# Patient Record
Sex: MALE | Race: BLACK OR AFRICAN AMERICAN | NOT HISPANIC OR LATINO | Employment: FULL TIME | URBAN - METROPOLITAN AREA
[De-identification: names, ages, dates, MRNs, and addresses within clinical notes are randomized per-mention and may not be internally consistent; named-entity substitution may affect disease eponyms.]

---

## 2017-01-01 ENCOUNTER — HOSPITAL ENCOUNTER (INPATIENT)
Facility: HOSPITAL | Age: 52
LOS: 1 days | Discharge: HOME | DRG: 194 | End: 2017-03-08
Attending: EMERGENCY MEDICINE | Admitting: HOSPITALIST

## 2017-01-01 ENCOUNTER — PATIENT OUTREACH (OUTPATIENT)
Dept: ADMINISTRATIVE | Facility: CLINIC | Age: 52
End: 2017-01-01

## 2017-01-01 ENCOUNTER — SURGERY (OUTPATIENT)
Age: 52
End: 2017-01-01

## 2017-01-01 ENCOUNTER — ANESTHESIA EVENT (OUTPATIENT)
Dept: CARDIOLOGY | Facility: HOSPITAL | Age: 52
DRG: 270 | End: 2017-01-01
Payer: MEDICAID

## 2017-01-01 ENCOUNTER — ANESTHESIA (OUTPATIENT)
Dept: CARDIOLOGY | Facility: HOSPITAL | Age: 52
DRG: 270 | End: 2017-01-01
Payer: MEDICAID

## 2017-01-01 ENCOUNTER — HOSPITAL ENCOUNTER (INPATIENT)
Facility: HOSPITAL | Age: 52
LOS: 6 days | DRG: 270 | End: 2017-12-20
Attending: EMERGENCY MEDICINE | Admitting: INTERNAL MEDICINE
Payer: MEDICAID

## 2017-01-01 VITALS
OXYGEN SATURATION: 98 % | TEMPERATURE: 99 F | DIASTOLIC BLOOD PRESSURE: 85 MMHG | HEIGHT: 75 IN | BODY MASS INDEX: 31.29 KG/M2 | RESPIRATION RATE: 22 BRPM | WEIGHT: 251.69 LBS | HEART RATE: 74 BPM | SYSTOLIC BLOOD PRESSURE: 145 MMHG

## 2017-01-01 DIAGNOSIS — E87.8 ELECTROLYTE ABNORMALITY: ICD-10-CM

## 2017-01-01 DIAGNOSIS — I51.7 ENLARGED LA (LEFT ATRIUM): ICD-10-CM

## 2017-01-01 DIAGNOSIS — N17.1 ACUTE RENAL FAILURE WITH ACUTE CORTICAL NECROSIS: ICD-10-CM

## 2017-01-01 DIAGNOSIS — I63.9 CEREBROVASCULAR ACCIDENT (CVA), UNSPECIFIED MECHANISM: ICD-10-CM

## 2017-01-01 DIAGNOSIS — E11.9 CONTROLLED TYPE 2 DIABETES MELLITUS WITHOUT COMPLICATION, WITHOUT LONG-TERM CURRENT USE OF INSULIN: Chronic | ICD-10-CM

## 2017-01-01 DIAGNOSIS — I50.42 CHRONIC COMBINED SYSTOLIC AND DIASTOLIC HEART FAILURE: Chronic | ICD-10-CM

## 2017-01-01 DIAGNOSIS — Z86.718 HISTORY OF DVT (DEEP VEIN THROMBOSIS): Chronic | ICD-10-CM

## 2017-01-01 DIAGNOSIS — Z86.711 HISTORY OF PULMONARY EMBOLISM: Chronic | ICD-10-CM

## 2017-01-01 DIAGNOSIS — R57.0 CARDIOGENIC SHOCK: ICD-10-CM

## 2017-01-01 DIAGNOSIS — R79.89 ELEVATED TROPONIN: Chronic | ICD-10-CM

## 2017-01-01 DIAGNOSIS — R79.89 ELEVATED LFTS: ICD-10-CM

## 2017-01-01 DIAGNOSIS — I48.0 PAROXYSMAL ATRIAL FIBRILLATION: ICD-10-CM

## 2017-01-01 DIAGNOSIS — I48.92 ATRIAL FLUTTER: ICD-10-CM

## 2017-01-01 DIAGNOSIS — R00.0 TACHYCARDIA: Primary | ICD-10-CM

## 2017-01-01 DIAGNOSIS — G93.6 CYTOTOXIC CEREBRAL EDEMA: ICD-10-CM

## 2017-01-01 DIAGNOSIS — J18.9 PNEUMONIA OF RIGHT LOWER LOBE DUE TO INFECTIOUS ORGANISM: Primary | ICD-10-CM

## 2017-01-01 DIAGNOSIS — I50.43 ACUTE ON CHRONIC COMBINED SYSTOLIC AND DIASTOLIC HEART FAILURE: ICD-10-CM

## 2017-01-01 DIAGNOSIS — Z91.148 HISTORY OF MEDICATION NONCOMPLIANCE: Chronic | ICD-10-CM

## 2017-01-01 DIAGNOSIS — I10 ESSENTIAL HYPERTENSION: Chronic | ICD-10-CM

## 2017-01-01 DIAGNOSIS — I50.21 ACUTE SYSTOLIC HEART FAILURE: ICD-10-CM

## 2017-01-01 DIAGNOSIS — N17.8 ACUTE RENAL FAILURE WITH OTHER SPECIFIED PATHOLOGICAL LESION IN KIDNEY: ICD-10-CM

## 2017-01-01 DIAGNOSIS — R07.9 CHEST PAIN: ICD-10-CM

## 2017-01-01 DIAGNOSIS — I48.19 PERSISTENT ATRIAL FIBRILLATION: ICD-10-CM

## 2017-01-01 DIAGNOSIS — I63.511 ACUTE RIGHT MCA STROKE: ICD-10-CM

## 2017-01-01 DIAGNOSIS — F10.20 ALCOHOLISM: ICD-10-CM

## 2017-01-01 DIAGNOSIS — I63.411 EMBOLIC STROKE INVOLVING RIGHT MIDDLE CEREBRAL ARTERY: ICD-10-CM

## 2017-01-01 DIAGNOSIS — Z72.0 TOBACCO ABUSE: Chronic | ICD-10-CM

## 2017-01-01 DIAGNOSIS — I26.99 OTHER ACUTE PULMONARY EMBOLISM WITHOUT ACUTE COR PULMONALE: ICD-10-CM

## 2017-01-01 DIAGNOSIS — E86.1 HYPOVOLEMIA: ICD-10-CM

## 2017-01-01 DIAGNOSIS — R06.02 SHORTNESS OF BREATH: ICD-10-CM

## 2017-01-01 DIAGNOSIS — G81.94 LEFT HEMIPARESIS: ICD-10-CM

## 2017-01-01 LAB
ABO + RH BLD: NORMAL
ALBUMIN SERPL BCP-MCNC: 1.9 G/DL
ALBUMIN SERPL BCP-MCNC: 1.9 G/DL
ALBUMIN SERPL BCP-MCNC: 2 G/DL
ALBUMIN SERPL BCP-MCNC: 2.1 G/DL
ALBUMIN SERPL BCP-MCNC: 2.1 G/DL
ALBUMIN SERPL BCP-MCNC: 2.2 G/DL
ALBUMIN SERPL BCP-MCNC: 2.5 G/DL
ALBUMIN SERPL BCP-MCNC: 2.5 G/DL
ALBUMIN SERPL BCP-MCNC: 2.7 G/DL
ALBUMIN SERPL BCP-MCNC: 2.8 G/DL
ALBUMIN SERPL BCP-MCNC: 3.1 G/DL
ALBUMIN SERPL BCP-MCNC: 3.9 G/DL
ALLENS TEST: ABNORMAL
ALP SERPL-CCNC: 137 U/L
ALP SERPL-CCNC: 181 U/L
ALP SERPL-CCNC: 192 U/L
ALP SERPL-CCNC: 208 U/L
ALP SERPL-CCNC: 216 U/L
ALP SERPL-CCNC: 230 U/L
ALP SERPL-CCNC: 233 U/L
ALP SERPL-CCNC: 235 U/L
ALP SERPL-CCNC: 53 U/L
ALP SERPL-CCNC: 65 U/L
ALT SERPL W/O P-5'-P-CCNC: 1099 U/L
ALT SERPL W/O P-5'-P-CCNC: 1380 U/L
ALT SERPL W/O P-5'-P-CCNC: 15 U/L
ALT SERPL W/O P-5'-P-CCNC: 1544 U/L
ALT SERPL W/O P-5'-P-CCNC: 2188 U/L
ALT SERPL W/O P-5'-P-CCNC: 22 U/L
ALT SERPL W/O P-5'-P-CCNC: 3493 U/L
ALT SERPL W/O P-5'-P-CCNC: 4242 U/L
ALT SERPL W/O P-5'-P-CCNC: 5648 U/L
ALT SERPL W/O P-5'-P-CCNC: 98 U/L
ANION GAP SERPL CALC-SCNC: 10 MMOL/L
ANION GAP SERPL CALC-SCNC: 13 MMOL/L
ANION GAP SERPL CALC-SCNC: 13 MMOL/L
ANION GAP SERPL CALC-SCNC: 15 MMOL/L
ANION GAP SERPL CALC-SCNC: 16 MMOL/L
ANION GAP SERPL CALC-SCNC: 16 MMOL/L
ANION GAP SERPL CALC-SCNC: 17 MMOL/L
ANION GAP SERPL CALC-SCNC: 17 MMOL/L
ANION GAP SERPL CALC-SCNC: 21 MMOL/L
ANION GAP SERPL CALC-SCNC: 25 MMOL/L
ANION GAP SERPL CALC-SCNC: 4 MMOL/L
ANION GAP SERPL CALC-SCNC: 5 MMOL/L
ANION GAP SERPL CALC-SCNC: 5 MMOL/L
ANION GAP SERPL CALC-SCNC: 6 MMOL/L
ANION GAP SERPL CALC-SCNC: 7 MMOL/L
ANION GAP SERPL CALC-SCNC: 9 MMOL/L
ANISOCYTOSIS BLD QL SMEAR: SLIGHT
APTT BLDCRRT: 26.8 SEC
APTT BLDCRRT: 35.5 SEC
APTT BLDCRRT: 36.1 SEC
AST SERPL-CCNC: 1093 U/L
AST SERPL-CCNC: 1458 U/L
AST SERPL-CCNC: 16 U/L
AST SERPL-CCNC: 2945 U/L
AST SERPL-CCNC: 32 U/L
AST SERPL-CCNC: 689 U/L
AST SERPL-CCNC: 7771 U/L
AST SERPL-CCNC: 93 U/L
AST SERPL-CCNC: ABNORMAL U/L
AST SERPL-CCNC: ABNORMAL U/L
BACTERIA #/AREA URNS AUTO: ABNORMAL /HPF
BACTERIA BLD CULT: NORMAL
BACTERIA SPEC AEROBE CULT: NORMAL
BACTERIA UR CULT: NO GROWTH
BASOPHILS # BLD AUTO: 0.01 K/UL
BASOPHILS # BLD AUTO: 0.02 K/UL
BASOPHILS # BLD AUTO: 0.03 K/UL
BASOPHILS # BLD AUTO: 0.03 K/UL
BASOPHILS # BLD AUTO: 0.05 K/UL
BASOPHILS NFR BLD: 0.1 %
BASOPHILS NFR BLD: 0.2 %
BASOPHILS NFR BLD: 0.3 %
BILIRUB SERPL-MCNC: 0.6 MG/DL
BILIRUB SERPL-MCNC: 0.8 MG/DL
BILIRUB SERPL-MCNC: 0.9 MG/DL
BILIRUB SERPL-MCNC: 2.9 MG/DL
BILIRUB SERPL-MCNC: 4.1 MG/DL
BILIRUB SERPL-MCNC: 4.5 MG/DL
BILIRUB SERPL-MCNC: 5.7 MG/DL
BILIRUB SERPL-MCNC: 8.3 MG/DL
BILIRUB SERPL-MCNC: 8.6 MG/DL
BILIRUB SERPL-MCNC: 8.6 MG/DL
BILIRUB UR QL STRIP: NEGATIVE
BLD GP AB SCN CELLS X3 SERPL QL: NORMAL
BLD PROD TYP BPU: NORMAL
BLD PROD TYP BPU: NORMAL
BLOOD UNIT EXPIRATION DATE: NORMAL
BLOOD UNIT EXPIRATION DATE: NORMAL
BLOOD UNIT TYPE CODE: 6200
BLOOD UNIT TYPE CODE: 6200
BLOOD UNIT TYPE: NORMAL
BLOOD UNIT TYPE: NORMAL
BNP SERPL-MCNC: 187 PG/ML
BNP SERPL-MCNC: 671 PG/ML
BNP SERPL-MCNC: 679 PG/ML
BUN SERPL-MCNC: 10 MG/DL
BUN SERPL-MCNC: 13 MG/DL
BUN SERPL-MCNC: 13 MG/DL
BUN SERPL-MCNC: 15 MG/DL
BUN SERPL-MCNC: 21 MG/DL
BUN SERPL-MCNC: 25 MG/DL
BUN SERPL-MCNC: 25 MG/DL
BUN SERPL-MCNC: 26 MG/DL
BUN SERPL-MCNC: 31 MG/DL
BUN SERPL-MCNC: 36 MG/DL
BUN SERPL-MCNC: 46 MG/DL
BUN SERPL-MCNC: 47 MG/DL
BUN SERPL-MCNC: 50 MG/DL
BUN SERPL-MCNC: 7 MG/DL
BUN SERPL-MCNC: 7 MG/DL
BUN SERPL-MCNC: 8 MG/DL
BUN SERPL-MCNC: 9 MG/DL
BURR CELLS BLD QL SMEAR: ABNORMAL
CA-I BLDV-SCNC: 0.92 MMOL/L
CALCIUM SERPL-MCNC: 5.7 MG/DL
CALCIUM SERPL-MCNC: 5.7 MG/DL
CALCIUM SERPL-MCNC: 5.8 MG/DL
CALCIUM SERPL-MCNC: 5.8 MG/DL
CALCIUM SERPL-MCNC: 6.3 MG/DL
CALCIUM SERPL-MCNC: 6.4 MG/DL
CALCIUM SERPL-MCNC: 6.6 MG/DL
CALCIUM SERPL-MCNC: 6.7 MG/DL
CALCIUM SERPL-MCNC: 6.8 MG/DL
CALCIUM SERPL-MCNC: 6.8 MG/DL
CALCIUM SERPL-MCNC: 8.2 MG/DL
CALCIUM SERPL-MCNC: 8.2 MG/DL
CALCIUM SERPL-MCNC: 8.5 MG/DL
CALCIUM SERPL-MCNC: 8.6 MG/DL
CALCIUM SERPL-MCNC: 8.6 MG/DL
CALCIUM SERPL-MCNC: 8.7 MG/DL
CALCIUM SERPL-MCNC: 9 MG/DL
CALCIUM SERPL-MCNC: 9 MG/DL
CALCIUM SERPL-MCNC: 9.1 MG/DL
CALCIUM SERPL-MCNC: 9.2 MG/DL
CALCIUM SERPL-MCNC: 9.3 MG/DL
CHLORIDE SERPL-SCNC: 100 MMOL/L
CHLORIDE SERPL-SCNC: 101 MMOL/L
CHLORIDE SERPL-SCNC: 101 MMOL/L
CHLORIDE SERPL-SCNC: 102 MMOL/L
CHLORIDE SERPL-SCNC: 103 MMOL/L
CHLORIDE SERPL-SCNC: 104 MMOL/L
CHLORIDE SERPL-SCNC: 88 MMOL/L
CHLORIDE SERPL-SCNC: 89 MMOL/L
CHLORIDE SERPL-SCNC: 89 MMOL/L
CHLORIDE SERPL-SCNC: 90 MMOL/L
CHLORIDE SERPL-SCNC: 91 MMOL/L
CHLORIDE SERPL-SCNC: 91 MMOL/L
CHLORIDE SERPL-SCNC: 92 MMOL/L
CHLORIDE SERPL-SCNC: 93 MMOL/L
CHLORIDE SERPL-SCNC: 95 MMOL/L
CHLORIDE SERPL-SCNC: 95 MMOL/L
CHLORIDE SERPL-SCNC: 96 MMOL/L
CHLORIDE SERPL-SCNC: 97 MMOL/L
CLARITY UR REFRACT.AUTO: ABNORMAL
CO2 SERPL-SCNC: 13 MMOL/L
CO2 SERPL-SCNC: 17 MMOL/L
CO2 SERPL-SCNC: 18 MMOL/L
CO2 SERPL-SCNC: 19 MMOL/L
CO2 SERPL-SCNC: 19 MMOL/L
CO2 SERPL-SCNC: 22 MMOL/L
CO2 SERPL-SCNC: 22 MMOL/L
CO2 SERPL-SCNC: 23 MMOL/L
CO2 SERPL-SCNC: 24 MMOL/L
CO2 SERPL-SCNC: 25 MMOL/L
CO2 SERPL-SCNC: 26 MMOL/L
CO2 SERPL-SCNC: 27 MMOL/L
CO2 SERPL-SCNC: 28 MMOL/L
CO2 SERPL-SCNC: 28 MMOL/L
CO2 SERPL-SCNC: 29 MMOL/L
CODING SYSTEM: NORMAL
CODING SYSTEM: NORMAL
COLOR UR AUTO: ABNORMAL
CREAT SERPL-MCNC: 0.9 MG/DL
CREAT SERPL-MCNC: 1.2 MG/DL
CREAT SERPL-MCNC: 1.3 MG/DL
CREAT SERPL-MCNC: 1.4 MG/DL
CREAT SERPL-MCNC: 1.5 MG/DL
CREAT SERPL-MCNC: 1.7 MG/DL
CREAT SERPL-MCNC: 1.7 MG/DL
CREAT SERPL-MCNC: 2 MG/DL
CREAT SERPL-MCNC: 2.3 MG/DL
CREAT SERPL-MCNC: 2.8 MG/DL
CREAT SERPL-MCNC: 3.6 MG/DL
CREAT SERPL-MCNC: 3.6 MG/DL
CREAT SERPL-MCNC: 4.3 MG/DL
CREAT SERPL-MCNC: 4.5 MG/DL
CREAT SERPL-MCNC: 5.7 MG/DL
CREAT SERPL-MCNC: 5.7 MG/DL
CREAT SERPL-MCNC: 5.9 MG/DL
CREAT SERPL-MCNC: 5.9 MG/DL
CREAT SERPL-MCNC: 6 MG/DL
D DIMER PPP IA.FEU-MCNC: 2.7 MG/L FEU
DACRYOCYTES BLD QL SMEAR: ABNORMAL
DELSYS: ABNORMAL
DIASTOLIC DYSFUNCTION: YES
DIFFERENTIAL METHOD: ABNORMAL
DISPENSE STATUS: NORMAL
DISPENSE STATUS: NORMAL
EOSINOPHIL # BLD AUTO: 0 K/UL
EOSINOPHIL # BLD AUTO: 0.1 K/UL
EOSINOPHIL NFR BLD: 0 %
EOSINOPHIL NFR BLD: 0 %
EOSINOPHIL NFR BLD: 0.1 %
EOSINOPHIL NFR BLD: 0.2 %
EOSINOPHIL NFR BLD: 0.3 %
EOSINOPHIL NFR BLD: 0.3 %
EOSINOPHIL NFR BLD: 0.4 %
EOSINOPHIL NFR BLD: 0.5 %
EOSINOPHIL NFR BLD: 0.9 %
EOSINOPHIL NFR BLD: 1.7 %
ERYTHROCYTE [DISTWIDTH] IN BLOOD BY AUTOMATED COUNT: 13.6 %
ERYTHROCYTE [DISTWIDTH] IN BLOOD BY AUTOMATED COUNT: 13.8 %
ERYTHROCYTE [DISTWIDTH] IN BLOOD BY AUTOMATED COUNT: 13.9 %
ERYTHROCYTE [DISTWIDTH] IN BLOOD BY AUTOMATED COUNT: 14 %
ERYTHROCYTE [DISTWIDTH] IN BLOOD BY AUTOMATED COUNT: 14.1 %
ERYTHROCYTE [DISTWIDTH] IN BLOOD BY AUTOMATED COUNT: 14.3 %
ERYTHROCYTE [DISTWIDTH] IN BLOOD BY AUTOMATED COUNT: 14.4 %
EST. GFR  (AFRICAN AMERICAN): 11.6 ML/MIN/1.73 M^2
EST. GFR  (AFRICAN AMERICAN): 11.8 ML/MIN/1.73 M^2
EST. GFR  (AFRICAN AMERICAN): 11.8 ML/MIN/1.73 M^2
EST. GFR  (AFRICAN AMERICAN): 12.3 ML/MIN/1.73 M^2
EST. GFR  (AFRICAN AMERICAN): 12.3 ML/MIN/1.73 M^2
EST. GFR  (AFRICAN AMERICAN): 16 ML/MIN/1.73 M^2
EST. GFR  (AFRICAN AMERICAN): 17.3 ML/MIN/1.73 M^2
EST. GFR  (AFRICAN AMERICAN): 21.5 ML/MIN/1.73 M^2
EST. GFR  (AFRICAN AMERICAN): 21.5 ML/MIN/1.73 M^2
EST. GFR  (AFRICAN AMERICAN): 29 ML/MIN/1.73 M^2
EST. GFR  (AFRICAN AMERICAN): 36.9 ML/MIN/1.73 M^2
EST. GFR  (AFRICAN AMERICAN): 43.7 ML/MIN/1.73 M^2
EST. GFR  (AFRICAN AMERICAN): 53.2 ML/MIN/1.73 M^2
EST. GFR  (AFRICAN AMERICAN): 53.2 ML/MIN/1.73 M^2
EST. GFR  (AFRICAN AMERICAN): >60 ML/MIN/1.73 M^2
EST. GFR  (NON AFRICAN AMERICAN): 10 ML/MIN/1.73 M^2
EST. GFR  (NON AFRICAN AMERICAN): 10.2 ML/MIN/1.73 M^2
EST. GFR  (NON AFRICAN AMERICAN): 10.2 ML/MIN/1.73 M^2
EST. GFR  (NON AFRICAN AMERICAN): 10.7 ML/MIN/1.73 M^2
EST. GFR  (NON AFRICAN AMERICAN): 10.7 ML/MIN/1.73 M^2
EST. GFR  (NON AFRICAN AMERICAN): 14 ML/MIN/1.73 M^2
EST. GFR  (NON AFRICAN AMERICAN): 15 ML/MIN/1.73 M^2
EST. GFR  (NON AFRICAN AMERICAN): 18.6 ML/MIN/1.73 M^2
EST. GFR  (NON AFRICAN AMERICAN): 18.6 ML/MIN/1.73 M^2
EST. GFR  (NON AFRICAN AMERICAN): 25 ML/MIN/1.73 M^2
EST. GFR  (NON AFRICAN AMERICAN): 31.9 ML/MIN/1.73 M^2
EST. GFR  (NON AFRICAN AMERICAN): 37.8 ML/MIN/1.73 M^2
EST. GFR  (NON AFRICAN AMERICAN): 46 ML/MIN/1.73 M^2
EST. GFR  (NON AFRICAN AMERICAN): 46 ML/MIN/1.73 M^2
EST. GFR  (NON AFRICAN AMERICAN): 53.5 ML/MIN/1.73 M^2
EST. GFR  (NON AFRICAN AMERICAN): 58 ML/MIN/1.73 M^2
EST. GFR  (NON AFRICAN AMERICAN): 58.2 ML/MIN/1.73 M^2
EST. GFR  (NON AFRICAN AMERICAN): 58.2 ML/MIN/1.73 M^2
EST. GFR  (NON AFRICAN AMERICAN): >60 ML/MIN/1.73 M^2
ESTIMATED AVG GLUCOSE: 140 MG/DL
ESTIMATED AVG GLUCOSE: 157 MG/DL
ESTIMATED PA SYSTOLIC PRESSURE: 37.09
FACT X PPP CHRO-ACNC: 0.1 IU/ML
FACT X PPP CHRO-ACNC: 0.38 IU/ML
FACT X PPP CHRO-ACNC: 0.52 IU/ML
FLOW: 4
FLUAV AG SPEC QL IA: NEGATIVE
FLUBV AG SPEC QL IA: NEGATIVE
GLOBAL PERICARDIAL EFFUSION: ABNORMAL
GLUCOSE SERPL-MCNC: 105 MG/DL
GLUCOSE SERPL-MCNC: 107 MG/DL
GLUCOSE SERPL-MCNC: 108 MG/DL
GLUCOSE SERPL-MCNC: 120 MG/DL
GLUCOSE SERPL-MCNC: 122 MG/DL
GLUCOSE SERPL-MCNC: 140 MG/DL
GLUCOSE SERPL-MCNC: 150 MG/DL
GLUCOSE SERPL-MCNC: 158 MG/DL
GLUCOSE SERPL-MCNC: 170 MG/DL
GLUCOSE SERPL-MCNC: 173 MG/DL
GLUCOSE SERPL-MCNC: 174 MG/DL
GLUCOSE SERPL-MCNC: 67 MG/DL
GLUCOSE SERPL-MCNC: 74 MG/DL
GLUCOSE SERPL-MCNC: 76 MG/DL
GLUCOSE SERPL-MCNC: 76 MG/DL
GLUCOSE SERPL-MCNC: 83 MG/DL
GLUCOSE SERPL-MCNC: 83 MG/DL
GLUCOSE SERPL-MCNC: 89 MG/DL
GLUCOSE SERPL-MCNC: 90 MG/DL
GLUCOSE SERPL-MCNC: 90 MG/DL
GLUCOSE SERPL-MCNC: 93 MG/DL
GLUCOSE UR QL STRIP: ABNORMAL
GRAM STN SPEC: NORMAL
HBA1C MFR BLD HPLC: 6.5 %
HBA1C MFR BLD HPLC: 7.1 %
HCO3 UR-SCNC: 14.4 MMOL/L (ref 24–28)
HCO3 UR-SCNC: 20.3 MMOL/L (ref 24–28)
HCO3 UR-SCNC: 20.5 MMOL/L (ref 24–28)
HCO3 UR-SCNC: 20.9 MMOL/L (ref 24–28)
HCO3 UR-SCNC: 20.9 MMOL/L (ref 24–28)
HCO3 UR-SCNC: 22.5 MMOL/L (ref 24–28)
HCO3 UR-SCNC: 22.6 MMOL/L (ref 24–28)
HCO3 UR-SCNC: 22.7 MMOL/L (ref 24–28)
HCO3 UR-SCNC: 24.2 MMOL/L (ref 24–28)
HCO3 UR-SCNC: 26.6 MMOL/L (ref 24–28)
HCO3 UR-SCNC: 26.9 MMOL/L (ref 24–28)
HCO3 UR-SCNC: 27.7 MMOL/L (ref 24–28)
HCO3 UR-SCNC: 28.1 MMOL/L (ref 24–28)
HCO3 UR-SCNC: 28.6 MMOL/L (ref 24–28)
HCO3 UR-SCNC: 29 MMOL/L (ref 24–28)
HCT VFR BLD AUTO: 36.1 %
HCT VFR BLD AUTO: 37.5 %
HCT VFR BLD AUTO: 38.3 %
HCT VFR BLD AUTO: 38.8 %
HCT VFR BLD AUTO: 40.4 %
HCT VFR BLD AUTO: 41.7 %
HCT VFR BLD AUTO: 42.5 %
HCT VFR BLD AUTO: 44.9 %
HCT VFR BLD AUTO: 45.5 %
HCT VFR BLD AUTO: 46.8 %
HCT VFR BLD AUTO: 48.6 %
HCT VFR BLD AUTO: 50.3 %
HCT VFR BLD CALC: 33 %PCV (ref 36–54)
HCT VFR BLD CALC: 39 %PCV (ref 36–54)
HCT VFR BLD CALC: 39 %PCV (ref 36–54)
HCT VFR BLD CALC: 40 %PCV (ref 36–54)
HEPARIN INDUCED THROMBOCYTOPENIA: NORMAL
HGB BLD-MCNC: 12.4 G/DL
HGB BLD-MCNC: 13 G/DL
HGB BLD-MCNC: 13.1 G/DL
HGB BLD-MCNC: 13.6 G/DL
HGB BLD-MCNC: 13.6 G/DL
HGB BLD-MCNC: 14.1 G/DL
HGB BLD-MCNC: 14.7 G/DL
HGB BLD-MCNC: 14.8 G/DL
HGB BLD-MCNC: 15.1 G/DL
HGB BLD-MCNC: 15.8 G/DL
HGB BLD-MCNC: 16.4 G/DL
HGB BLD-MCNC: 17.6 G/DL
HGB UR QL STRIP: ABNORMAL
HYALINE CASTS UR QL AUTO: 7 /LPF
HYPOCHROMIA BLD QL SMEAR: ABNORMAL
IMM GRANULOCYTES # BLD AUTO: 0.04 K/UL
IMM GRANULOCYTES # BLD AUTO: 0.04 K/UL
IMM GRANULOCYTES # BLD AUTO: 0.05 K/UL
IMM GRANULOCYTES # BLD AUTO: 0.07 K/UL
IMM GRANULOCYTES # BLD AUTO: 0.09 K/UL
IMM GRANULOCYTES # BLD AUTO: 0.1 K/UL
IMM GRANULOCYTES # BLD AUTO: 0.14 K/UL
IMM GRANULOCYTES NFR BLD AUTO: 0.4 %
IMM GRANULOCYTES NFR BLD AUTO: 0.5 %
IMM GRANULOCYTES NFR BLD AUTO: 0.6 %
IMM GRANULOCYTES NFR BLD AUTO: 0.6 %
IMM GRANULOCYTES NFR BLD AUTO: 0.9 %
IMM GRANULOCYTES NFR BLD AUTO: 0.9 %
IMM GRANULOCYTES NFR BLD AUTO: 1 %
INR PPP: 1
INR PPP: 1.9
INR PPP: 2
INR PPP: 2.6
INR PPP: 3.6
INR PPP: 5.6
KETONES UR QL STRIP: NEGATIVE
LACTATE SERPL-SCNC: 0.7 MMOL/L
LACTATE SERPL-SCNC: 2.2 MMOL/L
LACTATE SERPL-SCNC: 4.3 MMOL/L
LACTATE SERPL-SCNC: 6 MMOL/L
LEUKOCYTE ESTERASE UR QL STRIP: ABNORMAL
LYMPHOCYTES # BLD AUTO: 0.4 K/UL
LYMPHOCYTES # BLD AUTO: 0.6 K/UL
LYMPHOCYTES # BLD AUTO: 0.6 K/UL
LYMPHOCYTES # BLD AUTO: 0.7 K/UL
LYMPHOCYTES # BLD AUTO: 1 K/UL
LYMPHOCYTES # BLD AUTO: 1 K/UL
LYMPHOCYTES # BLD AUTO: 1.1 K/UL
LYMPHOCYTES # BLD AUTO: 1.5 K/UL
LYMPHOCYTES # BLD AUTO: 1.6 K/UL
LYMPHOCYTES # BLD AUTO: 1.6 K/UL
LYMPHOCYTES # BLD AUTO: 1.7 K/UL
LYMPHOCYTES # BLD AUTO: 2.9 K/UL
LYMPHOCYTES NFR BLD: 10.8 %
LYMPHOCYTES NFR BLD: 13.5 %
LYMPHOCYTES NFR BLD: 18.9 %
LYMPHOCYTES NFR BLD: 23.8 %
LYMPHOCYTES NFR BLD: 4.6 %
LYMPHOCYTES NFR BLD: 6.4 %
LYMPHOCYTES NFR BLD: 6.4 %
LYMPHOCYTES NFR BLD: 7.7 %
LYMPHOCYTES NFR BLD: 8.5 %
LYMPHOCYTES NFR BLD: 8.8 %
LYMPHOCYTES NFR BLD: 9 %
LYMPHOCYTES NFR BLD: 9.2 %
MAGNESIUM SERPL-MCNC: 1.6 MG/DL
MAGNESIUM SERPL-MCNC: 1.7 MG/DL
MAGNESIUM SERPL-MCNC: 1.8 MG/DL
MAGNESIUM SERPL-MCNC: 1.9 MG/DL
MAGNESIUM SERPL-MCNC: 2 MG/DL
MAGNESIUM SERPL-MCNC: 2.1 MG/DL
MAGNESIUM SERPL-MCNC: 2.2 MG/DL
MCH RBC QN AUTO: 29.2 PG
MCH RBC QN AUTO: 29.4 PG
MCH RBC QN AUTO: 29.4 PG
MCH RBC QN AUTO: 29.5 PG
MCH RBC QN AUTO: 29.7 PG
MCH RBC QN AUTO: 29.8 PG
MCH RBC QN AUTO: 30 PG
MCH RBC QN AUTO: 30.3 PG
MCH RBC QN AUTO: 30.4 PG
MCH RBC QN AUTO: 30.6 PG
MCH RBC QN AUTO: 32.1 PG
MCH RBC QN AUTO: 32.2 PG
MCHC RBC AUTO-ENTMCNC: 32.6 G/DL
MCHC RBC AUTO-ENTMCNC: 32.7 G/DL
MCHC RBC AUTO-ENTMCNC: 33.2 G/DL
MCHC RBC AUTO-ENTMCNC: 33.2 G/DL
MCHC RBC AUTO-ENTMCNC: 33.7 G/DL
MCHC RBC AUTO-ENTMCNC: 33.8 G/DL
MCHC RBC AUTO-ENTMCNC: 34.2 G/DL
MCHC RBC AUTO-ENTMCNC: 34.3 G/DL
MCHC RBC AUTO-ENTMCNC: 34.7 G/DL
MCHC RBC AUTO-ENTMCNC: 35.1 G/DL
MCHC RBC AUTO-ENTMCNC: 35.5 %
MCHC RBC AUTO-ENTMCNC: 36.2 %
MCV RBC AUTO: 85 FL
MCV RBC AUTO: 87 FL
MCV RBC AUTO: 87 FL
MCV RBC AUTO: 89 FL
MCV RBC AUTO: 89 FL
MCV RBC AUTO: 90 FL
MCV RBC AUTO: 91 FL
MCV RBC AUTO: 91 FL
MCV RBC AUTO: 93 FL
MCV RBC AUTO: 93 FL
METHEMOGLOBIN: 0.7 % (ref 0–3)
METHEMOGLOBIN: 0.8 % (ref 0–3)
MICROSCOPIC COMMENT: ABNORMAL
MITRAL VALVE REGURGITATION: ABNORMAL
MODE: ABNORMAL
MONOCYTES # BLD AUTO: 0.5 K/UL
MONOCYTES # BLD AUTO: 0.6 K/UL
MONOCYTES # BLD AUTO: 0.6 K/UL
MONOCYTES # BLD AUTO: 0.7 K/UL
MONOCYTES # BLD AUTO: 0.8 K/UL
MONOCYTES # BLD AUTO: 0.9 K/UL
MONOCYTES # BLD AUTO: 0.9 K/UL
MONOCYTES # BLD AUTO: 1.1 K/UL
MONOCYTES # BLD AUTO: 1.2 K/UL
MONOCYTES # BLD AUTO: 1.4 K/UL
MONOCYTES # BLD AUTO: 1.5 K/UL
MONOCYTES # BLD AUTO: 1.7 K/UL
MONOCYTES NFR BLD: 10 %
MONOCYTES NFR BLD: 11.9 %
MONOCYTES NFR BLD: 12.3 %
MONOCYTES NFR BLD: 13.1 %
MONOCYTES NFR BLD: 5.5 %
MONOCYTES NFR BLD: 5.7 %
MONOCYTES NFR BLD: 6.1 %
MONOCYTES NFR BLD: 6.6 %
MONOCYTES NFR BLD: 7.4 %
MONOCYTES NFR BLD: 7.6 %
MONOCYTES NFR BLD: 8.8 %
MONOCYTES NFR BLD: 9.6 %
NEUTROPHILS # BLD AUTO: 11.1 K/UL
NEUTROPHILS # BLD AUTO: 11.2 K/UL
NEUTROPHILS # BLD AUTO: 11.8 K/UL
NEUTROPHILS # BLD AUTO: 13.6 K/UL
NEUTROPHILS # BLD AUTO: 4.5 K/UL
NEUTROPHILS # BLD AUTO: 6.1 K/UL
NEUTROPHILS # BLD AUTO: 7.3 K/UL
NEUTROPHILS # BLD AUTO: 8.3 K/UL
NEUTROPHILS # BLD AUTO: 8.4 K/UL
NEUTROPHILS # BLD AUTO: 8.4 K/UL
NEUTROPHILS # BLD AUTO: 8.7 K/UL
NEUTROPHILS # BLD AUTO: 9.3 K/UL
NEUTROPHILS NFR BLD: 65 %
NEUTROPHILS NFR BLD: 73.3 %
NEUTROPHILS NFR BLD: 73.5 %
NEUTROPHILS NFR BLD: 76.7 %
NEUTROPHILS NFR BLD: 80.4 %
NEUTROPHILS NFR BLD: 80.7 %
NEUTROPHILS NFR BLD: 81.4 %
NEUTROPHILS NFR BLD: 83.1 %
NEUTROPHILS NFR BLD: 83.5 %
NEUTROPHILS NFR BLD: 84.6 %
NEUTROPHILS NFR BLD: 84.9 %
NEUTROPHILS NFR BLD: 87.3 %
NITRITE UR QL STRIP: NEGATIVE
NRBC BLD-RTO: 0 /100 WBC
NUM UNITS TRANS FFP: NORMAL
NUM UNITS TRANS FFP: NORMAL
OVALOCYTES BLD QL SMEAR: ABNORMAL
OVALOCYTES BLD QL SMEAR: ABNORMAL
PCO2 BLDA: 27.3 MMHG (ref 35–45)
PCO2 BLDA: 36.2 MMHG (ref 35–45)
PCO2 BLDA: 38 MMHG (ref 35–45)
PCO2 BLDA: 40.6 MMHG (ref 35–45)
PCO2 BLDA: 43.2 MMHG (ref 35–45)
PCO2 BLDA: 44 MMHG (ref 35–45)
PCO2 BLDA: 44.5 MMHG (ref 35–45)
PCO2 BLDA: 44.8 MMHG (ref 35–45)
PCO2 BLDA: 44.9 MMHG (ref 35–45)
PCO2 BLDA: 45.2 MMHG (ref 35–45)
PCO2 BLDA: 46.7 MMHG (ref 35–45)
PCO2 BLDA: 46.7 MMHG (ref 35–45)
PCO2 BLDA: 46.8 MMHG (ref 35–45)
PCO2 BLDA: 48.3 MMHG (ref 35–45)
PCO2 BLDA: 52.6 MMHG (ref 35–45)
PH SMN: 7.24 [PH] (ref 7.35–7.45)
PH SMN: 7.26 [PH] (ref 7.35–7.45)
PH SMN: 7.28 [PH] (ref 7.35–7.45)
PH SMN: 7.29 [PH] (ref 7.35–7.45)
PH SMN: 7.31 [PH] (ref 7.35–7.45)
PH SMN: 7.32 [PH] (ref 7.35–7.45)
PH SMN: 7.32 [PH] (ref 7.35–7.45)
PH SMN: 7.33 [PH] (ref 7.35–7.45)
PH SMN: 7.36 [PH] (ref 7.35–7.45)
PH SMN: 7.39 [PH] (ref 7.35–7.45)
PH SMN: 7.4 [PH] (ref 7.35–7.45)
PH SMN: 7.42 [PH] (ref 7.35–7.45)
PH SMN: 7.46 [PH] (ref 7.35–7.45)
PH UR STRIP: 5 [PH] (ref 5–8)
PHOSPHATE SERPL-MCNC: 2.4 MG/DL
PHOSPHATE SERPL-MCNC: 2.5 MG/DL
PHOSPHATE SERPL-MCNC: 2.6 MG/DL
PHOSPHATE SERPL-MCNC: 2.9 MG/DL
PHOSPHATE SERPL-MCNC: 3 MG/DL
PHOSPHATE SERPL-MCNC: 3.1 MG/DL
PHOSPHATE SERPL-MCNC: 3.3 MG/DL
PHOSPHATE SERPL-MCNC: 3.5 MG/DL
PHOSPHATE SERPL-MCNC: 3.7 MG/DL
PHOSPHATE SERPL-MCNC: 4.8 MG/DL
PHOSPHATE SERPL-MCNC: 6.8 MG/DL
PHOSPHATE SERPL-MCNC: 6.9 MG/DL
PHOSPHATE SERPL-MCNC: 7.2 MG/DL
PHOSPHATE SERPL-MCNC: 8.9 MG/DL
PLATELET # BLD AUTO: 136 K/UL
PLATELET # BLD AUTO: 159 K/UL
PLATELET # BLD AUTO: 172 K/UL
PLATELET # BLD AUTO: 189 K/UL
PLATELET # BLD AUTO: 204 K/UL
PLATELET # BLD AUTO: 229 K/UL
PLATELET # BLD AUTO: 291 K/UL
PLATELET # BLD AUTO: 63 K/UL
PLATELET # BLD AUTO: 68 K/UL
PLATELET # BLD AUTO: 78 K/UL
PLATELET # BLD AUTO: 91 K/UL
PLATELET # BLD AUTO: 93 K/UL
PLATELET BLD QL SMEAR: ABNORMAL
PMV BLD AUTO: 10 FL
PMV BLD AUTO: 10.4 FL
PMV BLD AUTO: 10.5 FL
PMV BLD AUTO: 10.6 FL
PMV BLD AUTO: 10.6 FL
PMV BLD AUTO: 10.7 FL
PMV BLD AUTO: 10.8 FL
PMV BLD AUTO: 11 FL
PMV BLD AUTO: 11.1 FL
PMV BLD AUTO: 11.5 FL
PO2 BLDA: 142 MMHG (ref 80–100)
PO2 BLDA: 35 MMHG (ref 40–60)
PO2 BLDA: 38 MMHG (ref 40–60)
PO2 BLDA: 39 MMHG (ref 40–60)
PO2 BLDA: 40 MMHG (ref 40–60)
PO2 BLDA: 41 MMHG (ref 40–60)
PO2 BLDA: 48 MMHG (ref 40–60)
PO2 BLDA: 48 MMHG (ref 40–60)
PO2 BLDA: 48 MMHG (ref 80–100)
PO2 BLDA: 50 MMHG (ref 40–60)
PO2 BLDA: 73 MMHG (ref 80–100)
PO2 BLDA: 74 MMHG (ref 80–100)
PO2 BLDA: 80 MMHG (ref 80–100)
PO2 BLDA: 83 MMHG (ref 80–100)
PO2 BLDA: 89 MMHG (ref 80–100)
POC BE: -10 MMOL/L
POC BE: -2 MMOL/L
POC BE: -2 MMOL/L
POC BE: -3 MMOL/L
POC BE: -5 MMOL/L
POC BE: -6 MMOL/L
POC BE: 1 MMOL/L
POC BE: 3 MMOL/L
POC BE: 4 MMOL/L
POC BE: 4 MMOL/L
POC IONIZED CALCIUM: 0.69 MMOL/L (ref 1.06–1.42)
POC IONIZED CALCIUM: 0.77 MMOL/L (ref 1.06–1.42)
POC IONIZED CALCIUM: 0.84 MMOL/L (ref 1.06–1.42)
POC IONIZED CALCIUM: 0.89 MMOL/L (ref 1.06–1.42)
POC IONIZED CALCIUM: 0.92 MMOL/L (ref 1.06–1.42)
POC SATURATED O2: 66 % (ref 95–100)
POC SATURATED O2: 71 % (ref 95–100)
POC SATURATED O2: 75 % (ref 95–100)
POC SATURATED O2: 75 % (ref 95–100)
POC SATURATED O2: 76 % (ref 95–100)
POC SATURATED O2: 76 % (ref 95–100)
POC SATURATED O2: 80 % (ref 95–100)
POC SATURATED O2: 80 % (ref 95–100)
POC SATURATED O2: 83 % (ref 95–100)
POC SATURATED O2: 93 % (ref 95–100)
POC SATURATED O2: 93 % (ref 95–100)
POC SATURATED O2: 94 % (ref 95–100)
POC SATURATED O2: 95 % (ref 95–100)
POC SATURATED O2: 97 % (ref 95–100)
POC SATURATED O2: 99 % (ref 95–100)
POC TCO2: 15 MMOL/L (ref 23–27)
POC TCO2: 21 MMOL/L (ref 23–27)
POC TCO2: 22 MMOL/L (ref 23–27)
POC TCO2: 22 MMOL/L (ref 23–27)
POC TCO2: 22 MMOL/L (ref 24–29)
POC TCO2: 24 MMOL/L (ref 23–27)
POC TCO2: 24 MMOL/L (ref 23–27)
POC TCO2: 24 MMOL/L (ref 24–29)
POC TCO2: 26 MMOL/L (ref 24–29)
POC TCO2: 28 MMOL/L (ref 24–29)
POC TCO2: 28 MMOL/L (ref 24–29)
POC TCO2: 29 MMOL/L (ref 23–27)
POC TCO2: 29 MMOL/L (ref 24–29)
POC TCO2: 30 MMOL/L (ref 24–29)
POC TCO2: 30 MMOL/L (ref 24–29)
POCT GLUCOSE: 100 MG/DL (ref 70–110)
POCT GLUCOSE: 101 MG/DL (ref 70–110)
POCT GLUCOSE: 115 MG/DL (ref 70–110)
POCT GLUCOSE: 115 MG/DL (ref 70–110)
POCT GLUCOSE: 117 MG/DL (ref 70–110)
POCT GLUCOSE: 119 MG/DL (ref 70–110)
POCT GLUCOSE: 122 MG/DL (ref 70–110)
POCT GLUCOSE: 125 MG/DL (ref 70–110)
POCT GLUCOSE: 126 MG/DL (ref 70–110)
POCT GLUCOSE: 127 MG/DL (ref 70–110)
POCT GLUCOSE: 132 MG/DL (ref 70–110)
POCT GLUCOSE: 133 MG/DL (ref 70–110)
POCT GLUCOSE: 136 MG/DL (ref 70–110)
POCT GLUCOSE: 138 MG/DL (ref 70–110)
POCT GLUCOSE: 139 MG/DL (ref 70–110)
POCT GLUCOSE: 139 MG/DL (ref 70–110)
POCT GLUCOSE: 156 MG/DL (ref 70–110)
POCT GLUCOSE: 178 MG/DL (ref 70–110)
POCT GLUCOSE: 220 MG/DL (ref 70–110)
POCT GLUCOSE: 45 MG/DL (ref 70–110)
POCT GLUCOSE: 67 MG/DL (ref 70–110)
POCT GLUCOSE: 68 MG/DL (ref 70–110)
POCT GLUCOSE: 69 MG/DL (ref 70–110)
POCT GLUCOSE: 80 MG/DL (ref 70–110)
POCT GLUCOSE: 84 MG/DL (ref 70–110)
POCT GLUCOSE: 91 MG/DL (ref 70–110)
POCT GLUCOSE: 92 MG/DL (ref 70–110)
POCT GLUCOSE: 98 MG/DL (ref 70–110)
POIKILOCYTOSIS BLD QL SMEAR: SLIGHT
POLYCHROMASIA BLD QL SMEAR: ABNORMAL
POTASSIUM BLD-SCNC: 3.6 MMOL/L (ref 3.5–5.1)
POTASSIUM BLD-SCNC: 4.1 MMOL/L (ref 3.5–5.1)
POTASSIUM BLD-SCNC: 4.3 MMOL/L (ref 3.5–5.1)
POTASSIUM BLD-SCNC: 4.3 MMOL/L (ref 3.5–5.1)
POTASSIUM BLD-SCNC: 4.4 MMOL/L (ref 3.5–5.1)
POTASSIUM SERPL-SCNC: 3.5 MMOL/L
POTASSIUM SERPL-SCNC: 3.7 MMOL/L
POTASSIUM SERPL-SCNC: 3.8 MMOL/L
POTASSIUM SERPL-SCNC: 4.1 MMOL/L
POTASSIUM SERPL-SCNC: 4.1 MMOL/L
POTASSIUM SERPL-SCNC: 4.2 MMOL/L
POTASSIUM SERPL-SCNC: 4.4 MMOL/L
POTASSIUM SERPL-SCNC: 4.5 MMOL/L
POTASSIUM SERPL-SCNC: 4.6 MMOL/L
POTASSIUM SERPL-SCNC: 4.7 MMOL/L
POTASSIUM SERPL-SCNC: 4.8 MMOL/L
POTASSIUM SERPL-SCNC: 4.8 MMOL/L
POTASSIUM SERPL-SCNC: 4.9 MMOL/L
POTASSIUM SERPL-SCNC: 4.9 MMOL/L
POTASSIUM SERPL-SCNC: 5 MMOL/L
POTASSIUM SERPL-SCNC: 5.6 MMOL/L
PROT SERPL-MCNC: 4.6 G/DL
PROT SERPL-MCNC: 4.7 G/DL
PROT SERPL-MCNC: 4.9 G/DL
PROT SERPL-MCNC: 4.9 G/DL
PROT SERPL-MCNC: 5.7 G/DL
PROT SERPL-MCNC: 5.8 G/DL
PROT SERPL-MCNC: 6.4 G/DL
PROT SERPL-MCNC: 6.5 G/DL
PROT SERPL-MCNC: 7.1 G/DL
PROT SERPL-MCNC: 8.2 G/DL
PROT UR QL STRIP: ABNORMAL
PROTHROMBIN TIME: 10.8 SEC
PROTHROMBIN TIME: 19.1 SEC
PROTHROMBIN TIME: 20.6 SEC
PROTHROMBIN TIME: 26.5 SEC
PROTHROMBIN TIME: 35.9 SEC
PROTHROMBIN TIME: 58.1 SEC
RBC # BLD AUTO: 4.13 M/UL
RBC # BLD AUTO: 4.4 M/UL
RBC # BLD AUTO: 4.49 M/UL
RBC # BLD AUTO: 4.56 M/UL
RBC # BLD AUTO: 4.6 M/UL
RBC # BLD AUTO: 4.62 M/UL
RBC # BLD AUTO: 4.8 M/UL
RBC # BLD AUTO: 4.83 M/UL
RBC # BLD AUTO: 5.08 M/UL
RBC # BLD AUTO: 5.17 M/UL
RBC # BLD AUTO: 5.41 M/UL
RBC # BLD AUTO: 5.49 M/UL
RBC #/AREA URNS AUTO: >100 /HPF (ref 0–4)
RETIRED EF AND QEF - SEE NOTES: 10 (ref 55–65)
SAMPLE: ABNORMAL
SITE: ABNORMAL
SODIUM BLD-SCNC: 123 MMOL/L (ref 136–145)
SODIUM BLD-SCNC: 126 MMOL/L (ref 136–145)
SODIUM BLD-SCNC: 127 MMOL/L (ref 136–145)
SODIUM BLD-SCNC: 129 MMOL/L (ref 136–145)
SODIUM BLD-SCNC: 132 MMOL/L (ref 136–145)
SODIUM SERPL-SCNC: 123 MMOL/L
SODIUM SERPL-SCNC: 123 MMOL/L
SODIUM SERPL-SCNC: 124 MMOL/L
SODIUM SERPL-SCNC: 124 MMOL/L
SODIUM SERPL-SCNC: 125 MMOL/L
SODIUM SERPL-SCNC: 126 MMOL/L
SODIUM SERPL-SCNC: 126 MMOL/L
SODIUM SERPL-SCNC: 127 MMOL/L
SODIUM SERPL-SCNC: 127 MMOL/L
SODIUM SERPL-SCNC: 129 MMOL/L
SODIUM SERPL-SCNC: 133 MMOL/L
SODIUM SERPL-SCNC: 134 MMOL/L
SODIUM SERPL-SCNC: 135 MMOL/L
SODIUM SERPL-SCNC: 136 MMOL/L
SODIUM SERPL-SCNC: 136 MMOL/L
SODIUM SERPL-SCNC: 137 MMOL/L
SODIUM SERPL-SCNC: 137 MMOL/L
SODIUM SERPL-SCNC: 139 MMOL/L
SODIUM SERPL-SCNC: 140 MMOL/L
SP GR UR STRIP: 1.02 (ref 1–1.03)
SP02: 88
SP02: 92
SP02: 94
SP02: 95
SP02: 99
SPECIMEN SOURCE: NORMAL
T4 FREE SERPL-MCNC: 1.1 NG/DL
TRICUSPID VALVE REGURGITATION: ABNORMAL
TROPONIN I SERPL DL<=0.01 NG/ML-MCNC: 0.15 NG/ML
TROPONIN I SERPL DL<=0.01 NG/ML-MCNC: 0.56 NG/ML
TROPONIN I SERPL DL<=0.01 NG/ML-MCNC: 0.63 NG/ML
TROPONIN I SERPL DL<=0.01 NG/ML-MCNC: 0.63 NG/ML
TROPONIN I SERPL DL<=0.01 NG/ML-MCNC: 0.67 NG/ML
TSH SERPL DL<=0.005 MIU/L-ACNC: 7.97 UIU/ML
URN SPEC COLLECT METH UR: ABNORMAL
UROBILINOGEN UR STRIP-ACNC: NEGATIVE EU/DL
WBC # BLD AUTO: 10.42 K/UL
WBC # BLD AUTO: 11.17 K/UL
WBC # BLD AUTO: 11.34 K/UL
WBC # BLD AUTO: 11.47 K/UL
WBC # BLD AUTO: 13.66 K/UL
WBC # BLD AUTO: 14.01 K/UL
WBC # BLD AUTO: 15.22 K/UL
WBC # BLD AUTO: 16.86 K/UL
WBC # BLD AUTO: 6.94 K/UL
WBC # BLD AUTO: 7.4 K/UL
WBC # BLD AUTO: 8.64 K/UL
WBC # BLD AUTO: 9.51 K/UL
WBC #/AREA URNS AUTO: 20 /HPF (ref 0–5)

## 2017-01-01 PROCEDURE — 80100008 HC CRRT DAILY MAINTENANCE

## 2017-01-01 PROCEDURE — 90945 DIALYSIS ONE EVALUATION: CPT

## 2017-01-01 PROCEDURE — 85025 COMPLETE CBC W/AUTO DIFF WBC: CPT

## 2017-01-01 PROCEDURE — 25000003 PHARM REV CODE 250: Performed by: EMERGENCY MEDICINE

## 2017-01-01 PROCEDURE — 80069 RENAL FUNCTION PANEL: CPT | Mod: 91

## 2017-01-01 PROCEDURE — 94640 AIRWAY INHALATION TREATMENT: CPT

## 2017-01-01 PROCEDURE — 63600175 PHARM REV CODE 636 W HCPCS: Performed by: INTERNAL MEDICINE

## 2017-01-01 PROCEDURE — 20000000 HC ICU ROOM

## 2017-01-01 PROCEDURE — P9017 PLASMA 1 DONOR FRZ W/IN 8 HR: HCPCS

## 2017-01-01 PROCEDURE — 63600175 PHARM REV CODE 636 W HCPCS: Performed by: HOSPITALIST

## 2017-01-01 PROCEDURE — 25000242 PHARM REV CODE 250 ALT 637 W/ HCPCS: Performed by: NURSE PRACTITIONER

## 2017-01-01 PROCEDURE — 25000003 PHARM REV CODE 250: Performed by: INTERNAL MEDICINE

## 2017-01-01 PROCEDURE — 83735 ASSAY OF MAGNESIUM: CPT

## 2017-01-01 PROCEDURE — 84443 ASSAY THYROID STIM HORMONE: CPT

## 2017-01-01 PROCEDURE — 27000202 HC IABP, ADD'L DAY

## 2017-01-01 PROCEDURE — 02HP32Z INSERTION OF MONITORING DEVICE INTO PULMONARY TRUNK, PERCUTANEOUS APPROACH: ICD-10-PCS | Performed by: INTERNAL MEDICINE

## 2017-01-01 PROCEDURE — 36600 WITHDRAWAL OF ARTERIAL BLOOD: CPT

## 2017-01-01 PROCEDURE — 99900035 HC TECH TIME PER 15 MIN (STAT)

## 2017-01-01 PROCEDURE — 99223 1ST HOSP IP/OBS HIGH 75: CPT | Mod: ,,, | Performed by: INTERNAL MEDICINE

## 2017-01-01 PROCEDURE — 27200234 CATH LAB PROCEDURE

## 2017-01-01 PROCEDURE — 63600175 PHARM REV CODE 636 W HCPCS: Performed by: PHYSICIAN ASSISTANT

## 2017-01-01 PROCEDURE — 83735 ASSAY OF MAGNESIUM: CPT | Mod: 91

## 2017-01-01 PROCEDURE — 82803 BLOOD GASES ANY COMBINATION: CPT

## 2017-01-01 PROCEDURE — 85730 THROMBOPLASTIN TIME PARTIAL: CPT

## 2017-01-01 PROCEDURE — 96365 THER/PROPH/DIAG IV INF INIT: CPT

## 2017-01-01 PROCEDURE — 90945 DIALYSIS ONE EVALUATION: CPT | Mod: ,,, | Performed by: NURSE PRACTITIONER

## 2017-01-01 PROCEDURE — 80053 COMPREHEN METABOLIC PANEL: CPT

## 2017-01-01 PROCEDURE — 25000003 PHARM REV CODE 250: Performed by: NURSE ANESTHETIST, CERTIFIED REGISTERED

## 2017-01-01 PROCEDURE — 36415 COLL VENOUS BLD VENIPUNCTURE: CPT

## 2017-01-01 PROCEDURE — 87400 INFLUENZA A/B EACH AG IA: CPT | Mod: 59

## 2017-01-01 PROCEDURE — 99233 SBSQ HOSP IP/OBS HIGH 50: CPT | Mod: ,,, | Performed by: INTERNAL MEDICINE

## 2017-01-01 PROCEDURE — 85610 PROTHROMBIN TIME: CPT

## 2017-01-01 PROCEDURE — A4216 STERILE WATER/SALINE, 10 ML: HCPCS | Performed by: EMERGENCY MEDICINE

## 2017-01-01 PROCEDURE — 25000003 PHARM REV CODE 250: Performed by: HOSPITALIST

## 2017-01-01 PROCEDURE — 86900 BLOOD TYPING SEROLOGIC ABO: CPT

## 2017-01-01 PROCEDURE — 83036 HEMOGLOBIN GLYCOSYLATED A1C: CPT

## 2017-01-01 PROCEDURE — 83605 ASSAY OF LACTIC ACID: CPT

## 2017-01-01 PROCEDURE — 86022 PLATELET ANTIBODIES: CPT | Mod: 91

## 2017-01-01 PROCEDURE — 85520 HEPARIN ASSAY: CPT | Mod: 91

## 2017-01-01 PROCEDURE — 27100094 HC IABP, SET-UP

## 2017-01-01 PROCEDURE — 83880 ASSAY OF NATRIURETIC PEPTIDE: CPT

## 2017-01-01 PROCEDURE — 27000221 HC OXYGEN, UP TO 24 HOURS

## 2017-01-01 PROCEDURE — 83050 HGB METHEMOGLOBIN QUAN: CPT

## 2017-01-01 PROCEDURE — 99253 IP/OBS CNSLTJ NEW/EST LOW 45: CPT | Mod: ,,, | Performed by: INTERNAL MEDICINE

## 2017-01-01 PROCEDURE — 80053 COMPREHEN METABOLIC PANEL: CPT | Mod: 91

## 2017-01-01 PROCEDURE — 94761 N-INVAS EAR/PLS OXIMETRY MLT: CPT

## 2017-01-01 PROCEDURE — 85520 HEPARIN ASSAY: CPT

## 2017-01-01 PROCEDURE — 36620 INSERTION CATHETER ARTERY: CPT

## 2017-01-01 PROCEDURE — 87040 BLOOD CULTURE FOR BACTERIA: CPT

## 2017-01-01 PROCEDURE — 84132 ASSAY OF SERUM POTASSIUM: CPT

## 2017-01-01 PROCEDURE — 4A023N6 MEASUREMENT OF CARDIAC SAMPLING AND PRESSURE, RIGHT HEART, PERCUTANEOUS APPROACH: ICD-10-PCS | Performed by: INTERNAL MEDICINE

## 2017-01-01 PROCEDURE — 93005 ELECTROCARDIOGRAM TRACING: CPT

## 2017-01-01 PROCEDURE — 63600175 PHARM REV CODE 636 W HCPCS

## 2017-01-01 PROCEDURE — 27200923 HC BALLOON CATHETER

## 2017-01-01 PROCEDURE — 96376 TX/PRO/DX INJ SAME DRUG ADON: CPT

## 2017-01-01 PROCEDURE — 80048 BASIC METABOLIC PNL TOTAL CA: CPT

## 2017-01-01 PROCEDURE — 63600175 PHARM REV CODE 636 W HCPCS: Performed by: STUDENT IN AN ORGANIZED HEALTH CARE EDUCATION/TRAINING PROGRAM

## 2017-01-01 PROCEDURE — 99291 CRITICAL CARE FIRST HOUR: CPT | Mod: ,,, | Performed by: INTERNAL MEDICINE

## 2017-01-01 PROCEDURE — 27200188 HC TRANSDUCER, ART ADULT/PEDS

## 2017-01-01 PROCEDURE — 37000009 HC ANESTHESIA EA ADD 15 MINS: Performed by: INTERNAL MEDICINE

## 2017-01-01 PROCEDURE — A4216 STERILE WATER/SALINE, 10 ML: HCPCS

## 2017-01-01 PROCEDURE — 63600175 PHARM REV CODE 636 W HCPCS: Performed by: EMERGENCY MEDICINE

## 2017-01-01 PROCEDURE — 87205 SMEAR GRAM STAIN: CPT

## 2017-01-01 PROCEDURE — 63600367 HC NITRIC OXIDE PER HOUR

## 2017-01-01 PROCEDURE — C1752 CATH,HEMODIALYSIS,SHORT-TERM: HCPCS

## 2017-01-01 PROCEDURE — 84484 ASSAY OF TROPONIN QUANT: CPT

## 2017-01-01 PROCEDURE — 93010 ELECTROCARDIOGRAM REPORT: CPT | Mod: ,,, | Performed by: INTERNAL MEDICINE

## 2017-01-01 PROCEDURE — 96375 TX/PRO/DX INJ NEW DRUG ADDON: CPT

## 2017-01-01 PROCEDURE — 87040 BLOOD CULTURE FOR BACTERIA: CPT | Mod: 59

## 2017-01-01 PROCEDURE — 93306 TTE W/DOPPLER COMPLETE: CPT | Mod: 26,,, | Performed by: INTERNAL MEDICINE

## 2017-01-01 PROCEDURE — 25000242 PHARM REV CODE 250 ALT 637 W/ HCPCS: Performed by: EMERGENCY MEDICINE

## 2017-01-01 PROCEDURE — 25000242 PHARM REV CODE 250 ALT 637 W/ HCPCS: Performed by: INTERNAL MEDICINE

## 2017-01-01 PROCEDURE — 81001 URINALYSIS AUTO W/SCOPE: CPT

## 2017-01-01 PROCEDURE — 84439 ASSAY OF FREE THYROXINE: CPT

## 2017-01-01 PROCEDURE — 43752 NASAL/OROGASTRIC W/TUBE PLMT: CPT

## 2017-01-01 PROCEDURE — 99221 1ST HOSP IP/OBS SF/LOW 40: CPT | Mod: ,,, | Performed by: SURGERY

## 2017-01-01 PROCEDURE — 37799 UNLISTED PX VASCULAR SURGERY: CPT

## 2017-01-01 PROCEDURE — 25000003 PHARM REV CODE 250: Performed by: NURSE PRACTITIONER

## 2017-01-01 PROCEDURE — 12000002 HC ACUTE/MED SURGE SEMI-PRIVATE ROOM

## 2017-01-01 PROCEDURE — 63600175 PHARM REV CODE 636 W HCPCS: Performed by: NURSE ANESTHETIST, CERTIFIED REGISTERED

## 2017-01-01 PROCEDURE — 25000003 PHARM REV CODE 250

## 2017-01-01 PROCEDURE — 84100 ASSAY OF PHOSPHORUS: CPT

## 2017-01-01 PROCEDURE — 02HV33Z INSERTION OF INFUSION DEVICE INTO SUPERIOR VENA CAVA, PERCUTANEOUS APPROACH: ICD-10-PCS | Performed by: INTERNAL MEDICINE

## 2017-01-01 PROCEDURE — 5A02210 ASSISTANCE WITH CARDIAC OUTPUT USING BALLOON PUMP, CONTINUOUS: ICD-10-PCS | Performed by: INTERNAL MEDICINE

## 2017-01-01 PROCEDURE — 84295 ASSAY OF SERUM SODIUM: CPT

## 2017-01-01 PROCEDURE — 96374 THER/PROPH/DIAG INJ IV PUSH: CPT

## 2017-01-01 PROCEDURE — 87086 URINE CULTURE/COLONY COUNT: CPT

## 2017-01-01 PROCEDURE — 90945 DIALYSIS ONE EVALUATION: CPT | Mod: ,,, | Performed by: INTERNAL MEDICINE

## 2017-01-01 PROCEDURE — 85014 HEMATOCRIT: CPT

## 2017-01-01 PROCEDURE — 80069 RENAL FUNCTION PANEL: CPT

## 2017-01-01 PROCEDURE — 25500020 PHARM REV CODE 255: Performed by: EMERGENCY MEDICINE

## 2017-01-01 PROCEDURE — 37000008 HC ANESTHESIA 1ST 15 MINUTES: Performed by: INTERNAL MEDICINE

## 2017-01-01 PROCEDURE — 99222 1ST HOSP IP/OBS MODERATE 55: CPT | Mod: ,,, | Performed by: UROLOGY

## 2017-01-01 PROCEDURE — 99285 EMERGENCY DEPT VISIT HI MDM: CPT | Mod: 25

## 2017-01-01 PROCEDURE — 86022 PLATELET ANTIBODIES: CPT

## 2017-01-01 PROCEDURE — 94667 MNPJ CHEST WALL 1ST: CPT

## 2017-01-01 PROCEDURE — 82330 ASSAY OF CALCIUM: CPT

## 2017-01-01 PROCEDURE — 93306 TTE W/DOPPLER COMPLETE: CPT

## 2017-01-01 PROCEDURE — 63600175 PHARM REV CODE 636 W HCPCS: Performed by: NURSE PRACTITIONER

## 2017-01-01 PROCEDURE — 87070 CULTURE OTHR SPECIMN AEROBIC: CPT

## 2017-01-01 PROCEDURE — 4A133B3 MONITORING OF ARTERIAL PRESSURE, PULMONARY, PERCUTANEOUS APPROACH: ICD-10-PCS | Performed by: INTERNAL MEDICINE

## 2017-01-01 PROCEDURE — 33967 INSERT I-AORT PERCUT DEVICE: CPT | Mod: ,,, | Performed by: INTERNAL MEDICINE

## 2017-01-01 PROCEDURE — 83605 ASSAY OF LACTIC ACID: CPT | Mod: 91

## 2017-01-01 PROCEDURE — 36556 INSERT NON-TUNNEL CV CATH: CPT

## 2017-01-01 PROCEDURE — 86850 RBC ANTIBODY SCREEN: CPT

## 2017-01-01 PROCEDURE — 93451 RIGHT HEART CATH: CPT | Mod: 26,51,, | Performed by: INTERNAL MEDICINE

## 2017-01-01 PROCEDURE — 4A1239Z MONITORING OF CARDIAC OUTPUT, PERCUTANEOUS APPROACH: ICD-10-PCS | Performed by: INTERNAL MEDICINE

## 2017-01-01 PROCEDURE — 99254 IP/OBS CNSLTJ NEW/EST MOD 60: CPT | Mod: ,,, | Performed by: INTERNAL MEDICINE

## 2017-01-01 PROCEDURE — B2141ZZ FLUOROSCOPY OF RIGHT HEART USING LOW OSMOLAR CONTRAST: ICD-10-PCS | Performed by: INTERNAL MEDICINE

## 2017-01-01 PROCEDURE — 94668 MNPJ CHEST WALL SBSQ: CPT

## 2017-01-01 PROCEDURE — 85025 COMPLETE CBC W/AUTO DIFF WBC: CPT | Mod: 91

## 2017-01-01 PROCEDURE — 85379 FIBRIN DEGRADATION QUANT: CPT

## 2017-01-01 RX ORDER — FUROSEMIDE 10 MG/ML
160 INJECTION INTRAMUSCULAR; INTRAVENOUS ONCE
Status: COMPLETED | OUTPATIENT
Start: 2017-01-01 | End: 2017-01-01

## 2017-01-01 RX ORDER — METFORMIN HYDROCHLORIDE 500 MG/1
500 TABLET ORAL 2 TIMES DAILY WITH MEALS
COMMUNITY

## 2017-01-01 RX ORDER — HYDROCODONE BITARTRATE AND ACETAMINOPHEN 500; 5 MG/1; MG/1
TABLET ORAL
Status: DISCONTINUED | OUTPATIENT
Start: 2017-01-01 | End: 2017-01-01 | Stop reason: ALTCHOICE

## 2017-01-01 RX ORDER — DIAZEPAM 5 MG/1
5 TABLET ORAL EVERY 8 HOURS
Status: DISCONTINUED | OUTPATIENT
Start: 2017-01-01 | End: 2017-01-01

## 2017-01-01 RX ORDER — ENOXAPARIN SODIUM 150 MG/ML
1 INJECTION SUBCUTANEOUS
Status: DISCONTINUED | OUTPATIENT
Start: 2017-01-01 | End: 2017-01-01

## 2017-01-01 RX ORDER — MAGNESIUM SULFATE HEPTAHYDRATE 40 MG/ML
2 INJECTION, SOLUTION INTRAVENOUS ONCE
Status: COMPLETED | OUTPATIENT
Start: 2017-01-01 | End: 2017-01-01

## 2017-01-01 RX ORDER — ALBUTEROL SULFATE 2.5 MG/.5ML
2.5 SOLUTION RESPIRATORY (INHALATION) EVERY 4 HOURS
Status: DISCONTINUED | OUTPATIENT
Start: 2017-01-01 | End: 2017-01-01

## 2017-01-01 RX ORDER — MAGNESIUM SULFATE HEPTAHYDRATE 40 MG/ML
2 INJECTION, SOLUTION INTRAVENOUS
Status: DISCONTINUED | OUTPATIENT
Start: 2017-01-01 | End: 2017-01-01 | Stop reason: ALTCHOICE

## 2017-01-01 RX ORDER — IBUPROFEN 200 MG
24 TABLET ORAL
Status: DISCONTINUED | OUTPATIENT
Start: 2017-01-01 | End: 2017-12-21 | Stop reason: HOSPADM

## 2017-01-01 RX ORDER — SCOLOPAMINE TRANSDERMAL SYSTEM 1 MG/1
1 PATCH, EXTENDED RELEASE TRANSDERMAL
Status: DISCONTINUED | OUTPATIENT
Start: 2017-01-01 | End: 2017-12-21 | Stop reason: HOSPADM

## 2017-01-01 RX ORDER — ASPIRIN 325 MG
325 TABLET, DELAYED RELEASE (ENTERIC COATED) ORAL
Status: COMPLETED | OUTPATIENT
Start: 2017-01-01 | End: 2017-01-01

## 2017-01-01 RX ORDER — HYDRALAZINE HYDROCHLORIDE 25 MG/1
25 TABLET, FILM COATED ORAL EVERY 8 HOURS
Status: DISCONTINUED | OUTPATIENT
Start: 2017-01-01 | End: 2017-01-01

## 2017-01-01 RX ORDER — ATORVASTATIN CALCIUM 40 MG/1
40 TABLET, FILM COATED ORAL DAILY
Status: DISCONTINUED | OUTPATIENT
Start: 2017-01-01 | End: 2017-01-01 | Stop reason: HOSPADM

## 2017-01-01 RX ORDER — FOLIC ACID 1 MG/1
1 TABLET ORAL DAILY
Status: DISCONTINUED | OUTPATIENT
Start: 2017-01-01 | End: 2017-01-01 | Stop reason: HOSPADM

## 2017-01-01 RX ORDER — ACETAMINOPHEN 325 MG/1
650 TABLET ORAL EVERY 6 HOURS PRN
Refills: 0 | COMMUNITY
Start: 2017-01-01

## 2017-01-01 RX ORDER — HYDROCODONE BITARTRATE AND ACETAMINOPHEN 500; 5 MG/1; MG/1
TABLET ORAL
Status: ACTIVE | OUTPATIENT
Start: 2017-01-01 | End: 2017-01-01

## 2017-01-01 RX ORDER — HEPARIN SODIUM 10000 [USP'U]/100ML
600 INJECTION, SOLUTION INTRAVENOUS CONTINUOUS
Status: DISCONTINUED | OUTPATIENT
Start: 2017-01-01 | End: 2017-01-01

## 2017-01-01 RX ORDER — IPRATROPIUM BROMIDE AND ALBUTEROL SULFATE 2.5; .5 MG/3ML; MG/3ML
3 SOLUTION RESPIRATORY (INHALATION) EVERY 4 HOURS
Status: DISCONTINUED | OUTPATIENT
Start: 2017-01-01 | End: 2017-01-01

## 2017-01-01 RX ORDER — IPRATROPIUM BROMIDE AND ALBUTEROL SULFATE 2.5; .5 MG/3ML; MG/3ML
3 SOLUTION RESPIRATORY (INHALATION) EVERY 4 HOURS PRN
Status: DISCONTINUED | OUTPATIENT
Start: 2017-01-01 | End: 2017-01-01

## 2017-01-01 RX ORDER — SODIUM CHLORIDE 9 MG/ML
INJECTION, SOLUTION INTRAVENOUS CONTINUOUS
Status: DISCONTINUED | OUTPATIENT
Start: 2017-01-01 | End: 2017-01-01

## 2017-01-01 RX ORDER — MORPHINE SULFATE 10 MG/ML
2 INJECTION INTRAMUSCULAR; INTRAVENOUS; SUBCUTANEOUS EVERY 10 MIN PRN
Status: DISCONTINUED | OUTPATIENT
Start: 2017-01-01 | End: 2017-12-21 | Stop reason: HOSPADM

## 2017-01-01 RX ORDER — ACETAMINOPHEN 325 MG/1
650 TABLET ORAL EVERY 4 HOURS PRN
Status: DISCONTINUED | OUTPATIENT
Start: 2017-01-01 | End: 2017-12-21 | Stop reason: HOSPADM

## 2017-01-01 RX ORDER — MAGNESIUM SULFATE HEPTAHYDRATE 40 MG/ML
2 INJECTION, SOLUTION INTRAVENOUS
Status: DISCONTINUED | OUTPATIENT
Start: 2017-01-01 | End: 2017-01-01

## 2017-01-01 RX ORDER — ATROPINE SULFATE 0.1 MG/ML
0.5 INJECTION INTRAVENOUS
Status: DISCONTINUED | OUTPATIENT
Start: 2017-01-01 | End: 2017-12-21 | Stop reason: HOSPADM

## 2017-01-01 RX ORDER — BENZONATATE 200 MG/1
200 CAPSULE ORAL 3 TIMES DAILY PRN
Qty: 20 CAPSULE | Refills: 0 | Status: SHIPPED | OUTPATIENT
Start: 2017-01-01 | End: 2017-01-01

## 2017-01-01 RX ORDER — HEPARIN SODIUM,PORCINE/D5W 25000/250
12 INTRAVENOUS SOLUTION INTRAVENOUS CONTINUOUS
Status: DISCONTINUED | OUTPATIENT
Start: 2017-01-01 | End: 2017-01-01

## 2017-01-01 RX ORDER — HYDROCODONE BITARTRATE AND ACETAMINOPHEN 500; 5 MG/1; MG/1
TABLET ORAL
Status: DISCONTINUED | OUTPATIENT
Start: 2017-01-01 | End: 2017-01-01

## 2017-01-01 RX ORDER — HEPARIN SODIUM 10000 [USP'U]/100ML
600 INJECTION, SOLUTION INTRAVENOUS CONTINUOUS
Status: DISCONTINUED | OUTPATIENT
Start: 2017-01-01 | End: 2017-12-21 | Stop reason: HOSPADM

## 2017-01-01 RX ORDER — SODIUM CHLORIDE 0.9 % (FLUSH) 0.9 %
3 SYRINGE (ML) INJECTION EVERY 8 HOURS
Status: DISCONTINUED | OUTPATIENT
Start: 2017-01-01 | End: 2017-12-21 | Stop reason: HOSPADM

## 2017-01-01 RX ORDER — CLOPIDOGREL BISULFATE 75 MG/1
75 TABLET ORAL DAILY
Status: DISCONTINUED | OUTPATIENT
Start: 2017-01-01 | End: 2017-01-01 | Stop reason: HOSPADM

## 2017-01-01 RX ORDER — THIAMINE HCL 100 MG
100 TABLET ORAL DAILY
Status: DISCONTINUED | OUTPATIENT
Start: 2017-01-01 | End: 2017-01-01 | Stop reason: HOSPADM

## 2017-01-01 RX ORDER — GLUCAGON 1 MG
1 KIT INJECTION
Status: DISCONTINUED | OUTPATIENT
Start: 2017-01-01 | End: 2017-01-01 | Stop reason: HOSPADM

## 2017-01-01 RX ORDER — ASPIRIN 325 MG
325 TABLET ORAL DAILY
Status: DISCONTINUED | OUTPATIENT
Start: 2017-01-01 | End: 2017-01-01 | Stop reason: HOSPADM

## 2017-01-01 RX ORDER — AMOXICILLIN 250 MG
1 CAPSULE ORAL 2 TIMES DAILY
Status: DISCONTINUED | OUTPATIENT
Start: 2017-01-01 | End: 2017-01-01

## 2017-01-01 RX ORDER — HYDROCODONE BITARTRATE AND ACETAMINOPHEN 500; 5 MG/1; MG/1
TABLET ORAL
Status: DISCONTINUED | OUTPATIENT
Start: 2017-01-01 | End: 2017-12-21 | Stop reason: HOSPADM

## 2017-01-01 RX ORDER — TRAMADOL HYDROCHLORIDE 50 MG/1
50 TABLET ORAL EVERY 6 HOURS PRN
Status: DISCONTINUED | OUTPATIENT
Start: 2017-01-01 | End: 2017-01-01 | Stop reason: HOSPADM

## 2017-01-01 RX ORDER — FUROSEMIDE 10 MG/ML
80 INJECTION INTRAMUSCULAR; INTRAVENOUS ONCE
Status: COMPLETED | OUTPATIENT
Start: 2017-01-01 | End: 2017-01-01

## 2017-01-01 RX ORDER — MIDAZOLAM HYDROCHLORIDE 1 MG/ML
INJECTION INTRAMUSCULAR; INTRAVENOUS
Status: DISCONTINUED
Start: 2017-01-01 | End: 2017-01-01 | Stop reason: WASHOUT

## 2017-01-01 RX ORDER — METOPROLOL TARTRATE 1 MG/ML
5 INJECTION, SOLUTION INTRAVENOUS
Status: COMPLETED | OUTPATIENT
Start: 2017-01-01 | End: 2017-01-01

## 2017-01-01 RX ORDER — ESMOLOL HYDROCHLORIDE 20 MG/ML
50 INJECTION, SOLUTION INTRAVENOUS CONTINUOUS
Status: DISCONTINUED | OUTPATIENT
Start: 2017-01-01 | End: 2017-12-21 | Stop reason: HOSPADM

## 2017-01-01 RX ORDER — INSULIN ASPART 100 [IU]/ML
1-10 INJECTION, SOLUTION INTRAVENOUS; SUBCUTANEOUS
Status: DISCONTINUED | OUTPATIENT
Start: 2017-01-01 | End: 2017-01-01 | Stop reason: HOSPADM

## 2017-01-01 RX ORDER — ATORVASTATIN CALCIUM 40 MG/1
40 TABLET, FILM COATED ORAL DAILY
Status: DISCONTINUED | OUTPATIENT
Start: 2017-01-01 | End: 2017-01-01

## 2017-01-01 RX ORDER — MORPHINE SULFATE 8 MG/ML
4 INJECTION INTRAMUSCULAR; INTRAVENOUS; SUBCUTANEOUS EVERY 4 HOURS PRN
Status: DISCONTINUED | OUTPATIENT
Start: 2017-01-01 | End: 2017-01-01

## 2017-01-01 RX ORDER — CLOPIDOGREL BISULFATE 75 MG/1
75 TABLET ORAL DAILY
COMMUNITY

## 2017-01-01 RX ORDER — POTASSIUM CHLORIDE 750 MG/1
10 TABLET, EXTENDED RELEASE ORAL DAILY
COMMUNITY

## 2017-01-01 RX ORDER — GUAIFENESIN 600 MG/1
600 TABLET, EXTENDED RELEASE ORAL 2 TIMES DAILY
Status: DISCONTINUED | OUTPATIENT
Start: 2017-01-01 | End: 2017-01-01

## 2017-01-01 RX ORDER — ACETAMINOPHEN 325 MG/1
650 TABLET ORAL EVERY 6 HOURS PRN
Status: DISCONTINUED | OUTPATIENT
Start: 2017-01-01 | End: 2017-01-01 | Stop reason: HOSPADM

## 2017-01-01 RX ORDER — PHENYLEPHRINE HYDROCHLORIDE 10 MG/ML
INJECTION INTRAVENOUS
Status: DISCONTINUED | OUTPATIENT
Start: 2017-01-01 | End: 2017-01-01 | Stop reason: HOSPADM

## 2017-01-01 RX ORDER — GLUCAGON 1 MG
1 KIT INJECTION
Status: DISCONTINUED | OUTPATIENT
Start: 2017-01-01 | End: 2017-12-21 | Stop reason: HOSPADM

## 2017-01-01 RX ORDER — METOPROLOL TARTRATE 50 MG/1
100 TABLET ORAL 2 TIMES DAILY
Status: DISCONTINUED | OUTPATIENT
Start: 2017-01-01 | End: 2017-01-01 | Stop reason: HOSPADM

## 2017-01-01 RX ORDER — CEFEPIME HYDROCHLORIDE 2 G/50ML
2 INJECTION, SOLUTION INTRAVENOUS
Status: COMPLETED | OUTPATIENT
Start: 2017-01-01 | End: 2017-01-01

## 2017-01-01 RX ORDER — HYDRALAZINE HYDROCHLORIDE 20 MG/ML
20 INJECTION INTRAMUSCULAR; INTRAVENOUS
Status: COMPLETED | OUTPATIENT
Start: 2017-01-01 | End: 2017-01-01

## 2017-01-01 RX ORDER — DIGOXIN 125 MCG
0.12 TABLET ORAL
Status: DISCONTINUED | OUTPATIENT
Start: 2017-01-01 | End: 2017-01-01

## 2017-01-01 RX ORDER — DIAZEPAM 5 MG/ML
5 INJECTION, SOLUTION INTRAMUSCULAR; INTRAVENOUS EVERY 8 HOURS
Status: DISCONTINUED | OUTPATIENT
Start: 2017-01-01 | End: 2017-12-21 | Stop reason: HOSPADM

## 2017-01-01 RX ORDER — SODIUM CHLORIDE 0.9 % (FLUSH) 0.9 %
3 SYRINGE (ML) INJECTION EVERY 6 HOURS PRN
Status: DISCONTINUED | OUTPATIENT
Start: 2017-01-01 | End: 2017-01-01 | Stop reason: HOSPADM

## 2017-01-01 RX ORDER — AMOXICILLIN 500 MG/1
1000 TABLET, FILM COATED ORAL 3 TIMES DAILY
Qty: 42 TABLET | Refills: 0 | Status: SHIPPED | OUTPATIENT
Start: 2017-01-01 | End: 2017-01-01

## 2017-01-01 RX ORDER — SODIUM,POTASSIUM PHOSPHATES 280-250MG
2 POWDER IN PACKET (EA) ORAL
Status: DISCONTINUED | OUTPATIENT
Start: 2017-01-01 | End: 2017-01-01

## 2017-01-01 RX ORDER — DIAZEPAM 10 MG/2ML
5 INJECTION INTRAMUSCULAR
Status: COMPLETED | OUTPATIENT
Start: 2017-01-01 | End: 2017-01-01

## 2017-01-01 RX ORDER — DIGOXIN 0.25 MG/ML
250 INJECTION INTRAMUSCULAR; INTRAVENOUS ONCE
Status: COMPLETED | OUTPATIENT
Start: 2017-01-01 | End: 2017-01-01

## 2017-01-01 RX ORDER — THIAMINE HCL 100 MG
100 TABLET ORAL DAILY
Status: DISCONTINUED | OUTPATIENT
Start: 2017-01-01 | End: 2017-01-01

## 2017-01-01 RX ORDER — IBUPROFEN 200 MG
16 TABLET ORAL
Status: DISCONTINUED | OUTPATIENT
Start: 2017-01-01 | End: 2017-01-01 | Stop reason: HOSPADM

## 2017-01-01 RX ORDER — ASPIRIN 325 MG
325 TABLET ORAL
Status: COMPLETED | OUTPATIENT
Start: 2017-01-01 | End: 2017-01-01

## 2017-01-01 RX ORDER — IBUPROFEN 200 MG
24 TABLET ORAL
Status: DISCONTINUED | OUTPATIENT
Start: 2017-01-01 | End: 2017-01-01 | Stop reason: HOSPADM

## 2017-01-01 RX ORDER — LORAZEPAM 2 MG/ML
1 INJECTION INTRAMUSCULAR EVERY 8 HOURS PRN
Status: DISCONTINUED | OUTPATIENT
Start: 2017-01-01 | End: 2017-01-01

## 2017-01-01 RX ORDER — LIDOCAINE HYDROCHLORIDE 20 MG/ML
INJECTION, SOLUTION EPIDURAL; INFILTRATION; INTRACAUDAL; PERINEURAL
Status: DISCONTINUED
Start: 2017-01-01 | End: 2017-01-01 | Stop reason: WASHOUT

## 2017-01-01 RX ORDER — OXYCODONE HYDROCHLORIDE 5 MG/1
5 TABLET ORAL EVERY 4 HOURS PRN
Status: DISCONTINUED | OUTPATIENT
Start: 2017-01-01 | End: 2017-12-21 | Stop reason: HOSPADM

## 2017-01-01 RX ORDER — NOREPINEPHRINE BITARTRATE/D5W 4MG/250ML
0.05 PLASTIC BAG, INJECTION (ML) INTRAVENOUS CONTINUOUS
Status: DISCONTINUED | OUTPATIENT
Start: 2017-01-01 | End: 2017-01-01

## 2017-01-01 RX ORDER — MAGNESIUM SULFATE HEPTAHYDRATE 40 MG/ML
2 INJECTION, SOLUTION INTRAVENOUS
Status: DISPENSED | OUTPATIENT
Start: 2017-01-01 | End: 2017-01-01

## 2017-01-01 RX ORDER — AMLODIPINE BESYLATE 5 MG/1
10 TABLET ORAL DAILY
Status: DISCONTINUED | OUTPATIENT
Start: 2017-01-01 | End: 2017-01-01 | Stop reason: HOSPADM

## 2017-01-01 RX ORDER — ONDANSETRON 2 MG/ML
8 INJECTION INTRAMUSCULAR; INTRAVENOUS EVERY 8 HOURS PRN
Status: DISCONTINUED | OUTPATIENT
Start: 2017-01-01 | End: 2017-01-01 | Stop reason: HOSPADM

## 2017-01-01 RX ORDER — ACETAMINOPHEN 325 MG/1
650 TABLET ORAL EVERY 6 HOURS PRN
Status: DISCONTINUED | OUTPATIENT
Start: 2017-01-01 | End: 2017-01-01

## 2017-01-01 RX ORDER — EPINEPHRINE 1 MG/ML
INJECTION INTRAMUSCULAR; INTRAVENOUS; SUBCUTANEOUS
Status: COMPLETED
Start: 2017-01-01 | End: 2017-01-01

## 2017-01-01 RX ORDER — ACETAMINOPHEN 325 MG/1
650 TABLET ORAL EVERY 4 HOURS PRN
Status: DISCONTINUED | OUTPATIENT
Start: 2017-01-01 | End: 2017-01-01

## 2017-01-01 RX ORDER — FUROSEMIDE 10 MG/ML
INJECTION INTRAMUSCULAR; INTRAVENOUS
Status: COMPLETED
Start: 2017-01-01 | End: 2017-01-01

## 2017-01-01 RX ORDER — PROPOFOL 10 MG/ML
VIAL (ML) INTRAVENOUS
Status: DISCONTINUED
Start: 2017-01-01 | End: 2017-01-01 | Stop reason: WASHOUT

## 2017-01-01 RX ORDER — MAGNESIUM SULFATE HEPTAHYDRATE 40 MG/ML
2 INJECTION, SOLUTION INTRAVENOUS
Status: DISCONTINUED | OUTPATIENT
Start: 2017-01-01 | End: 2017-12-21 | Stop reason: HOSPADM

## 2017-01-01 RX ORDER — ONDANSETRON 2 MG/ML
4 INJECTION INTRAMUSCULAR; INTRAVENOUS EVERY 12 HOURS PRN
Status: DISCONTINUED | OUTPATIENT
Start: 2017-01-01 | End: 2017-12-21 | Stop reason: HOSPADM

## 2017-01-01 RX ORDER — TIZANIDINE 2 MG/1
2 TABLET ORAL EVERY 8 HOURS PRN
Status: DISCONTINUED | OUTPATIENT
Start: 2017-01-01 | End: 2017-01-01 | Stop reason: HOSPADM

## 2017-01-01 RX ORDER — CLOPIDOGREL BISULFATE 75 MG/1
75 TABLET ORAL DAILY
Status: DISCONTINUED | OUTPATIENT
Start: 2017-01-01 | End: 2017-01-01

## 2017-01-01 RX ORDER — LORAZEPAM 2 MG/ML
1 INJECTION INTRAMUSCULAR EVERY 4 HOURS PRN
Status: DISCONTINUED | OUTPATIENT
Start: 2017-01-01 | End: 2017-12-21 | Stop reason: HOSPADM

## 2017-01-01 RX ORDER — SODIUM CHLORIDE 9 MG/ML
INJECTION, SOLUTION INTRAVENOUS CONTINUOUS
Status: ACTIVE | OUTPATIENT
Start: 2017-01-01 | End: 2017-01-01

## 2017-01-01 RX ORDER — FOLIC ACID 1 MG/1
1 TABLET ORAL DAILY
Status: DISCONTINUED | OUTPATIENT
Start: 2017-01-01 | End: 2017-01-01

## 2017-01-01 RX ORDER — SODIUM CHLORIDE 9 MG/ML
INJECTION, SOLUTION INTRAVENOUS ONCE
Status: COMPLETED | OUTPATIENT
Start: 2017-01-01 | End: 2017-01-01

## 2017-01-01 RX ORDER — MORPHINE SULFATE 10 MG/ML
4 INJECTION INTRAMUSCULAR; INTRAVENOUS; SUBCUTANEOUS EVERY 6 HOURS PRN
Status: DISCONTINUED | OUTPATIENT
Start: 2017-01-01 | End: 2017-01-01 | Stop reason: HOSPADM

## 2017-01-01 RX ORDER — FUROSEMIDE 10 MG/ML
80 INJECTION INTRAMUSCULAR; INTRAVENOUS
Status: COMPLETED | OUTPATIENT
Start: 2017-01-01 | End: 2017-01-01

## 2017-01-01 RX ORDER — INSULIN ASPART 100 [IU]/ML
1-10 INJECTION, SOLUTION INTRAVENOUS; SUBCUTANEOUS
Status: DISCONTINUED | OUTPATIENT
Start: 2017-01-01 | End: 2017-12-21 | Stop reason: HOSPADM

## 2017-01-01 RX ORDER — LIDOCAINE HYDROCHLORIDE 10 MG/ML
INJECTION INFILTRATION; PERINEURAL
Status: DISPENSED
Start: 2017-01-01 | End: 2017-01-01

## 2017-01-01 RX ORDER — LISINOPRIL 5 MG/1
10 TABLET ORAL DAILY
Status: DISCONTINUED | OUTPATIENT
Start: 2017-01-01 | End: 2017-01-01 | Stop reason: HOSPADM

## 2017-01-01 RX ORDER — NOREPINEPHRINE BITARTRATE/D5W 4MG/250ML
PLASTIC BAG, INJECTION (ML) INTRAVENOUS
Status: COMPLETED
Start: 2017-01-01 | End: 2017-01-01

## 2017-01-01 RX ORDER — FUROSEMIDE 40 MG/1
40 TABLET ORAL DAILY
Status: DISCONTINUED | OUTPATIENT
Start: 2017-01-01 | End: 2017-01-01 | Stop reason: HOSPADM

## 2017-01-01 RX ORDER — DILTIAZEM HCL 1 MG/ML
5 INJECTION, SOLUTION INTRAVENOUS CONTINUOUS
Status: DISCONTINUED | OUTPATIENT
Start: 2017-01-01 | End: 2017-01-01

## 2017-01-01 RX ORDER — ALBUTEROL SULFATE 2.5 MG/.5ML
2.5 SOLUTION RESPIRATORY (INHALATION) EVERY 4 HOURS
Status: DISCONTINUED | OUTPATIENT
Start: 2017-01-01 | End: 2017-01-01 | Stop reason: HOSPADM

## 2017-01-01 RX ORDER — ENOXAPARIN SODIUM 150 MG/ML
1 INJECTION SUBCUTANEOUS EVERY 12 HOURS
Status: DISCONTINUED | OUTPATIENT
Start: 2017-01-01 | End: 2017-01-01

## 2017-01-01 RX ORDER — IBUPROFEN 200 MG
16 TABLET ORAL
Status: DISCONTINUED | OUTPATIENT
Start: 2017-01-01 | End: 2017-12-21 | Stop reason: HOSPADM

## 2017-01-01 RX ORDER — LEVOFLOXACIN 750 MG/1
750 TABLET ORAL DAILY
Qty: 7 TABLET | Refills: 0 | Status: SHIPPED | OUTPATIENT
Start: 2017-01-01 | End: 2017-01-01 | Stop reason: HOSPADM

## 2017-01-01 RX ORDER — ISOSORBIDE DINITRATE 10 MG/1
20 TABLET ORAL 3 TIMES DAILY
Status: DISCONTINUED | OUTPATIENT
Start: 2017-01-01 | End: 2017-01-01

## 2017-01-01 RX ORDER — DOPAMINE HYDROCHLORIDE 160 MG/100ML
10 INJECTION, SOLUTION INTRAVENOUS CONTINUOUS
Status: DISCONTINUED | OUTPATIENT
Start: 2017-01-01 | End: 2017-01-01

## 2017-01-01 RX ORDER — HYDROCODONE BITARTRATE AND ACETAMINOPHEN 5; 325 MG/1; MG/1
1 TABLET ORAL EVERY 4 HOURS PRN
Status: DISCONTINUED | OUTPATIENT
Start: 2017-01-01 | End: 2017-12-21 | Stop reason: HOSPADM

## 2017-01-01 RX ORDER — METOPROLOL TARTRATE 50 MG/1
100 TABLET ORAL 2 TIMES DAILY
Status: DISCONTINUED | OUTPATIENT
Start: 2017-01-01 | End: 2017-01-01

## 2017-01-01 RX ORDER — IPRATROPIUM BROMIDE AND ALBUTEROL SULFATE 2.5; .5 MG/3ML; MG/3ML
3 SOLUTION RESPIRATORY (INHALATION) EVERY 6 HOURS
Status: DISCONTINUED | OUTPATIENT
Start: 2017-01-01 | End: 2017-12-21 | Stop reason: HOSPADM

## 2017-01-01 RX ORDER — AMLODIPINE BESYLATE 10 MG/1
10 TABLET ORAL DAILY
COMMUNITY

## 2017-01-01 RX ORDER — ONDANSETRON 2 MG/ML
4 INJECTION INTRAMUSCULAR; INTRAVENOUS EVERY 8 HOURS PRN
Status: DISCONTINUED | OUTPATIENT
Start: 2017-01-01 | End: 2017-01-01

## 2017-01-01 RX ORDER — PHENYLEPHRINE HCL IN 0.9% NACL 1 MG/10 ML
SYRINGE (ML) INTRAVENOUS
Status: DISCONTINUED
Start: 2017-01-01 | End: 2017-01-01 | Stop reason: WASHOUT

## 2017-01-01 RX ORDER — POTASSIUM CHLORIDE 20 MEQ/1
40 TABLET, EXTENDED RELEASE ORAL ONCE
Status: COMPLETED | OUTPATIENT
Start: 2017-01-01 | End: 2017-01-01

## 2017-01-01 RX ORDER — ONDANSETRON 2 MG/ML
4 INJECTION INTRAMUSCULAR; INTRAVENOUS EVERY 8 HOURS PRN
Status: DISCONTINUED | OUTPATIENT
Start: 2017-01-01 | End: 2017-12-21 | Stop reason: HOSPADM

## 2017-01-01 RX ORDER — LISINOPRIL 20 MG/1
20 TABLET ORAL DAILY
COMMUNITY

## 2017-01-01 RX ADMIN — PHENYLEPHRINE HYDROCHLORIDE 200 MCG: 10 INJECTION INTRAVENOUS at 11:12

## 2017-01-01 RX ADMIN — FOLIC ACID 1 MG: 1 TABLET ORAL at 09:12

## 2017-01-01 RX ADMIN — SCOPALAMINE 1 PATCH: 1 PATCH, EXTENDED RELEASE TRANSDERMAL at 02:12

## 2017-01-01 RX ADMIN — DIAZEPAM 5 MG: 5 TABLET ORAL at 03:12

## 2017-01-01 RX ADMIN — ACETAMINOPHEN 650 MG: 325 TABLET ORAL at 08:12

## 2017-01-01 RX ADMIN — DIAZEPAM 5 MG: 5 TABLET ORAL at 02:12

## 2017-01-01 RX ADMIN — Medication 100 MG: at 08:12

## 2017-01-01 RX ADMIN — Medication 100 MG: at 08:03

## 2017-01-01 RX ADMIN — ALBUTEROL SULFATE 2.5 MG: 2.5 SOLUTION RESPIRATORY (INHALATION) at 11:03

## 2017-01-01 RX ADMIN — THERA TABS 1 TABLET: TAB at 08:12

## 2017-01-01 RX ADMIN — Medication 3 ML: at 05:12

## 2017-01-01 RX ADMIN — MORPHINE SULFATE 4 MG: 10 INJECTION INTRAVENOUS at 02:03

## 2017-01-01 RX ADMIN — DOPAMINE HYDROCHLORIDE 10 MCG/KG/MIN: 40 INJECTION, SOLUTION, CONCENTRATE INTRAVENOUS at 11:12

## 2017-01-01 RX ADMIN — AMIODARONE HYDROCHLORIDE 1 MG/MIN: 1.8 INJECTION, SOLUTION INTRAVENOUS at 11:12

## 2017-01-01 RX ADMIN — DIAZEPAM 5 MG: 5 TABLET ORAL at 05:12

## 2017-01-01 RX ADMIN — RIVAROXABAN 20 MG: 20 TABLET, FILM COATED ORAL at 06:03

## 2017-01-01 RX ADMIN — IPRATROPIUM BROMIDE AND ALBUTEROL SULFATE 3 ML: .5; 3 SOLUTION RESPIRATORY (INHALATION) at 08:12

## 2017-01-01 RX ADMIN — Medication 100 MG: at 10:03

## 2017-01-01 RX ADMIN — HEPARIN SODIUM AND DEXTROSE 12 UNITS/KG/HR: 10000; 5 INJECTION INTRAVENOUS at 07:12

## 2017-01-01 RX ADMIN — VANCOMYCIN HYDROCHLORIDE 1750 MG: 1 INJECTION, POWDER, LYOPHILIZED, FOR SOLUTION INTRAVENOUS at 10:03

## 2017-01-01 RX ADMIN — GUAIFENESIN 600 MG: 600 TABLET, EXTENDED RELEASE ORAL at 09:12

## 2017-01-01 RX ADMIN — POTASSIUM & SODIUM PHOSPHATES POWDER PACK 280-160-250 MG 2 PACKET: 280-160-250 PACK at 05:12

## 2017-01-01 RX ADMIN — DEXTROSE MONOHYDRATE 25 G: 500 INJECTION PARENTERAL at 12:12

## 2017-01-01 RX ADMIN — CALCIUM GLUCONATE 1000 MG: 94 INJECTION, SOLUTION INTRAVENOUS at 02:12

## 2017-01-01 RX ADMIN — CLOPIDOGREL 75 MG: 75 TABLET, FILM COATED ORAL at 10:03

## 2017-01-01 RX ADMIN — SODIUM CHLORIDE: 0.9 INJECTION, SOLUTION INTRAVENOUS at 11:12

## 2017-01-01 RX ADMIN — STANDARDIZED SENNA CONCENTRATE AND DOCUSATE SODIUM 1 TABLET: 8.6; 5 TABLET, FILM COATED ORAL at 08:12

## 2017-01-01 RX ADMIN — Medication 100 MG: at 06:12

## 2017-01-01 RX ADMIN — HYDROCODONE BITARTRATE AND ACETAMINOPHEN 1 TABLET: 5; 325 TABLET ORAL at 09:12

## 2017-01-01 RX ADMIN — DIAZEPAM 5 MG: 5 TABLET ORAL at 09:12

## 2017-01-01 RX ADMIN — THERA TABS 1 TABLET: TAB at 09:12

## 2017-01-01 RX ADMIN — NOREPINEPHRINE BITARTRATE 0.3 MCG/KG/MIN: 1 INJECTION INTRAVENOUS at 01:12

## 2017-01-01 RX ADMIN — EPHEDRINE SULFATE 25 MG: 50 INJECTION, SOLUTION INTRAMUSCULAR; INTRAVENOUS; SUBCUTANEOUS at 11:12

## 2017-01-01 RX ADMIN — HYDRALAZINE HYDROCHLORIDE 20 MG: 20 INJECTION INTRAMUSCULAR; INTRAVENOUS at 05:03

## 2017-01-01 RX ADMIN — ALBUTEROL SULFATE 2.5 MG: 2.5 SOLUTION RESPIRATORY (INHALATION) at 04:03

## 2017-01-01 RX ADMIN — Medication 3 ML: at 09:12

## 2017-01-01 RX ADMIN — IPRATROPIUM BROMIDE AND ALBUTEROL SULFATE 3 ML: .5; 3 SOLUTION RESPIRATORY (INHALATION) at 11:12

## 2017-01-01 RX ADMIN — TIZANIDINE 2 MG: 2 TABLET ORAL at 02:03

## 2017-01-01 RX ADMIN — VASOPRESSIN 0.04 UNITS/MIN: 20 INJECTION INTRAVENOUS at 08:12

## 2017-01-01 RX ADMIN — Medication 3 ML: at 10:12

## 2017-01-01 RX ADMIN — HEPARIN SODIUM AND DEXTROSE 600 UNITS/HR: 10000; 5 INJECTION INTRAVENOUS at 11:12

## 2017-01-01 RX ADMIN — DIAZEPAM 5 MG: 5 TABLET ORAL at 06:12

## 2017-01-01 RX ADMIN — MAGNESIUM SULFATE IN WATER 2 G: 40 INJECTION, SOLUTION INTRAVENOUS at 01:12

## 2017-01-01 RX ADMIN — AMIODARONE HYDROCHLORIDE 150 MG: 1.5 INJECTION, SOLUTION INTRAVENOUS at 11:12

## 2017-01-01 RX ADMIN — FOLIC ACID 1 MG: 1 TABLET ORAL at 08:12

## 2017-01-01 RX ADMIN — Medication 3 ML: at 06:12

## 2017-01-01 RX ADMIN — MAGNESIUM SULFATE IN WATER 2 G: 40 INJECTION, SOLUTION INTRAVENOUS at 12:12

## 2017-01-01 RX ADMIN — HEPARIN SODIUM AND DEXTROSE 600 UNITS/HR: 10000; 5 INJECTION INTRAVENOUS at 12:12

## 2017-01-01 RX ADMIN — METOROPROLOL TARTRATE 5 MG: 5 INJECTION, SOLUTION INTRAVENOUS at 09:12

## 2017-01-01 RX ADMIN — CALCIUM GLUCONATE 2 G: 94 INJECTION, SOLUTION INTRAVENOUS at 04:12

## 2017-01-01 RX ADMIN — DIAZEPAM 5 MG: 5 TABLET ORAL at 01:12

## 2017-01-01 RX ADMIN — CLOPIDOGREL 75 MG: 75 TABLET, FILM COATED ORAL at 09:12

## 2017-01-01 RX ADMIN — ATORVASTATIN CALCIUM 40 MG: 40 TABLET, FILM COATED ORAL at 11:12

## 2017-01-01 RX ADMIN — DOPAMINE HYDROCHLORIDE 15 MCG/KG/MIN: 40 INJECTION, SOLUTION, CONCENTRATE INTRAVENOUS at 08:12

## 2017-01-01 RX ADMIN — METOPROLOL TARTRATE 100 MG: 50 TABLET ORAL at 09:12

## 2017-01-01 RX ADMIN — IPRATROPIUM BROMIDE AND ALBUTEROL SULFATE 3 ML: .5; 3 SOLUTION RESPIRATORY (INHALATION) at 07:12

## 2017-01-01 RX ADMIN — FOLIC ACID 1 MG: 1 TABLET ORAL at 06:12

## 2017-01-01 RX ADMIN — TRAMADOL HYDROCHLORIDE 50 MG: 50 TABLET, FILM COATED ORAL at 09:03

## 2017-01-01 RX ADMIN — SODIUM CHLORIDE: 0.9 INJECTION, SOLUTION INTRAVENOUS at 09:12

## 2017-01-01 RX ADMIN — STANDARDIZED SENNA CONCENTRATE AND DOCUSATE SODIUM 1 TABLET: 8.6; 5 TABLET, FILM COATED ORAL at 09:12

## 2017-01-01 RX ADMIN — FUROSEMIDE 80 MG: 10 INJECTION, SOLUTION INTRAMUSCULAR; INTRAVENOUS at 04:03

## 2017-01-01 RX ADMIN — DIGOXIN 250 MCG: 0.25 INJECTION INTRAMUSCULAR; INTRAVENOUS at 01:12

## 2017-01-01 RX ADMIN — ATORVASTATIN CALCIUM 40 MG: 40 TABLET, FILM COATED ORAL at 09:12

## 2017-01-01 RX ADMIN — Medication 3 ML: at 02:12

## 2017-01-01 RX ADMIN — MORPHINE SULFATE 2 MG: 10 INJECTION INTRAVENOUS at 10:12

## 2017-01-01 RX ADMIN — SODIUM CHLORIDE 500 ML: 0.9 INJECTION, SOLUTION INTRAVENOUS at 08:12

## 2017-01-01 RX ADMIN — SODIUM CHLORIDE: 0.9 INJECTION, SOLUTION INTRAVENOUS at 05:12

## 2017-01-01 RX ADMIN — ENOXAPARIN SODIUM 120 MG: 150 INJECTION SUBCUTANEOUS at 09:12

## 2017-01-01 RX ADMIN — ATORVASTATIN CALCIUM 40 MG: 40 TABLET, FILM COATED ORAL at 08:03

## 2017-01-01 RX ADMIN — GUAIFENESIN 600 MG: 600 TABLET, EXTENDED RELEASE ORAL at 01:12

## 2017-01-01 RX ADMIN — ASPIRIN 325 MG ORAL TABLET 325 MG: 325 PILL ORAL at 08:03

## 2017-01-01 RX ADMIN — FUROSEMIDE 80 MG: 10 INJECTION, SOLUTION INTRAMUSCULAR; INTRAVENOUS at 02:12

## 2017-01-01 RX ADMIN — METOPROLOL TARTRATE 100 MG: 50 TABLET ORAL at 11:12

## 2017-01-01 RX ADMIN — METOPROLOL TARTRATE 5 MG: 5 INJECTION INTRAVENOUS at 08:12

## 2017-01-01 RX ADMIN — IPRATROPIUM BROMIDE AND ALBUTEROL SULFATE 3 ML: .5; 3 SOLUTION RESPIRATORY (INHALATION) at 12:12

## 2017-01-01 RX ADMIN — CLOPIDOGREL 75 MG: 75 TABLET, FILM COATED ORAL at 11:12

## 2017-01-01 RX ADMIN — LISINOPRIL 10 MG: 5 TABLET ORAL at 08:03

## 2017-01-01 RX ADMIN — LISINOPRIL 10 MG: 5 TABLET ORAL at 10:03

## 2017-01-01 RX ADMIN — DOPAMINE HYDROCHLORIDE 30 MCG/KG/MIN: 40 INJECTION, SOLUTION, CONCENTRATE INTRAVENOUS at 11:12

## 2017-01-01 RX ADMIN — SODIUM CHLORIDE: 0.9 INJECTION, SOLUTION INTRAVENOUS at 10:03

## 2017-01-01 RX ADMIN — ATORVASTATIN CALCIUM 40 MG: 40 TABLET, FILM COATED ORAL at 08:12

## 2017-01-01 RX ADMIN — DILTIAZEM HYDROCHLORIDE 5 MG/HR: 5 INJECTION INTRAVENOUS at 10:12

## 2017-01-01 RX ADMIN — EPINEPHRINE 0.02 MCG/KG/MIN: 1 INJECTION INTRAMUSCULAR; INTRAVENOUS; SUBCUTANEOUS at 11:12

## 2017-01-01 RX ADMIN — VASOPRESSIN 0.04 UNITS/MIN: 20 INJECTION INTRAVENOUS at 03:12

## 2017-01-01 RX ADMIN — FUROSEMIDE 80 MG: 10 INJECTION, SOLUTION INTRAMUSCULAR; INTRAVENOUS at 12:12

## 2017-01-01 RX ADMIN — DIAZEPAM 5 MG: 5 TABLET ORAL at 10:12

## 2017-01-01 RX ADMIN — CALCIUM GLUCONATE 1000 MG: 94 INJECTION, SOLUTION INTRAVENOUS at 06:12

## 2017-01-01 RX ADMIN — AZITHROMYCIN MONOHYDRATE 500 MG: 500 INJECTION, POWDER, LYOPHILIZED, FOR SOLUTION INTRAVENOUS at 10:03

## 2017-01-01 RX ADMIN — MAGNESIUM SULFATE IN WATER 2 G: 40 INJECTION, SOLUTION INTRAVENOUS at 07:12

## 2017-01-01 RX ADMIN — NOREPINEPHRINE-DEXTROSE IV SOLUTION 4 MG/250ML-5% 0.6 MCG/KG/MIN: 4-5/250 SOLUTION at 12:12

## 2017-01-01 RX ADMIN — ASPIRIN 325 MG ORAL TABLET 325 MG: 325 PILL ORAL at 06:03

## 2017-01-01 RX ADMIN — AMLODIPINE BESYLATE 10 MG: 5 TABLET ORAL at 08:03

## 2017-01-01 RX ADMIN — NOREPINEPHRINE BITARTRATE 1 MCG/KG/MIN: 1 INJECTION INTRAVENOUS at 02:12

## 2017-01-01 RX ADMIN — CLOPIDOGREL 75 MG: 75 TABLET, FILM COATED ORAL at 08:12

## 2017-01-01 RX ADMIN — NITROGLYCERIN 1 INCH: 20 OINTMENT TOPICAL at 05:03

## 2017-01-01 RX ADMIN — CEFTRIAXONE 2 G: 2 INJECTION, POWDER, FOR SOLUTION INTRAMUSCULAR; INTRAVENOUS at 01:03

## 2017-01-01 RX ADMIN — NOREPINEPHRINE BITARTRATE 0.2 MCG/KG/MIN: 1 INJECTION INTRAVENOUS at 12:12

## 2017-01-01 RX ADMIN — DOPAMINE HYDROCHLORIDE 15 MCG/KG/MIN: 40 INJECTION, SOLUTION, CONCENTRATE INTRAVENOUS at 04:12

## 2017-01-01 RX ADMIN — DOPAMINE HYDROCHLORIDE 15 MCG/KG/MIN: 40 INJECTION, SOLUTION, CONCENTRATE INTRAVENOUS at 11:12

## 2017-01-01 RX ADMIN — STANDARDIZED SENNA CONCENTRATE AND DOCUSATE SODIUM 1 TABLET: 8.6; 5 TABLET, FILM COATED ORAL at 11:12

## 2017-01-01 RX ADMIN — FUROSEMIDE 160 MG: 10 INJECTION, SOLUTION INTRAMUSCULAR; INTRAVENOUS at 04:12

## 2017-01-01 RX ADMIN — ALBUTEROL SULFATE 2.5 MG: 2.5 SOLUTION RESPIRATORY (INHALATION) at 07:03

## 2017-01-01 RX ADMIN — Medication 0.5 MCG/KG/MIN: at 02:12

## 2017-01-01 RX ADMIN — Medication 3 ML: at 01:12

## 2017-01-01 RX ADMIN — CEFTRIAXONE 2 G: 2 INJECTION, POWDER, FOR SOLUTION INTRAMUSCULAR; INTRAVENOUS at 02:03

## 2017-01-01 RX ADMIN — AMIODARONE HYDROCHLORIDE 0.5 MG/MIN: 1.8 INJECTION, SOLUTION INTRAVENOUS at 04:12

## 2017-01-01 RX ADMIN — CEFEPIME HYDROCHLORIDE 2 G: 2 INJECTION, SOLUTION INTRAVENOUS at 07:03

## 2017-01-01 RX ADMIN — DOPAMINE HYDROCHLORIDE 10 MCG/KG/MIN: 40 INJECTION, SOLUTION, CONCENTRATE INTRAVENOUS at 02:12

## 2017-01-01 RX ADMIN — LORAZEPAM 1 MG: 2 INJECTION INTRAMUSCULAR; INTRAVENOUS at 01:12

## 2017-01-01 RX ADMIN — AMIODARONE HYDROCHLORIDE 0.5 MG/MIN: 1.8 INJECTION, SOLUTION INTRAVENOUS at 06:12

## 2017-01-01 RX ADMIN — SODIUM CHLORIDE: 0.9 INJECTION, SOLUTION INTRAVENOUS at 12:12

## 2017-01-01 RX ADMIN — CALCIUM GLUCONATE 2000 MG: 94 INJECTION, SOLUTION INTRAVENOUS at 12:12

## 2017-01-01 RX ADMIN — METOPROLOL TARTRATE 100 MG: 50 TABLET ORAL at 09:03

## 2017-01-01 RX ADMIN — DOPAMINE HYDROCHLORIDE 15 MCG/KG/MIN: 40 INJECTION, SOLUTION, CONCENTRATE INTRAVENOUS at 06:12

## 2017-01-01 RX ADMIN — MORPHINE SULFATE 2 MG: 10 INJECTION INTRAVENOUS at 05:12

## 2017-01-01 RX ADMIN — NOREPINEPHRINE BITARTRATE 0.18 MCG/KG/MIN: 1 INJECTION INTRAVENOUS at 07:12

## 2017-01-01 RX ADMIN — MAGNESIUM SULFATE IN WATER 2 G: 40 INJECTION, SOLUTION INTRAVENOUS at 03:12

## 2017-01-01 RX ADMIN — SODIUM CHLORIDE: 0.9 INJECTION, SOLUTION INTRAVENOUS at 06:12

## 2017-01-01 RX ADMIN — Medication 16 G: at 07:12

## 2017-01-01 RX ADMIN — CALCIUM GLUCONATE 1000 MG: 94 INJECTION, SOLUTION INTRAVENOUS at 04:12

## 2017-01-01 RX ADMIN — ENOXAPARIN SODIUM 110 MG: 150 INJECTION SUBCUTANEOUS at 11:12

## 2017-01-01 RX ADMIN — CALCIUM GLUCONATE 1000 MG: 94 INJECTION, SOLUTION INTRAVENOUS at 07:12

## 2017-01-01 RX ADMIN — SODIUM CHLORIDE: 900 INJECTION, SOLUTION INTRAVENOUS at 07:12

## 2017-01-01 RX ADMIN — IOHEXOL 80 ML: 350 INJECTION, SOLUTION INTRAVENOUS at 09:12

## 2017-01-01 RX ADMIN — POTASSIUM CHLORIDE 40 MEQ: 1500 TABLET, EXTENDED RELEASE ORAL at 05:03

## 2017-01-01 RX ADMIN — METOPROLOL TARTRATE 100 MG: 50 TABLET ORAL at 08:03

## 2017-01-01 RX ADMIN — ASPIRIN 325 MG: 325 TABLET, DELAYED RELEASE ORAL at 09:12

## 2017-01-01 RX ADMIN — AMIODARONE HYDROCHLORIDE 0.5 MG/MIN: 1.8 INJECTION, SOLUTION INTRAVENOUS at 05:12

## 2017-01-01 RX ADMIN — SODIUM CHLORIDE 1000 ML: 0.9 INJECTION, SOLUTION INTRAVENOUS at 10:12

## 2017-01-01 RX ADMIN — Medication 3 MCG/KG/MIN: at 11:12

## 2017-01-01 RX ADMIN — DOPAMINE HYDROCHLORIDE 15 MCG/KG/MIN: 40 INJECTION, SOLUTION, CONCENTRATE INTRAVENOUS at 02:12

## 2017-01-01 RX ADMIN — IPRATROPIUM BROMIDE AND ALBUTEROL SULFATE 3 ML: .5; 3 SOLUTION RESPIRATORY (INHALATION) at 01:12

## 2017-01-01 RX ADMIN — Medication 100 MG: at 09:12

## 2017-01-01 RX ADMIN — FUROSEMIDE 160 MG: 10 INJECTION INTRAMUSCULAR; INTRAVENOUS at 04:12

## 2017-01-01 RX ADMIN — Medication 1000 MG: at 03:12

## 2017-01-01 RX ADMIN — IPRATROPIUM BROMIDE AND ALBUTEROL SULFATE 3 ML: .5; 3 SOLUTION RESPIRATORY (INHALATION) at 02:12

## 2017-01-01 RX ADMIN — DIAZEPAM 5 MG: 5 INJECTION, SOLUTION INTRAMUSCULAR; INTRAVENOUS at 10:12

## 2017-01-01 RX ADMIN — FOLIC ACID 1 MG: 1 TABLET ORAL at 08:03

## 2017-01-01 RX ADMIN — ALBUTEROL SULFATE 2.5 MG: 2.5 SOLUTION RESPIRATORY (INHALATION) at 08:03

## 2017-01-01 RX ADMIN — MAGNESIUM SULFATE IN WATER 2 G: 40 INJECTION, SOLUTION INTRAVENOUS at 05:12

## 2017-01-01 RX ADMIN — CALCIUM GLUCONATE 2000 MG: 94 INJECTION, SOLUTION INTRAVENOUS at 06:12

## 2017-01-01 RX ADMIN — AMLODIPINE BESYLATE 10 MG: 5 TABLET ORAL at 10:03

## 2017-01-01 RX ADMIN — ATORVASTATIN CALCIUM 40 MG: 40 TABLET, FILM COATED ORAL at 10:03

## 2017-01-01 RX ADMIN — NOREPINEPHRINE-DEXTROSE IV SOLUTION 4 MG/250ML-5% 0.05 MCG/KG/MIN: 4-5/250 SOLUTION at 11:12

## 2017-01-01 RX ADMIN — MORPHINE SULFATE 2 MG: 10 INJECTION INTRAVENOUS at 11:12

## 2017-01-01 RX ADMIN — FOLIC ACID 1 MG: 1 TABLET ORAL at 10:03

## 2017-01-01 RX ADMIN — EPINEPHRINE: 1 INJECTION INTRAMUSCULAR; INTRAVENOUS; SUBCUTANEOUS at 01:12

## 2017-01-01 RX ADMIN — FUROSEMIDE 40 MG: 40 TABLET ORAL at 08:03

## 2017-01-01 RX ADMIN — THERA TABS 1 TABLET: TAB at 06:12

## 2017-01-01 RX ADMIN — ENOXAPARIN SODIUM 120 MG: 150 INJECTION SUBCUTANEOUS at 10:12

## 2017-01-01 RX ADMIN — SODIUM CHLORIDE: 0.9 INJECTION, SOLUTION INTRAVENOUS at 10:12

## 2017-01-01 RX ADMIN — DOPAMINE HYDROCHLORIDE 10 MCG/KG/MIN: 40 INJECTION, SOLUTION, CONCENTRATE INTRAVENOUS at 09:12

## 2017-01-01 RX ADMIN — IPRATROPIUM BROMIDE AND ALBUTEROL SULFATE 3 ML: .5; 3 SOLUTION RESPIRATORY (INHALATION) at 09:12

## 2017-01-01 RX ADMIN — MAGNESIUM SULFATE IN WATER 2 G: 40 INJECTION, SOLUTION INTRAVENOUS at 04:12

## 2017-01-01 RX ADMIN — NOREPINEPHRINE BITARTRATE 0.3 MCG/KG/MIN: 1 INJECTION INTRAVENOUS at 06:12

## 2017-01-01 RX ADMIN — ESMOLOL HYDROCHLORIDE 50 MCG/KG/MIN: 20 INJECTION INTRAVENOUS at 09:12

## 2017-01-01 RX ADMIN — DIAZEPAM 5 MG: 5 INJECTION, SOLUTION INTRAMUSCULAR; INTRAVENOUS at 05:03

## 2017-01-01 RX ADMIN — Medication 16 G: at 11:12

## 2017-01-01 RX ADMIN — DOPAMINE HYDROCHLORIDE 15 MCG/KG/MIN: 40 INJECTION, SOLUTION, CONCENTRATE INTRAVENOUS at 12:12

## 2017-01-01 RX ADMIN — CLOPIDOGREL 75 MG: 75 TABLET, FILM COATED ORAL at 08:03

## 2017-03-07 PROBLEM — J18.9 PNEUMONIA OF RIGHT LOWER LOBE DUE TO INFECTIOUS ORGANISM: Status: ACTIVE | Noted: 2017-01-01

## 2017-03-07 NOTE — H&P
[facility] Medicine  History & Physical    Patient Name: Altaf Guo  MRN: [id number]  Admission Date: 3/7/2017  Attending Physician: Braydon Peña MD   Primary Care Provider: [facility]         Patient information was obtained from patient.     Subjective:     Principal Problem:Pneumonia of right lower lobe due to infectious organism    Chief Complaint:   Chief Complaint   Patient presents with    Shortness of Breath     Audible crackles.         HPI: 52 y/o male with hx of significant CHF EF of 10%, Afib on Xarelto and stroke in 2016 presents with 4 days of worsening shortness of breath.  Patient then started complaining of cough productive of whitish sputum.  Symptoms were initially mild, but then became constant and more severe.  Patient denies any fever or chills.  Dyspnea was both at rest and exertion.  He presented to ER where labs showed elevated WBC.  CXR showing RLL pneumonia.  No sick contacts.  No other complaints.    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 No pertinent surgical history.    Review of patient's allergies indicates:  No Known Allergies    No current facility-administered medications on file prior to encounter.      Current Outpatient Prescriptions on File Prior to Encounter   Medication Sig    folic acid (FOLVITE) 1 MG tablet Take 1 tablet (1 mg total) by mouth once daily.    lisinopril 10 MG tablet Take 1 tablet (10 mg total) by mouth once daily.    metoprolol tartrate (LOPRESSOR) 100 MG tablet Take 1 tablet (100 mg total) by  mouth 2 (two) times daily.    aspirin (ECOTRIN) 81 MG EC tablet Take 1 tablet (81 mg total) by mouth once daily.    atorvastatin (LIPITOR) 40 MG tablet Take 1 tablet (40 mg total) by mouth once daily.    nicotine (NICODERM CQ) 21 mg/24 hr Place 1 patch onto the skin once daily.    rivaroxaban (XARELTO) 20 mg Tab Take 1 tablet (20 mg total) by mouth daily with dinner or evening meal.    thiamine 100 MG tablet Take 1 tablet (100 mg total) by mouth once daily.     Family History     Problem Relation (Age of Onset)    Arthritis Mother, Maternal Grandmother    Diabetes Maternal Grandmother, Maternal Grandfather    Kidney disease Mother        Social History Main Topics    Smoking status: Current Every Day Smoker     Packs/day: 0.50     Years: 26.00     Types: Cigarettes    Smokeless tobacco: Never Used    Alcohol use 1.8 oz/week     3 Shots of liquor per week      Comment: states quit 1 week ago    Drug use: Yes     Special: Marijuana    Sexual activity: Yes     Partners: Female     Review of Systems   Constitutional: Negative for chills and fever.   HENT: Negative for ear discharge and ear pain.    Eyes: Negative for discharge and itching.   Respiratory: Positive for cough and shortness of breath.    Cardiovascular: Negative for chest pain and palpitations.   Gastrointestinal: Negative for abdominal distention and abdominal pain.   Endocrine: Negative for polyphagia and polyuria.   Genitourinary: Negative for difficulty urinating and dysuria.   Musculoskeletal: Negative for neck pain and neck stiffness.   Skin: Negative for rash and wound.   Neurological: Negative for seizures and syncope.   Psychiatric/Behavioral: Negative for confusion and hallucinations.     Objective:     Vital Signs (Most Recent):  Temp: 98.8 °F (37.1 °C) (03/07/17 1200)  Pulse: 80 (03/07/17 1607)  Resp: 20 (03/07/17 1607)  BP: 136/78 (03/07/17 1200)  SpO2: 100 % (03/07/17 1607) Vital Signs (24h Range):  Temp:  [98 °F (36.7 °C)-99 °F  (37.2 °C)] 98.8 °F (37.1 °C)  Pulse:  [] 80  Resp:  [20-36] 20  SpO2:  [20 %-100 %] 100 %  BP: (126-209)/() 136/78     Weight: 108.4 kg (239 lb)  Body mass index is 29.87 kg/(m^2).    Physical Exam   Constitutional: He is oriented to person, place, and time. He appears well-developed and well-nourished.   HENT:   Head: Normocephalic and atraumatic.   Eyes: Conjunctivae are normal. Pupils are equal, round, and reactive to light.   Neck: Normal range of motion. Neck supple.   Cardiovascular: Normal rate, regular rhythm and normal heart sounds.    Pulmonary/Chest: Effort normal. No respiratory distress. He has no wheezes.   Abdominal: Soft. Bowel sounds are normal.   Musculoskeletal: Normal range of motion. He exhibits no deformity.   Neurological: He is alert and oriented to person, place, and time.   Skin: Skin is warm and dry.        Significant Labs:   BMP:   Recent Labs  Lab 03/07/17  0457   *      K 3.5      CO2 24   BUN 15   CREATININE 1.2   CALCIUM 9.3   MG 1.7     CBC:   Recent Labs  Lab 03/07/17  0457   WBC 15.22*   HGB 17.6   HCT 48.6            Assessment/Plan:     * Pneumonia of right lower lobe due to infectious organism  Probably bacterial, but unable to determine.  Started on Rocephin/Zithromax.  Monitor BCx      Tobacco abuse  Smoking cessation counseling.      Elevated troponin  Denies any chest pain.  This is probably chronic secondary to significant CHF.      Essential hypertension  Continue home regimen.      Type 2 diabetes mellitus, controlled  Does not seem to be on any medications at home.  Diabetic diet and sliding scale.      Chronic combined systolic and diastolic heart failure  No evidence of decompensation.  Continue Lasix, ACEI and B blocker.  EF of 10% on 6/2016        Atrial fibrillation  Currently in sinus.  Continue Xarelto.      VTE Risk Mitigation         Ordered     rivaroxaban tablet 20 mg  With dinner     Route:  Oral        03/07/17 0670      Medium Risk of VTE  Once      03/07/17 0945     Place HENRIQUE hose  Until discontinued      03/07/17 0945     Place sequential compression device  Until discontinued      03/07/17 0945        Braydon Peña MD  Department of Hospital Medicine   [facility]

## 2017-03-07 NOTE — ED TRIAGE NOTES
51 year old male arrives to the ED via personal transportation due to severe SOB after waking up 1hr PTA. Pt has symptoms of productive cough with white sputum, 1 episode of vomiting and is currently diaphoretic. Pt has a hx of HTN. A.fib, DVT, PE, and H/O embolic stroke. Pt denies CP, abdominal pain, fever and diarrhea.

## 2017-03-07 NOTE — IP AVS SNAPSHOT
[place] [address] [address] [place] [address]  Phone: [phone]           Patient Discharge Instructions     Our goal is to set you up for success. This packet includes information on your condition, medications, and your home care. It will help you to care for yourself so you don't get sicker and need to go back to the hospital.     Please ask your nurse if you have any questions.        There are many details to remember when preparing to leave the hospital. Here is what you will need to do:    1. Take your medicine. If you are prescribed medications, review your Medication List in the following pages. You may have new medications to  at the pharmacy and others that you'll need to stop taking. Review the instructions for how and when to take your medications. Talk with your doctor or nurses if you are unsure of what to do.     2. Go to your follow-up appointments. Specific follow-up information is listed in the following pages. Your may be contacted by a transition nurse or clinical provider about future appointments. Be sure we have all of the phone numbers to reach you, if needed. Please contact your provider's office if you are unable to make an appointment.     3. Watch for warning signs. Your doctor or nurse will give you detailed warning signs to watch for and when to call for assistance. These instructions may also include educational information about your condition. If you experience any of warning signs to your health, call your doctor.               [facility]  Unless otherwise directed by your provider, please contact [facility], our nurse care line that is available for 24/7 assistance.     [phone] (toll-free)    Registered nurses in the [facility] provide clinical advisement, health education, appointment booking, and other advisory services.                    ** Verify the list of medication(s) below is accurate and up to date.  Carry this with you in case of emergency. If your medications have changed, please notify your healthcare provider.             Medication List      START taking these medications        Additional Info                      acetaminophen 325 MG tablet   Commonly known as:  TYLENOL   Refills:  0   Dose:  650 mg    Instructions:  Take 2 tablets (650 mg total) by mouth every 6 (six) hours as needed for Temperature greater than (100.4).     Begin Date    AM    Noon    PM    Bedtime       amoxicillin 500 MG Tab   Commonly known as:  AMOXIL   Quantity:  42 tablet   Refills:  0   Dose:  1000 mg    Instructions:  Take 2 tablets (1,000 mg total) by mouth 3 (three) times daily.     Begin Date    AM    Noon    PM    Bedtime       benzonatate 200 MG capsule   Commonly known as:  TESSALON   Quantity:  20 capsule   Refills:  0   Dose:  200 mg    Instructions:  Take 1 capsule (200 mg total) by mouth 3 (three) times daily as needed for Cough.     Begin Date    AM    Noon    PM    Bedtime         CONTINUE taking these medications        Additional Info                      amlodipine 10 MG tablet   Commonly known as:  NORVASC   Refills:  0   Dose:  10 mg    Last time this was given:  10 mg on 3/8/2017  8:36 AM   Instructions:  Take 10 mg by mouth once daily.     Begin Date    AM    Noon    PM    Bedtime       aspirin 81 MG EC tablet   Commonly known as:  ECOTRIN   Refills:  0   Dose:  81 mg    Instructions:  Take 1 tablet (81 mg total) by mouth once daily.     Begin Date    AM    Noon    PM    Bedtime       atorvastatin 40 MG tablet   Commonly known as:  LIPITOR   Quantity:  90 tablet   Refills:  3   Dose:  40 mg    Last time this was given:  40 mg on 3/8/2017  8:36 AM   Instructions:  Take 1 tablet (40 mg total) by mouth once daily.     Begin Date    AM    Noon    PM    Bedtime       clopidogrel 75 mg tablet   Commonly known as:  PLAVIX   Refills:  0   Dose:  75 mg    Last time this was given:  75 mg on 3/8/2017  8:36 AM    Instructions:  Take 75 mg by mouth once daily.     Begin Date    AM    Noon    PM    Bedtime       folic acid 1 MG tablet   Commonly known as:  FOLVITE   Quantity:  90 tablet   Refills:  3   Dose:  1 mg    Last time this was given:  1 mg on 3/8/2017  8:37 AM   Instructions:  Take 1 tablet (1 mg total) by mouth once daily.     Begin Date    AM    Noon    PM    Bedtime       LASIX ORAL   Refills:  0    Last time this was given:  40 mg on 3/8/2017  8:37 AM   Instructions:  Take by mouth.     Begin Date    AM    Noon    PM    Bedtime       lisinopril 10 MG tablet   Quantity:  90 tablet   Refills:  3   Dose:  10 mg    Last time this was given:  10 mg on 3/8/2017  8:37 AM   Instructions:  Take 1 tablet (10 mg total) by mouth once daily.     Begin Date    AM    Noon    PM    Bedtime       metoprolol tartrate 100 MG tablet   Commonly known as:  LOPRESSOR   Quantity:  180 tablet   Refills:  1   Dose:  100 mg    Last time this was given:  100 mg on 3/8/2017  8:36 AM   Instructions:  Take 1 tablet (100 mg total) by mouth 2 (two) times daily.     Begin Date    AM    Noon    PM    Bedtime       nicotine 21 mg/24 hr   Commonly known as:  NICODERM CQ   Refills:  0   Dose:  1 patch    Instructions:  Place 1 patch onto the skin once daily.     Begin Date    AM    Noon    PM    Bedtime       rivaroxaban 20 mg Tab   Commonly known as:  XARELTO   Quantity:  30 tablet   Refills:  0   Dose:  20 mg    Last time this was given:  20 mg on 3/7/2017  6:00 PM   Instructions:  Take 1 tablet (20 mg total) by mouth daily with dinner or evening meal.     Begin Date    AM    Noon    PM    Bedtime       thiamine 100 MG tablet   Refills:  0   Dose:  100 mg    Last time this was given:  100 mg on 3/8/2017  8:37 AM   Instructions:  Take 1 tablet (100 mg total) by mouth once daily.     Begin Date    AM    Noon    PM    Bedtime            Where to Get Your Medications      You can get these medications from any pharmacy     Bring a paper prescription  for each of these medications     amoxicillin 500 MG Tab    benzonatate 200 MG capsule       You don't need a prescription for these medications     acetaminophen 325 MG tablet                  Please bring to all follow up appointments:    1. A copy of your discharge instructions.  2. All medicines you are currently taking in their original bottles.  3. Identification and insurance card.    Please arrive 15 minutes ahead of scheduled appointment time.    Please call 24 hours in advance if you must reschedule your appointment and/or time.        Follow-up Information     Follow up with [facility]. Go on 3/16/2017.    Why:  Outpatient Services, PCP Follow-up Appointment, Please arrive to clinic for 11:00AM, You will Dr. Myles Ayala    Contact information:    [address] [place] [address]  [phone]          Discharge Instructions     Future Orders    Activity as tolerated     Call MD for:  difficulty breathing or increased cough     Call MD for:  increased confusion or weakness     Call MD for:  persistent dizziness, light-headedness, or visual disturbances     Call MD for:  persistent nausea and vomiting or diarrhea     Call MD for:  temperature >100.4     Diet Cardiac     Diet Diabetic 2000 Calories         Discharge Instructions         Pneumonia (Adult)  Pneumonia is an infection deep within the lungs. It is in the small air sacs (alveoli). Pneumonia may be caused by a virus or bacteria. Pneumonia caused by bacteria is usually treated with an antibiotic. Severe cases may need to be treated in the hospital. Milder cases can be treated at home. Symptoms usually start to get better during the first 2 days of treatment.    Home care  Follow these guidelines when caring for yourself at home:  · Rest at home for the first 2 to 3 days, or until you feel stronger. Dont let yourself get overly tired when you go back to your activities.  · Stay away from cigarette smoke - yours or other  peoples.  · You may use acetaminophen or ibuprofen to control fever or pain, unless another medicine was prescribed. If you have chronic liver or kidney disease, talk with your health care provider before using these medicines. Also talk with your provider if youve had a stomach ulcer or GI bleeding. Dont give aspirin to anyone younger than 18 years of age who is ill with a fever. It may cause severe liver damage.  · Your appetite may be poor, so a light diet is fine.  · Drink 6 to 8 glasses of fluids every day to make sure you are getting enough fluids. Beverages can include water, sport drinks, sodas without caffeine, juices, tea, or soup. Fluids will help loosen secretions in the lung. This will make it easier for you to cough up the phlegm (sputum). If you also have heart or kidney disease, check with your health care provider before you drink extra fluids.  · Take antibiotic medicine prescribed until it is all gone, even if you are feeling better after a few days.  Follow-up care  Follow up with your health care provider in the next 2 to 3 days, or as advised. This is to be sure the medicine is helping you get better.  If you are 65 or older, you should get a pneumococcal vaccine and a yearly flu (influenza) shot. You should also get these vaccines if you have chronic lung disease like asthma, emphysema, or COPD. Ask your provider about this.  When to seek medical advice  Call your health care provider right away if any of these occur:  · You dont get better within the first 48 hours of treatment  · Shortness of breath gets worse  · Rapid breathing (more than 25 breaths per minute)  · Coughing up blood  · Chest pain gets worse with breathing  · Fever of 102°F (38°C) or higher that doesnt get better with fever medicine  · Weakness, dizziness, or fainting that gets worse  · Thirst or dry mouth that gets worse  · Sinus pain, headache, or a stiff neck  · Chest pain not caused by coughing  Date Last Reviewed:  "12/23/2014  © 9558-5703 [employer]. [address] [place] [address], [place] [address]. All rights reserved. This information is not intended as a substitute for professional medical care. Always follow your healthcare professional's instructions.            Primary Diagnosis     Your primary diagnosis was:  Pneumonia Of Right Lower Lobe Due To Infectious Organism      Admission Information     Date & Time Provider Department CSN    3/7/2017  4:51 AM Braydon Peña MD [facility] [id number]      Care Providers     Provider Role Specialty Primary office phone    Braydon Peña MD Attending Provider Hospitalist [phone]      Your Vitals Were     BP Pulse Temp Resp Height Weight    145/85 (BP Location: Left arm, Patient Position: Lying, BP Method: Automatic) 74 98.9 °F (37.2 °C) (Oral) 22 6' 3" (1.905 m) 114.2 kg (251 lb 11.2 oz)    SpO2 BMI             98% 31.46 kg/m2         Recent Lab Values        10/2/2013 8/9/2014 8/10/2014 6/5/2016                  2:11 AM  1:43 PM 10:53 AM  1:44 PM        A1C 6.3 (H) 7.1 (H) 6.9 (H) 6.7 (H)                 Pending Labs     Order Current Status    Hemoglobin A1c if not done in past 6 weeks In process    Blood Culture #1 **CANNOT BE ORDERED STAT** Preliminary result    Blood Culture #2 **CANNOT BE ORDERED STAT** Preliminary result      Allergies as of 3/8/2017     No Known Allergies      Advance Directives     An advance directive is a document which, in the event you are no longer able to make decisions for yourself, tells your healthcare team what kind of treatment you do or do not want to receive, or who you would like to make those decisions for you.  If you do not currently have an advance directive, [facility] encourages you to create one.  For more information call:  [phone]), [phone] ([phone]),  or log on to [email or web address].        Smoking Cessation     If you would like to quit smoking:   You may be " eligible for free services if you are a [place] resident and started smoking cigarettes before September 1, 1988.  Call the Smoking Cessation Trust (SCT) toll free at (545) 170-9099 or (171) 365-3850.   Call 1-800-QUIT-NOW if you do not meet the above criteria.            Language Assistance Services     ATTENTION: Language assistance services are available, free of charge. Please call [phone].      ATENCIÓN: Si habla español, tiene a ortiz disposición servicios gratuitos de asistencia lingüística. Llame al [phone].     [facility] Ý: N?u b?n nói Ti?ng Vi?t, có các d?ch v? h? tr? ngôn ng? mi?n phí dành cho b?n. G?i s? [phone].        Stroke Education              Heart Failure Education       Heart Failure: Being Active  You have a condition called heart failure. Being active doesnt mean that you have to wear yourself out. Even a little movement each day helps to strengthen your heart. If you cant get out to exercise, you can do simple stretching and strengthening exercises at home. These are good ways to keep you well-conditioned and prevent you and your heart from becoming excessively weak.    Ideas to get you started  · Add a little movement to things you do now. Walk to mail letters. Park your car at the far end of the parking lot and walk to the store. Walk up a flight of stairs instead of taking the elevator.  · Choose activities you enjoy. You might walk, swim, or ride an exercise bike. Things like gardening and washing the car count, too. Other possibilities include: washing dishes, walking the dog, walking around the mall, and doing aerobic activities with friends.  · Join a group exercise program at a [facility] or [facility], a senior center, or a community center. Or look into a hospital cardiac rehabilitation program. Ask your doctor if you qualify.  Tips to keep you going  · Get up and get dressed each day. Go to a coffee shop and read a newspaper or go somewhere that you'll be in the presence of  other active people. Youll feel more like being active.  · Make a plan. Choose one or more activities that you enjoy and that you can easily do. Then plan to do at least one each day. You might write your plan on a calendar.  · Go with a friend or a group if you like company. This can help you feel supported and stay motivated, too.  · Plan social events that you enjoy. This will keep you mentally engaged as well as physically motivated to do things you find pleasure in.  For your safety  · Talk with your healthcare provider before starting an exercise program.  · Exercise indoors when its too hot or too cold outside, or when the air quality is poor. Try walking at a shopping mall.  · Wear socks and sturdy shoes to maintain your balance and prevent falls.  · Start slowly. Do a few minutes several times a day at first. Increase your time and speed little by little.  · Stop and rest whenever you feel tired or get short of breath.  · Dont push yourself on days when you dont feel well.  Date Last Reviewed: 3/20/2016  © 9682-9219 [employer]. [address] [place] [address]. All rights reserved. This information is not intended as a substitute for professional medical care. Always follow your healthcare professional's instructions.              Heart Failure: Evaluating Your Heart  You have a condition called heart failure. To evaluate your condition, your doctor will examine you, ask questions, and do some tests. Along with looking for signs of heart failure, the doctor looks for any other health problems that may have led to heart failure. The results of your evaluation will help your doctor form a treatment plan.  Health history and physical exam  Your visit will start with a health history. Tell the doctor about any symptoms youve noticed and about all medicines you take. Then youll have a physical exam. This includes listening to your heartbeat and breathing. Youll also be checked for  swelling (edema) in your legs and neck. When you have fluid buildup or fluid in the lungs, it may be called congestive heart failure.  Diagnosing heart failure     During an echocardiogram, sound waves bounce off the heart. These are converted into a picture on the screen.   The following may be done to help your doctor form a diagnosis:  · X-rays show the size and shape of your heart. These pictures can also show fluid in your lungs.  · An electrocardiogram (ECG or EKG) shows the pattern of your heartbeat. Small pads (electrodes) are placed on your chest, arms, and legs. Wires connect the pads to the ECG machine, which records your hearts electrical signals. This can give the doctor information about heart function.  · An echocardiogram uses ultrasound waves to show the structure and movement of your heart muscle. This shows how well the heart pumps. It also shows the thickness of the heart walls, and if the heart is enlarged. It is one of the most useful, non-invasive tests as it provides information about the heart's general function. This helps your doctor make treatment decisions.  · Lab tests evaluate small amounts of blood or urine for signs of problems. A BNP lab test can help diagnose and evaluate heart failure. BNP stands for B-type natriuretic peptide. The ventricles secrete more BNP when heart failure worsens. Lab tests can also provide information about metabolic dysfunction or heart dysfunction.  Your treatment plan  Based on the results of your evaluation and tests, your doctor will develop a treatment plan. This plan is designed to relieve some of your heart failure symptoms and help make you more comfortable. Your treatment plan may include:  · Medicine to help your heart work better and improve your quality of life  · Changes in what you eat and drink to help prevent fluid from backing up in your body  · Daily monitoring of your weight and heart failure symptoms to see how well your treatment plan  is working  · Exercise to help you stay healthy  · Help with quitting smoking  · Emotional and psychological support to help adjust to the changes  · Referrals to other specialists to make sure you are being treated comprehensively  Date Last Reviewed: 3/21/2016  © 2482-5497 [employer]. [address] [place] [address], [place] [address]. All rights reserved. This information is not intended as a substitute for professional medical care. Always follow your healthcare professional's instructions.              Heart Failure: Making Changes to Your Diet  You have a condition called heart failure. When you have heart failure, excess fluid is more likely to build up in your body because your heart isn't working well. This makes the heart work harder to pump blood. Fluid buildup causes symptoms such as shortness of breath and swelling (edema). This is often referred to as congestive heart failure or CHF. Controlling the amount of salt (sodium) you eat may help stop fluid from building up. Your doctor may also tell you to reduce the amount of fluid you drink.  Reading food labels    Your healthcare provider will tell you how much sodium you can eat each day. Read food labels to keep track. Keep in mind that certain foods are high in salt. These include canned, frozen, and processed foods. Check the amount of sodium in each serving. Watch out for high-sodium ingredients. These include MSG (monosodium glutamate), baking soda, and sodium phosphate.   Eating less salt  Give yourself time to get used to eating less salt. It may take a little while. Here are some tips to help:  · Take the saltshaker off the table. Replace it with salt-free herb mixes and spices.  · Eat fresh or plain frozen vegetables. These have much less salt than canned vegetables.  · Choose low-sodium snacks like sodium-free pretzels, crackers, or air-popped popcorn.  · Dont add salt to your food when youre cooking. Instead, season your foods with  pepper, lemon, garlic, or onion.  · When you eat out, ask that your food be cooked without added salt.  · Avoid eating fried foods as these often have a great deal of salt.  If youre told to limit fluids  You may need to limit how much fluid you have to help prevent swelling. This includes anything that is liquid at room temperature, such as ice cream and soup. If your doctor tells you to limit fluid, try these tips:  · Measure drinks in a measuring cup before you drink them. This will help you meet daily goals.  · Chill drinks to make them more refreshing.  · Suck on frozen lemon wedges to quench thirst.  · Only drink when youre thirsty.  · Chew sugarless gum or suck on hard candy to keep your mouth moist.  · Weigh yourself daily to know if your body's fluid content is rising.  My sodium goal  Your healthcare provider may give you a sodium goal to meet each day. This includes sodium found in food as well as salt that you add. My goal is to eat no more than ___________ mg of sodium per day.     When to call your doctor  Call your doctor right away if you have any symptoms of worsening heart failure. These can include:  · Sudden weight gain  · Increased swelling of your legs or ankles  · Trouble breathing when youre resting or at night  · Increase in the number of pillows you have to sleep on  · Chest pain, pressure, discomfort, or pain in the jaw, neck, or back   Date Last Reviewed: 3/21/2016  © 8965-2302 [employer]. [address] [place] [address], [place] [address]. All rights reserved. This information is not intended as a substitute for professional medical care. Always follow your healthcare professional's instructions.              Heart Failure: Medicines to Help Your Heart    You have a condition called heart failure (also known as congestive heart failure, or CHF). Your doctor will likely prescribe medicines for heart failure and any underlying health problems you have. Most heart failure patients  take one or more types of medicinen. Your healthcare provider will work to find the combination of medicines that works best for you.  Heart failure medicines  Here are the most common heart failure medicines:  · ACE inhibitors lower blood pressure and decrease strain on the heart. This makes it easier for the heart to pump. Angiotensin receptor blockers have similar effects. These are prescribed for some patients instead of ACE inhibitors.  · Beta-blockers relieve stress on the heart. They also improve symptoms. They may also improve the heart's pumping action over time.  · Diuretics (also called water pills) help rid your body of excess water. This can help rid your body of swelling (edema). Having less fluid to pump means your heart doesnt have to work as hard. Some diuretics make your body lose a mineral called potassium. Your doctor will tell you if you need to take supplements or eat more foods high in potassium.  · Digoxin helps your heart pump with more strength. This helps your heart pump more blood with each beat. So, more oxygen-rich blood travels to the rest of the body.  · Aldosterone antagonists help alter hormones and decrease strain on the heart.  · Hydralazine and nitrates are two separate medicines used together to treat heart failure. They may come in one combination pill. They lower blood pressure and decrease how hard the heart has to pump.  Medicines for related conditions  Controlling other heart problems helps keep heart failure under control, too. Depending on other heart problems you have, medicines may be prescribed to:  · Lower blood pressure (antihypertensives).  · Lower cholesterol levels (statins).  · Prevent blood clots (anticoagulants or aspirin).  · Keep the heartbeat steady (antiarrhythmics).  Date Last Reviewed: 3/5/2016  © 0942-8956 [employer]. [address] [place] [address], [place] [address]. All rights reserved. This information is not intended as a substitute for  professional medical care. Always follow your healthcare professional's instructions.              Heart Failure: Procedures That May Help    The heart is a muscle that pumps oxygen-rich blood to all parts of the body. When you have heart failure, the heart is not able to pump as well as it should. Blood and fluid may back up into the lungs (congestive heart failure), and some parts of the body dont get enough oxygen-rich blood to work normally. These problems lead to the symptoms of heart failure.     Certain procedures may help the heart pump better in some cases of heart failure. Some procedures are done to treat health problems that may have caused the heart failure such as coronary artery disease or heart rhythm problems. For more serious heart failure, other options are available.  Treating artery and valve problems  If you have coronary artery disease or valve disease, procedures may be done to improve blood flow. This helps the heart pump better, which can improve heart failure symptoms. First, your doctor may do a cardiac catheterization to help detect clogged blood vessels or valve damage. During this procedure, a  thin tube (catheter) in inserted into a blood vessel and guided to the heart. There a dye is injected and a special type of X-ray (angiogram) is taken of the blood vessels. Procedures to open a blocked artery or fix damaged valves can also be done using catheterization.  · Angioplasty uses a balloon-tipped instrument at the end of the catheter. The balloon is inflated to widen the narrowed artery. In many cases, a stent is expanded to further support the narrowed artery. A stent is a metal mesh tube.  · Valve surgery repairs or replacement of faulty valves can also be done during catheterization so blood can flow properly through the chambers of the heart.  Bypass surgery is another option to help treat blocked arteries. It uses a healthy blood vessel from elsewhere in the body. The healthy  blood vessel is attached above and below the blocked area so that blood can flow around the blocked artery.  Treating heart rhythm problems  A device may be placed in the chest to help a weak heart maintain a healthy, heartbeat so the heart can pump more effectively:  · Pacemaker. A pacemaker is an implanted device that regulates your heartbeat electronically. It monitors your heart's rhythm and generates a painless electric impulse that helps the heart beat in a regular rhythm. A pacemaker is programmed to meet your specific heart rhythm needs.  · Biventricular pacing/cardiac resynchronization therapy. A type of pacemaker that paces both pumping chambers of the heart at the same time to coordinate contractions and to improve the heart's function. Some people with heart failure are candidates for this therapy.  · Implantable cardioverter defibrillator. A device similar to a pacemaker that senses when the heart is beating too fast and delivers an electrical shock to convert the fast rhythm to a normal rhythm. This can be a life saving device.  In severe cases  In more serious cases of heart failure when other treatments no longer work, other options may include:  · Ventricular assist devices (VADs). These are mechanical devices used to take over the pumping function for one or both of the heart's ventricles, or pumping chambers. A VAD may be necessary when heart failure progresses to the point that medicines and other treatments no longer help. In some cases, a VAD may be used as a bridge to transplant.  · Heart transplant. This is replacing the diseased heart with a healthy one from a donor. This is an option for a few people who are very sick. A heart transplant is very serious and not an option for all patients. Your doctor can tell you more.  Date Last Reviewed: 3/20/2016  © 0367-6390 [employer]. [address] [place] [address]. All rights reserved. This information is not intended as a  substitute for professional medical care. Always follow your healthcare professional's instructions.              Heart Failure: Tracking Your Weight  You have a condition called heart failure. When you have heart failure, a sudden weight gain or a steady rise in weight is a warning sign that your body is retaining too much water and salt. This could mean your heart failure is getting worse. If left untreated, it can cause problems for your lungs and result in shortness of breath. Weighing yourself each day is the best way to know if youre retaining water. If your weight goes up quickly, call your doctor. You will be given instructions on how to get rid of the excess water. You will likely need medicines and to avoid salt. This will help your heart work better.  Call your doctor if you gain more than 2 pounds in 1 day, more than 5 pounds in 1 week, or whatever weight gain you were told to report by your doctor. This is often a sign of worsening heart failure and needs to be evaluated and treated. Your doctor will tell you what to do next.   Tips for weighing yourself    · Weigh yourself at the same time each morning, wearing the same clothes. Weigh yourself after urinating and before eating.  · Use the same scale each day. Make sure the numbers are easy to read. Put the scale on a flat, hard surface -- not on a rug or carpet.  · Do not stop weighing yourself. If you forget one day, weigh again the next morning.  How to use your weight chart  · Keep your weight chart near the scale. Write your weight on the chart as soon as you get off the scale.  · Fill in the month and the start date on the chart. Then write down your weight each day. Your chart will look like this:    · If you miss a day, leave the space blank. Weigh yourself the next day and write your weight in the next space.  · Take your weight chart with you when you go to see your doctor.  Date Last Reviewed: 3/20/2016  © 5106-3917 [employer] [address] [place] [address]. All rights reserved. This information is not intended as a substitute for professional medical care. Always follow your healthcare professional's instructions.              Heart Failure: Warning Signs of a Flare-Up  You have a condition called heart failure. Once you have heart failure, flare-ups can happen. Below are signs that can mean your heart failure is getting worse. If you notice any of these warning signs, call your healthcare provider.  Swelling    · Your feet, ankles, or lower legs get puffier.  · You notice skin changes on your lower legs.  · Your shoes feel too tight.  · Your clothes are tighter in the waist.  · You have trouble getting rings on or off your fingers.  Shortness of breath  · You have to breathe harder even when youre doing your normal activities or when youre resting.  · You are short of breath walking up stairs or even short distances.  · You wake up at night short of breath or coughing.  · You need to use more pillows or sit up to sleep.  · You wake up tired or restless.  Other warning signs  · You feel weaker, dizzy, or more tired.  · You have chest pain or changes in your heartbeat.  · You have a cough that wont go away.  · You cant remember things or dont feel like eating.  Tracking your weight  Gaining weight is often the first warning sign that heart failure is getting worse. Gaining even a few pounds can be a sign that your body is retaining excess water and salt. Weighing yourself each day in the morning after you urinate and before you eat, is the best way to know if you're retaining water. Get a scale that is easy to read and make sure you wear the same clothes and use the same scale every time you weigh. Your healthcare provider will show you how to track your weight. Call your doctor if you gain more than 2 pounds in 1 day, 5 pounds in 1 week, or whatever weight gain you were told to report by your doctor. This is often a  sign of worsening heart failure and needs to be evaluated and treated before it compromises your breathing. Your doctor will tell you what to do next.    Date Last Reviewed: 3/15/2016  © 4694-8952 [employer]. [address] [place] [address], [place] [address]. All rights reserved. This information is not intended as a substitute for professional medical care. Always follow your healthcare professional's instructions.              Pneumonmia Discharge Instructions                Diabetes Discharge Instructions                                   [employer] Information           [facility] Sign-Up     Activating your [facility] account is as easy as 1-2-3!     1) Visit [email or web address], select Sign Up Now, enter this activation code and your date of birth, then select Next.  [email or web address]  Expires: 4/22/2017  2:22 PM      2) Create a username and password to use when you visit [facility] in the future and select a security question in case you lose your password and select Next.    3) Enter your e-mail address and click Sign Up!    Additional Information  If you have questions, please e-mail [email or web address] or call [phone] to talk to our [facility] staff. Remember, [facility] is NOT to be used for urgent needs. For medical emergencies, dial 911.          [facility] complies with applicable Federal civil rights laws and does not discriminate on the basis of race, color, national origin, age, disability, or sex.

## 2017-03-07 NOTE — SUBJECTIVE & OBJECTIVE
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 No pertinent surgical history.    Review of patient's allergies indicates:  No Known Allergies    No current facility-administered medications on file prior to encounter.      Current Outpatient Prescriptions on File Prior to Encounter   Medication Sig    folic acid (FOLVITE) 1 MG tablet Take 1 tablet (1 mg total) by mouth once daily.    lisinopril 10 MG tablet Take 1 tablet (10 mg total) by mouth once daily.    metoprolol tartrate (LOPRESSOR) 100 MG tablet Take 1 tablet (100 mg total) by mouth 2 (two) times daily.    aspirin (ECOTRIN) 81 MG EC tablet Take 1 tablet (81 mg total) by mouth once daily.    atorvastatin (LIPITOR) 40 MG tablet Take 1 tablet (40 mg total) by mouth once daily.    nicotine (NICODERM CQ) 21 mg/24 hr Place 1 patch onto the skin once daily.    rivaroxaban (XARELTO) 20 mg Tab Take 1 tablet (20 mg total) by mouth daily with dinner or evening meal.    thiamine 100 MG tablet Take 1 tablet (100 mg total) by mouth once daily.     Family History     Problem Relation (Age of Onset)    Arthritis Mother, Maternal Grandmother    Diabetes Maternal Grandmother, Maternal Grandfather    Kidney disease Mother        Social History Main Topics    Smoking status: Current Every Day Smoker     Packs/day: 0.50     Years: 26.00     Types: Cigarettes    Smokeless tobacco: Never Used    Alcohol use 1.8 oz/week     3 Shots of liquor per week      Comment: states quit 1 week ago    Drug use: Yes     Special: Marijuana    Sexual activity: Yes     Partners:  Female     Review of Systems   Constitutional: Negative for chills and fever.   HENT: Negative for ear discharge and ear pain.    Eyes: Negative for discharge and itching.   Respiratory: Positive for cough and shortness of breath.    Cardiovascular: Negative for chest pain and palpitations.   Gastrointestinal: Negative for abdominal distention and abdominal pain.   Endocrine: Negative for polyphagia and polyuria.   Genitourinary: Negative for difficulty urinating and dysuria.   Musculoskeletal: Negative for neck pain and neck stiffness.   Skin: Negative for rash and wound.   Neurological: Negative for seizures and syncope.   Psychiatric/Behavioral: Negative for confusion and hallucinations.     Objective:     Vital Signs (Most Recent):  Temp: 98.8 °F (37.1 °C) (03/07/17 1200)  Pulse: 80 (03/07/17 1607)  Resp: 20 (03/07/17 1607)  BP: 136/78 (03/07/17 1200)  SpO2: 100 % (03/07/17 1607) Vital Signs (24h Range):  Temp:  [98 °F (36.7 °C)-99 °F (37.2 °C)] 98.8 °F (37.1 °C)  Pulse:  [] 80  Resp:  [20-36] 20  SpO2:  [20 %-100 %] 100 %  BP: (126-209)/() 136/78     Weight: 108.4 kg (239 lb)  Body mass index is 29.87 kg/(m^2).    Physical Exam   Constitutional: He is oriented to person, place, and time. He appears well-developed and well-nourished.   HENT:   Head: Normocephalic and atraumatic.   Eyes: Conjunctivae are normal. Pupils are equal, round, and reactive to light.   Neck: Normal range of motion. Neck supple.   Cardiovascular: Normal rate, regular rhythm and normal heart sounds.    Pulmonary/Chest: Effort normal. No respiratory distress. He has no wheezes.   Abdominal: Soft. Bowel sounds are normal.   Musculoskeletal: Normal range of motion. He exhibits no deformity.   Neurological: He is alert and oriented to person, place, and time.   Skin: Skin is warm and dry.        Significant Labs:   BMP:   Recent Labs  Lab 03/07/17  0457   *      K 3.5      CO2 24   BUN 15   CREATININE 1.2    CALCIUM 9.3   MG 1.7     CBC:   Recent Labs  Lab 03/07/17  0457   WBC 15.22*   HGB 17.6   HCT 48.6

## 2017-03-07 NOTE — ED PROVIDER NOTES
Encounter Date: 3/7/2017    SCRIBE #1 NOTE: I, Raven Parsons, am scribing for, and in the presence of,  Zen Mcclain MD. I have scribed the following portions of the note - Other sections scribed: HPI/ROS.       History     Chief Complaint   Patient presents with    Shortness of Breath     Audible crackles.      Review of patient's allergies indicates:  No Known Allergies  HPI Comments: CC: Shortness of Breath    HPI: 51 y.o. M with HTN, alcoholic hepatitis, persistent a-fib, enlarged left atrium, DVT, PE, and H/O embolic stroke presents to the ED c/o severe SOB beginning upon waking 1 hour PTA. Associated symptoms are a productive cough (white sputum), 1 episode of emesis, and diaphoresis. Pt reports he stills smokes. Pt denies abdominal pain, fever, CP, chest tightness, and any other symptoms.      The history is provided by the patient.     Past Medical History:   Diagnosis Date    Alcoholic hepatitis 3/27/2013    Anticoagulant long-term use     Atrial fibrillation 6/7/2016    DVT (deep venous thrombosis)     Embolic stroke involving right middle cerebral artery 6/6/2016    Embolic stroke involving right middle cerebral artery 6/6/2016    Enlarged LA (left atrium) 6/7/2016    Hypertension     Persistent atrial fibrillation 8/9/2014    Pulmonary embolism      History reviewed. No pertinent surgical history.  Family History   Problem Relation Age of Onset    Arthritis Mother     Kidney disease Mother     Arthritis Maternal Grandmother     Diabetes Maternal Grandmother     Diabetes Maternal Grandfather      Social History   Substance Use Topics    Smoking status: Current Every Day Smoker     Packs/day: 0.50     Years: 26.00     Types: Cigarettes    Smokeless tobacco: Never Used    Alcohol use 1.8 oz/week     3 Shots of liquor per week      Comment: states quit 1 week ago     Review of Systems   Constitutional: Positive for diaphoresis. Negative for fever.   HENT: Negative for ear pain and sore  throat.    Eyes: Negative for pain and visual disturbance.   Respiratory: Positive for cough and shortness of breath. Negative for chest tightness.    Cardiovascular: Negative for chest pain.   Gastrointestinal: Positive for vomiting. Negative for abdominal pain and diarrhea.   Genitourinary: Negative for dysuria.   Musculoskeletal: Negative for back pain.   Skin: Negative for rash.   Neurological: Negative for headaches.       Physical Exam   Initial Vitals   BP Pulse Resp Temp SpO2   -- -- -- -- --            Physical Exam   The patient was examined specifically for the following:   General:No significant distress, Good color, Warm and dry. Head and neck:Scalp atraumatic, Neck supple. Neurological:Appropriate conversation, Gross motor deficits. Eyes:Conjugate gaze, Clear corneas. ENT: No epistaxis. Cardiac: Regular rate and rhythm, Grossly normal heart tones. Pulmonary: Wheezing, Rales. Gastrointestinal: Abdominal tenderness, Abdominal distention. Musculoskeletal: Extremity deformity, Apparent pain with range of motion of the joints. Skin: Rash.   The findings on examination were normal except for the following: The patient has rales in all lung fields.  He is diaphoretic and cool to touch.  He is slightly tachycardic.  The heart tones are normal the patient has regular rate and rhythm.  There is no pedal edema.  The abdomen is soft.      ED Course   Critical Care  Date/Time: 3/7/2017 9:55 AM  Performed by: ORALIA LUGO.  Authorized by: ORALIA LUGO   Direct patient critical care time: 20 minutes  Additional history critical care time: 10 minutes  Ordering / reviewing critical care time: 10 minutes  Documentation critical care time: 10 minutes  Consulting other physicians critical care time: 5 minutes  Total critical care time (exclusive of procedural time) : 55 minutes  Critical care time was exclusive of separately billable procedures and treating other patients and teaching time.  Critical care was  necessary to treat or prevent imminent or life-threatening deterioration of the following conditions: cardiac failure and sepsis.  Critical care was time spent personally by me on the following activities: development of treatment plan with patient or surrogate, evaluation of patient's response to treatment, examination of patient, obtaining history from patient or surrogate, ordering and performing treatments and interventions, ordering and review of laboratory studies, ordering and review of radiographic studies, pulse oximetry, re-evaluation of patient's condition and review of old charts.        Labs Reviewed   COMPREHENSIVE METABOLIC PANEL - Abnormal; Notable for the following:        Result Value    Glucose 174 (*)     All other components within normal limits   CBC W/ AUTO DIFFERENTIAL - Abnormal; Notable for the following:     WBC 15.22 (*)     MCH 32.1 (*)     MCHC 36.2 (*)     Gran # 11.2 (*)     Gran% 73.5 (*)     All other components within normal limits   B-TYPE NATRIURETIC PEPTIDE - Abnormal; Notable for the following:      (*)     All other components within normal limits   TROPONIN I - Abnormal; Notable for the following:     Troponin I 0.154 (*)     All other components within normal limits   APTT   PROTIME-INR   MAGNESIUM     EKG Readings: (Independently Interpreted)   Initial Reading: No STEMI.   Normal sinus rhythm, rate 95, prolonged QT, no ST/T-wave abnormalities.       X-Rays:   Independently Interpreted Readings:   Chest X-Ray: Focal region of alveolar consolidation involving the right lower lung zones consistent with pneumonia.  No significant pleural effusion or pneumothorax.     Medical Decision Making:   History:   I obtained history from: someone other than patient.  Old Medical Records: I decided to obtain old medical records.  Old Records Summarized: records from clinic visits and other records.  Independently Interpreted Test(s):   I have ordered and independently interpreted  X-rays - see prior notes.  I have ordered and independently interpreted EKG Reading(s) - see prior notes  Clinical Tests:   Lab Tests: Ordered and Reviewed  Radiological Study: Ordered and Reviewed  Medical Tests: Ordered and Reviewed    Additional Medical Decision Making:   Emergent evaluation a 51-year-old male with numerous comorbidities including atrial fifth, DVT/PE, hypertension, and a remote history of alcoholic hepatitis who presents the emergency department complaining of shortness of breath, productive cough, nausea/vomiting and diaphoresis for the past several hours.  Initial vitals concerning for hypertension - currently afebrile.  Initial concern for CHF exacerbation secondary to hypertensive emergency.  In addition to IV Lasix an aspirin, he has been given IV hydralazine in an effort to established improved blood pressure control.      - Chest x-ray concerning for right lower lobe pneumonia.  Notable labs including elevated white count with left shift, along with elevated BNP and troponin (0.154).  EKG without evidence of acute ischemia or dysrhythmia - troponin at baseline.  Based on current findings, there is concern for suspected sepsis secondary to right lower lobe pneumonia.  He has been pancultured and started on cefepime and vancomycin.  He will be admitted to medicine for further evaluation and management.            Scribe Attestation:   Scribe #1: I performed the above scribed service and the documentation accurately describes the services I performed. I attest to the accuracy of the note.    Attending Attestation:           Physician Attestation for Scribe:  Physician Attestation Statement for Scribe #1: I, Zen Mcclain MD, reviewed documentation, as scribed by Raven Parsons in my presence, and it is both accurate and complete.                 ED Course     Clinical Impression:   The primary encounter diagnosis was Pneumonia of right lower lobe due to infectious organism. A diagnosis of  Shortness of breath was also pertinent to this visit.    Disposition:   Disposition: Admitted       Austen Steel MD  03/08/17 0704       Austen Steel MD  03/08/17 0706

## 2017-03-08 PROBLEM — J18.9 PNEUMONIA OF RIGHT LOWER LOBE DUE TO INFECTIOUS ORGANISM: Status: RESOLVED | Noted: 2017-01-01 | Resolved: 2017-01-01

## 2017-03-08 NOTE — PLAN OF CARE
Patient is discharged, no other need identified.       03/08/17 1135   Discharge Assessment   Assessment Type Discharge Planning Assessment

## 2017-03-08 NOTE — DISCHARGE INSTRUCTIONS
Pneumonia (Adult)  Pneumonia is an infection deep within the lungs. It is in the small air sacs (alveoli). Pneumonia may be caused by a virus or bacteria. Pneumonia caused by bacteria is usually treated with an antibiotic. Severe cases may need to be treated in the hospital. Milder cases can be treated at home. Symptoms usually start to get better during the first 2 days of treatment.    Home care  Follow these guidelines when caring for yourself at home:  · Rest at home for the first 2 to 3 days, or until you feel stronger. Dont let yourself get overly tired when you go back to your activities.  · Stay away from cigarette smoke - yours or other peoples.  · You may use acetaminophen or ibuprofen to control fever or pain, unless another medicine was prescribed. If you have chronic liver or kidney disease, talk with your health care provider before using these medicines. Also talk with your provider if youve had a stomach ulcer or GI bleeding. Dont give aspirin to anyone younger than 18 years of age who is ill with a fever. It may cause severe liver damage.  · Your appetite may be poor, so a light diet is fine.  · Drink 6 to 8 glasses of fluids every day to make sure you are getting enough fluids. Beverages can include water, sport drinks, sodas without caffeine, juices, tea, or soup. Fluids will help loosen secretions in the lung. This will make it easier for you to cough up the phlegm (sputum). If you also have heart or kidney disease, check with your health care provider before you drink extra fluids.  · Take antibiotic medicine prescribed until it is all gone, even if you are feeling better after a few days.  Follow-up care  Follow up with your health care provider in the next 2 to 3 days, or as advised. This is to be sure the medicine is helping you get better.  If you are 65 or older, you should get a pneumococcal vaccine and a yearly flu (influenza) shot. You should also get these vaccines if you have  chronic lung disease like asthma, emphysema, or COPD. Ask your provider about this.  When to seek medical advice  Call your health care provider right away if any of these occur:  · You dont get better within the first 48 hours of treatment  · Shortness of breath gets worse  · Rapid breathing (more than 25 breaths per minute)  · Coughing up blood  · Chest pain gets worse with breathing  · Fever of 102°F (38°C) or higher that doesnt get better with fever medicine  · Weakness, dizziness, or fainting that gets worse  · Thirst or dry mouth that gets worse  · Sinus pain, headache, or a stiff neck  · Chest pain not caused by coughing  Date Last Reviewed: 12/23/2014  © 2070-3320 [employer]. [address] [place] [address], [place] [address]. All rights reserved. This information is not intended as a substitute for professional medical care. Always follow your healthcare professional's instructions.

## 2017-03-08 NOTE — PLAN OF CARE
CARLITO contacted [facility] pharmacy @ [phone] to obtain price for Levaquin and Tessalon, CARLITO spoke to Marialuisa, Levaquin will be $134.24 and Tessalon $27.67. CARLITO informed attending MD Levaquin will be $134.24 and if patient could take another abx, attending MD informed SW he will prescribe Amoxicillin. CARLITO met with patient to instruct him to  medications at [facility] pharmacy because they will be reasonably priced, patient verbalized understanding. CARLITO provided patient with discharge follow-up appointment instruction sheet. CARLITO informed nurse Ene  completed all discharge planning for patient and when ready she can complete her nursing education/discharge with patient.       03/08/17 1119   Final Note   Assessment Type Final Discharge Note   Discharge Disposition Home   Hospital Follow Up  Appt(s) scheduled? Yes   [facility]? n/a   Discharge/Hospital Encounter Summary to ([facility]) PCP n/a   Referral to Outpatient Case Management complete? n/a   Referral to / orders for Home Health Complete? n/a   Any social issues identified prior to discharge? n/a   Right Care Referral Info   Post Acute Recommendation (PCP F/U)

## 2017-03-08 NOTE — PLAN OF CARE
Problem: Pneumonia (Adult)  Goal: Signs and Symptoms of Listed Potential Problems Will be Absent, Minimized or Managed (Pneumonia)  Signs and symptoms of listed potential problems will be absent, minimized or managed by discharge/transition of care (reference Pneumonia (Adult) CPG).   Patient is receiving IV antibiotics. Remains afebrile. Oxygen level remains greater than 92 % on 2liters of oxygen. Patient treated for leg cramps once overnight with relief obtained. Updated patient on plan of care and he verbalized understanding. Safety precautions maintained

## 2017-03-08 NOTE — PLAN OF CARE
CARLITO contacted [facility] pharmacy @ [phone] to obtain price for Levaquin and Tessalon, CARLITO spoke to Marialuisa, Levaquin will be $134.24 and Tessalon $27.67. CARLITO informed attending MD Levaquin will be $134.24 and if patient could take another abx, attending MD informed SW he will prescribe Amoxicillin. CARLITO met with patient to instruct him to  medications at [facility] pharmacy because they will be reasonably priced, patient verbalized understanding. CARLITO informed nurse Ene  completed all discharge planning for patient and when ready she can complete her nursing education/discharge with patient.       03/08/17 1119   Final Note   Assessment Type Final Discharge Note   Discharge Disposition Home   Hospital Follow Up  Appt(s) scheduled? Yes   [facility]   Discharge/Hospital Encounter Summary to ([facility]) PCP n/a   Referral to Outpatient Case Management complete? n/a   Referral to / orders for Home Health Complete? n/a   Any social issues identified prior to discharge? n/a   Right Care Referral Info   Post Acute Recommendation (PCP F/U)

## 2017-03-08 NOTE — PROGRESS NOTES
Follow-up Information     Follow up with [facility]. Go on 3/16/2017.    Why:  Outpatient Services, PCP Follow-up Appointment, Please arrive to clinic for 11:00AM, You will Dr. Myles Ayala    Contact information:    [address] [place] [id number]  [phone]          [facility]    WRITTEN HEALTHCARE AND DISCHARGE INFORMATION                            Help at Home           [phone]  After discharge for assistance [facility] On Call Nurse Care Line 24/7  assistance        Thank you for choosing [facility] for your care.  Please answer any calls you may receive from [facility] we want to continue to support you as you manage your healthcare needs. [facility] is happy to have the opportunity to serve you.     Sincerely,  Your [facility] Team,  Esme Callahan LMSW   II  [phone]

## 2017-03-08 NOTE — PROGRESS NOTES
The pt was received on 2 liters nasal cannula with a sat of 97%. No distress was noted at the time. His breath sounds are coarse. He tolerated his treatment with KONRAD.

## 2017-03-08 NOTE — DISCHARGE SUMMARY
[facility] Hospital Medicine  Discharge Summary      Patient Name: Altaf Guo  MRN: [id number]  Admission Date: 3/7/2017  Hospital Length of Stay: 1 days  Discharge Date and Time:  03/08/2017 9:35 AM  Attending Physician: Braydon Peña MD   Discharging Provider: Braydon Peña MD  Primary Care Provider: [facility]      HPI:   52 y/o male with hx of significant CHF EF of 10%, Afib on Xarelto and stroke in 2016 presents with 4 days of worsening shortness of breath.  Patient then started complaining of cough productive of whitish sputum.  Symptoms were initially mild, but then became constant and more severe.  Patient denies any fever or chills.  Dyspnea was both at rest and exertion.  He presented to ER where labs showed elevated WBC.  CXR showing RLL pneumonia.  No sick contacts.  No other complaints.    * No surgery found *      Indwelling Lines/Drains at time of discharge:   Lines/Drains/Airways          No matching active lines, drains, or airways        Hospital Course:   52 y/o male with CHF EF of 10% presented with shortness of breath and productive cough.  Patient had elevated WBC on labs.  CXR showed right lower lobe pneumonia and patient was admitted on IV Rocephin and Zithromax.  Patient's symptoms quickly improved during hospital stay.  His CHF shows no evidence of decompensation.  He remained afebrile during hospital stay and his leukocytosis has now resolved.  Patient will be discharged home today after his next dose of Rocephin.  He will be discharged with 7 more days of Levaquin PO.  He has been counseled on importance of smoking cessation and needs to follow up with his Cardiologist.  Addendum: Patient cannot afford Levaquin and will be discharged with Amoxicillin 1g tid for 7 days.     Consults:         Pending Diagnostic Studies:     Procedure Component Value Units Date/Time    Hemoglobin A1c if not done in past 6 weeks [[id number]] Collected:  03/08/17  0632    Order Status:  Sent Lab Status:  In process Updated:  03/08/17 0632    Specimen:  Blood from Blood     X-Ray Chest AP Portable [[id number]] Resulted:  03/08/17 0756    Order Status:  Sent Lab Status:  In process Updated:  03/08/17 0914        Final Active Diagnoses:    Diagnosis Date Noted POA    Atrial fibrillation [I48.91] 06/07/2016 Yes    Chronic combined systolic and diastolic heart failure [I50.42] 06/05/2016 Yes     Chronic    Essential hypertension [I10] 06/05/2016 Yes     Chronic    Type 2 diabetes mellitus, controlled [E11.9] 06/05/2016 Yes     Chronic    Elevated troponin [R79.89] 06/05/2016 Yes     Chronic    Tobacco abuse [Z72.0] 08/09/2014 Yes     Chronic      Problems Resolved During this Admission:    Diagnosis Date Noted Date Resolved POA    PRINCIPAL PROBLEM:  Pneumonia of right lower lobe due to infectious organism [J18.1] 03/07/2017 03/08/2017 Yes      No new Assessment & Plan notes have been filed under this hospital service since the last note was generated.  Service: Hospital Medicine      Discharged Condition: stable    Disposition: Home or Self Care    Follow Up:  Follow-up Information     Follow up with [facility] In 2 weeks.    Contact information:    [address] [place] [id number] [phone]          Patient Instructions:     Diet Diabetic 2000 Calories     Diet Cardiac     Activity as tolerated     Call MD for:  temperature >100.4     Call MD for:  persistent nausea and vomiting or diarrhea     Call MD for:  difficulty breathing or increased cough     Call MD for:  persistent dizziness, light-headedness, or visual disturbances     Call MD for:  increased confusion or weakness       Medications:  Reconciled Home Medications:   Current Discharge Medication List      START taking these medications    Details   acetaminophen (TYLENOL) 325 MG tablet Take 2 tablets (650 mg total) by mouth every 6 (six) hours as needed for Temperature greater than  (100.4).  Refills: 0      benzonatate (TESSALON) 200 MG capsule Take 1 capsule (200 mg total) by mouth 3 (three) times daily as needed for Cough.  Qty: 20 capsule, Refills: 0      levoFLOXacin (LEVAQUIN) 750 MG tablet Take 1 tablet (750 mg total) by mouth once daily.  Qty: 7 tablet, Refills: 0         CONTINUE these medications which have NOT CHANGED    Details   amlodipine (NORVASC) 10 MG tablet Take 10 mg by mouth once daily.      clopidogrel (PLAVIX) 75 mg tablet Take 75 mg by mouth once daily.      folic acid (FOLVITE) 1 MG tablet Take 1 tablet (1 mg total) by mouth once daily.  Qty: 90 tablet, Refills: 3      FUROSEMIDE (LASIX ORAL) Take by mouth.      lisinopril 10 MG tablet Take 1 tablet (10 mg total) by mouth once daily.  Qty: 90 tablet, Refills: 3      metoprolol tartrate (LOPRESSOR) 100 MG tablet Take 1 tablet (100 mg total) by mouth 2 (two) times daily.  Qty: 180 tablet, Refills: 1      aspirin (ECOTRIN) 81 MG EC tablet Take 1 tablet (81 mg total) by mouth once daily.  Refills: 0      atorvastatin (LIPITOR) 40 MG tablet Take 1 tablet (40 mg total) by mouth once daily.  Qty: 90 tablet, Refills: 3      nicotine (NICODERM CQ) 21 mg/24 hr Place 1 patch onto the skin once daily.  Refills: 0      rivaroxaban (XARELTO) 20 mg Tab Take 1 tablet (20 mg total) by mouth daily with dinner or evening meal.  Qty: 30 tablet, Refills: 0      thiamine 100 MG tablet Take 1 tablet (100 mg total) by mouth once daily.           Time spent on the discharge of patient: >30 minutes    Braydon Peña MD  Department of Hospital Medicine  [facility]

## 2017-03-09 NOTE — PATIENT INSTRUCTIONS
Discharge Instructions for Pneumonia  You have been diagnosed with pneumonia, a serious lung infection. Most cases of pneumonia are caused by bacteria. Pneumonia most often occurs in older adults, young children, and people with chronic health problems.  Home Care  Take your medication exactly as directed. Dont skip doses. Continue taking your antibiotics as directed until they are all gone--even if you start to feel better. This will prevent the pneumonia from coming back.  Drink at least 8 glasses of water daily, unless directed otherwise. This helps to loosen and thin secretions so that you can cough them up.  Use a cool-mist humidifier in your bedroom. Be sure to clean the humidifier daily.  Coughing up mucus is normal. Dont use medications to suppress your cough unless your cough is dry, painful, or interferes with your sleep. You may use an expectorant if ordered by your doctor.  Warm compresses or a moist heating pad on the lowest setting can be used to relieve chest discomfort. Use several times a day for 15-20 minutes at a time. (To prevent injuring your skin, be sure the temperature of the compress or heating pad is warm, not hot.)  Get plenty of rest until your fever, shortness of breath, and chest pain go away.  Plan to get a flu shot every year.  Ask your doctor about a pneumonia vaccination.  Follow-Up  Make a follow-up appointment as directed by our staff.    When to Seek Medical Attention  Call 911 right away if you have any of the following:  Chest pain  Trouble breathing  Blue lips or fingernails  Otherwise, call your doctor if you have any of the following:  Fever above 100.4°F  Yellow, green, bloody, or smelly sputum  More than normal mucus production  Vomiting   © 3120-0830 [employer], [address] [place] [address], [place] [address]. All rights reserved. This information is not intended as a substitute for professional medical care. Always follow your healthcare professional's instructions.

## 2017-03-09 NOTE — PROGRESS NOTES
C3 nurse attempted to contact patient. The following occurred:   C3 nurse attempted to contact Altaf RIGGS Sari  for a TCC post hospital discharge follow up call. The patient is unable to conduct the call @ this time. The patient requested a callback.    The patient has a scheduled HOSFU appointment with [facility]  on 3/16 @ 1100. Message sent to Physician staff. NON [facility].

## 2017-12-14 PROBLEM — I26.99 ACUTE PULMONARY EMBOLISM: Status: ACTIVE | Noted: 2017-01-01

## 2017-12-14 PROBLEM — R79.89 ELEVATED LFTS: Status: ACTIVE | Noted: 2017-01-01

## 2017-12-14 PROBLEM — R00.0 TACHYCARDIA: Status: ACTIVE | Noted: 2017-01-01

## 2017-12-14 NOTE — ASSESSMENT & PLAN NOTE
Hx of previous DVT/PE.  Stopped taking Xarelto months ago because he was unable to afford it.  Will probably need lifelong anticoagulation.  Coumadin therapy would be most economical option.  Will place on therapeutic Lovenox and start Coumadin tomorrow.  SW consult.

## 2017-12-14 NOTE — CONSULTS
"[facility]  Cardiology  Consult Note    Patient Name: Altaf Guo  MRN: [id number]  Admission Date: 12/14/2017  Hospital Length of Stay: 0 days  Code Status: Full Code   Attending Provider: Braydon Peña MD   Consulting Provider: Tio Gutierrez MD  Primary Care Physician: [facility]  Principal Problem:<principal problem not specified>    Patient information was obtained from patient and ER records.     Consults  Subjective:     Chief Complaint:  SOB     HPI:   Pt is a 52 y.o. male smoker (0.5 ppd) w/ a-fib, enlarged L atrium, HTN, alcoholic hepatitis, and hx of long-term anticoagulant use 2nd/to DVT, PE, and emobolic stroke, presenting to the ED c/o constant leg swelling (including R thigh) and worsening SOB on exertion x 1 week, as well as cough x 2 weeks. Pt states he has not taken Metoprolol in "a few days" because it "makes my stomach feel full and tight, really bloated."     EKG A-flutter 124 NSSTT changes    [facility]  Hx of cardiomyopathy EF 15% - presumed non-ischemic but unclear from old records  Patient denies prior MI or stent  Was on xarelto but states he stopped due to cost and takes plavix alone    Last echo 6/7/16    1 - Concentric hypertrophy.     2 - Severely depressed left ventricular systolic function (EF 10%).     3 - Moderately to severely depressed right ventricular systolic function .     4 - Biatrial enlargement.     5 - Increased central venous pressure.     Past Medical History:   Diagnosis Date    Alcoholic hepatitis 3/27/2013    Anticoagulant long-term use     Atrial fibrillation 6/7/2016    CHF (congestive heart failure)     DVT (deep venous thrombosis)     Embolic stroke involving right middle cerebral artery 6/6/2016    Embolic stroke involving right middle cerebral artery 6/6/2016    Enlarged LA (left atrium) 6/7/2016    Hypertension     Persistent atrial fibrillation 8/9/2014    Pulmonary embolism  "       No past surgical history on file.    Review of patient's allergies indicates:  No Known Allergies    No current facility-administered medications on file prior to encounter.      Current Outpatient Prescriptions on File Prior to Encounter   Medication Sig    acetaminophen (TYLENOL) 325 MG tablet Take 2 tablets (650 mg total) by mouth every 6 (six) hours as needed for Temperature greater than (100.4).    amlodipine (NORVASC) 10 MG tablet Take 10 mg by mouth once daily.    clopidogrel (PLAVIX) 75 mg tablet Take 75 mg by mouth once daily.    folic acid (FOLVITE) 1 MG tablet Take 1 tablet (1 mg total) by mouth once daily.    FUROSEMIDE (LASIX ORAL) Take 40 mg by mouth once daily.     metoprolol tartrate (LOPRESSOR) 100 MG tablet Take 1 tablet (100 mg total) by mouth 2 (two) times daily.    atorvastatin (LIPITOR) 40 MG tablet Take 1 tablet (40 mg total) by mouth once daily.    nicotine (NICODERM CQ) 21 mg/24 hr Place 1 patch onto the skin once daily.    [DISCONTINUED] rivaroxaban (XARELTO) 20 mg Tab Take 1 tablet (20 mg total) by mouth daily with dinner or evening meal.    [DISCONTINUED] thiamine 100 MG tablet Take 1 tablet (100 mg total) by mouth once daily.     Family History     Problem Relation (Age of Onset)    Arthritis Mother, Maternal Grandmother    Diabetes Maternal Grandmother, Maternal Grandfather    Kidney disease Mother        Social History Main Topics    Smoking status: Current Every Day Smoker     Packs/day: 0.50     Years: 26.00     Types: Cigarettes    Smokeless tobacco: Never Used    Alcohol use 1.8 oz/week     3 Shots of liquor per week      Comment: states quit 1 week ago    Drug use: Yes     Types: Marijuana    Sexual activity: Yes     Partners: Female     Review of Systems   Constitution: Negative for decreased appetite.   HENT: Negative for ear discharge.    Eyes: Negative for blurred vision.   Endocrine: Negative for polyphagia.   Skin: Negative for nail changes.    Neurological: Negative for aphonia.   Psychiatric/Behavioral: Negative for hallucinations.     Objective:     Vital Signs (Most Recent):  Temp: 98.2 °F (36.8 °C) (12/14/17 1050)  Pulse: (!) 120 (12/14/17 1050)  Resp: (!) 48 (12/14/17 1050)  BP: 129/88 (12/14/17 1122)  SpO2: 100 % (12/14/17 1050) Vital Signs (24h Range):  Temp:  [98.2 °F (36.8 °C)-98.3 °F (36.8 °C)] 98.2 °F (36.8 °C)  Pulse:  [120-163] 120  Resp:  [18-48] 48  SpO2:  [97 %-100 %] 100 %  BP: (117-146)/() 129/88     Weight: 115.7 kg (255 lb 1.2 oz)  Body mass index is 32.75 kg/m².    SpO2: 100 %  O2 Device (Oxygen Therapy): nasal cannula    No intake or output data in the 24 hours ending 12/14/17 1135    Lines/Drains/Airways     Peripheral Intravenous Line                 Peripheral IV - Single Lumen 12/14/17 0749 Right Antecubital less than 1 day         Peripheral IV - Single Lumen 12/14/17 0952 Right Forearm less than 1 day                Physical Exam   Constitutional: He is oriented to person, place, and time. He appears well-developed and well-nourished.   HENT:   Head: Normocephalic and atraumatic.   Eyes: Conjunctivae are normal. Pupils are equal, round, and reactive to light.   Neck: Normal range of motion. Neck supple.   Cardiovascular: Normal heart sounds and intact distal pulses.  An irregularly irregular rhythm present. Tachycardia present.    Pulmonary/Chest: Effort normal and breath sounds normal.   Abdominal: Soft. Bowel sounds are normal.   Musculoskeletal: Normal range of motion.   Neurological: He is alert and oriented to person, place, and time.   Skin: Skin is warm and dry.       Significant Labs: All pertinent lab results from the last 24 hours have been reviewed.    Significant Imaging: Echocardiogram:   2D echo with color flow doppler:   Results for orders placed or performed during the hospital encounter of 06/05/16   2D echo with color flow doppler   Result Value Ref Range    EF 10 (A) 55 - 65     Assessment and Plan:      Atrial fibrillation    Hx PAF. Currently A-flutter RVR. On IV amiodarone and IV diltiazem. Repeat echo. Possible LORETTA/CV in AM. Restart xarelto - patient states he is willing to try it again if he can get some financial support        History of medication noncompliance             History of pulmonary embolism             Chronic combined systolic and diastolic heart failure    Repeat echo. Diuresis and afterload reduction as tolerated        Type 2 diabetes mellitus, controlled             Essential hypertension             Elevated troponin    Suspect demand ischemia. Consider ischemic evaluation when stable        Alcoholism                 VTE Risk Mitigation         Ordered     rivaroxaban tablet 20 mg  With dinner     Route:  Oral        12/14/17 1125     Medium Risk of VTE  Once      12/14/17 1050     Place HENRIQUE hose  Until discontinued      12/14/17 1050     Place sequential compression device  Until discontinued      12/14/17 1050          Thank you for your consult. I will follow-up with patient. Please contact us if you have any additional questions.    Tio Gutierrez MD  Cardiology   [facility]

## 2017-12-14 NOTE — SUBJECTIVE & OBJECTIVE
Past Medical History:   Diagnosis Date    Alcoholic hepatitis 3/27/2013    Anticoagulant long-term use     Atrial fibrillation 6/7/2016    CHF (congestive heart failure)     DVT (deep venous thrombosis)     Embolic stroke involving right middle cerebral artery 6/6/2016    Embolic stroke involving right middle cerebral artery 6/6/2016    Enlarged LA (left atrium) 6/7/2016    Hypertension     Persistent atrial fibrillation 8/9/2014    Pulmonary embolism        No past surgical history on file.    Review of patient's allergies indicates:  No Known Allergies    No current facility-administered medications on file prior to encounter.      Current Outpatient Prescriptions on File Prior to Encounter   Medication Sig    acetaminophen (TYLENOL) 325 MG tablet Take 2 tablets (650 mg total) by mouth every 6 (six) hours as needed for Temperature greater than (100.4).    amlodipine (NORVASC) 10 MG tablet Take 10 mg by mouth once daily.    clopidogrel (PLAVIX) 75 mg tablet Take 75 mg by mouth once daily.    folic acid (FOLVITE) 1 MG tablet Take 1 tablet (1 mg total) by mouth once daily.    FUROSEMIDE (LASIX ORAL) Take 40 mg by mouth once daily.     metoprolol tartrate (LOPRESSOR) 100 MG tablet Take 1 tablet (100 mg total) by mouth 2 (two) times daily.    atorvastatin (LIPITOR) 40 MG tablet Take 1 tablet (40 mg total) by mouth once daily.    nicotine (NICODERM CQ) 21 mg/24 hr Place 1 patch onto the skin once daily.    [DISCONTINUED] rivaroxaban (XARELTO) 20 mg Tab Take 1 tablet (20 mg total) by mouth daily with dinner or evening meal.    [DISCONTINUED] thiamine 100 MG tablet Take 1 tablet (100 mg total) by mouth once daily.     Family History     Problem Relation (Age of Onset)    Arthritis Mother, Maternal Grandmother    Diabetes Maternal Grandmother, Maternal Grandfather    Kidney disease Mother        Social History Main Topics    Smoking status: Current Every Day Smoker     Packs/day: 0.50     Years: 26.00      Types: Cigarettes    Smokeless tobacco: Never Used    Alcohol use 1.8 oz/week     3 Shots of liquor per week      Comment: states quit 1 week ago    Drug use: Yes     Types: Marijuana    Sexual activity: Yes     Partners: Female     Review of Systems   Constitution: Negative for decreased appetite.   HENT: Negative for ear discharge.    Eyes: Negative for blurred vision.   Endocrine: Negative for polyphagia.   Skin: Negative for nail changes.   Neurological: Negative for aphonia.   Psychiatric/Behavioral: Negative for hallucinations.     Objective:     Vital Signs (Most Recent):  Temp: 98.2 °F (36.8 °C) (12/14/17 1050)  Pulse: (!) 120 (12/14/17 1050)  Resp: (!) 48 (12/14/17 1050)  BP: 129/88 (12/14/17 1122)  SpO2: 100 % (12/14/17 1050) Vital Signs (24h Range):  Temp:  [98.2 °F (36.8 °C)-98.3 °F (36.8 °C)] 98.2 °F (36.8 °C)  Pulse:  [120-163] 120  Resp:  [18-48] 48  SpO2:  [97 %-100 %] 100 %  BP: (117-146)/() 129/88     Weight: 115.7 kg (255 lb 1.2 oz)  Body mass index is 32.75 kg/m².    SpO2: 100 %  O2 Device (Oxygen Therapy): nasal cannula    No intake or output data in the 24 hours ending 12/14/17 1135    Lines/Drains/Airways     Peripheral Intravenous Line                 Peripheral IV - Single Lumen 12/14/17 0749 Right Antecubital less than 1 day         Peripheral IV - Single Lumen 12/14/17 0952 Right Forearm less than 1 day                Physical Exam   Constitutional: He is oriented to person, place, and time. He appears well-developed and well-nourished.   HENT:   Head: Normocephalic and atraumatic.   Eyes: Conjunctivae are normal. Pupils are equal, round, and reactive to light.   Neck: Normal range of motion. Neck supple.   Cardiovascular: Normal heart sounds and intact distal pulses.  An irregularly irregular rhythm present. Tachycardia present.    Pulmonary/Chest: Effort normal and breath sounds normal.   Abdominal: Soft. Bowel sounds are normal.   Musculoskeletal: Normal range of motion.    Neurological: He is alert and oriented to person, place, and time.   Skin: Skin is warm and dry.       Significant Labs: All pertinent lab results from the last 24 hours have been reviewed.    Significant Imaging: Echocardiogram:   2D echo with color flow doppler:   Results for orders placed or performed during the hospital encounter of 06/05/16   2D echo with color flow doppler   Result Value Ref Range    EF 10 (A) 55 - 65

## 2017-12-14 NOTE — ASSESSMENT & PLAN NOTE
Unsure if paroxysmal or chronic.  Presented with rapid AFib.  Initially placed on Diltiazem gtt.  Cardiology consulted and he was switched to Amiodarone gtt.  HR much improved.  Tachycardia also probably exacerbated by PE.

## 2017-12-14 NOTE — HPI
"Pt is a 52 y.o. male smoker (0.5 ppd) w/ a-fib,HTN, alcoholic hepatitis, and hx of long-term anticoagulant use 2nd/to DVT, PE, and embolic stroke, presenting to the ED c/o constant leg swelling (including R thigh) and worsening SOB on exertion x 1 week, as well as cough x 2 weeks. Pt states he has not taken Metoprolol in "a few days" because it "makes my stomach feel full and tight, really bloated."     EKG A-flutter 124 NSSTT changes    [facility]  Hx of cardiomyopathy EF 15% - presumed non-ischemic but unclear from old records  Patient denies prior MI or stent  Was on xarelto but states he stopped due to cost and takes plavix alone      Echo with EF 10-15 %    1 - Severely depressed left ventricular systolic function (EF 10-15%).     2 - Concentric hypertrophy.     3 - Mild left ventricular enlargement.     4 - Biatrial enlargement.     5 - Restrictive LV filling pattern, indicating markedly elevated LAP (grade 3 diastolic dysfunction).     6 - Right ventricular enlargement with moderately to severely depressed systolic function.     7 - The estimated PA systolic pressure is 37 mmHg.     8 - Mild mitral regurgitation.     9 - Moderate tricuspid regurgitation.     10 - Mild pulmonic regurgitation.     11 - Small pericardial effusion.       CT chest PE protocol  small emboli within the basal segmental branches of the right lower lobe, noting evaluation is limited by patient respiratory motion artifact. Clinical correlation advised. No large central pulmonary embolus is apparent.    Focal consolidation in the right lung base. Differential considerations include pneumonia, aspiration, or pulmonary infarction.    Cardiomegaly with small pericardial effusion. Small right pleural effusion.    Mild diffuse bronchial wall thickening.    Nodular thickening of the adrenal glands.     RHC   RA 32  RV 58/12/23  PA 58/28/42  PAWP 40    Patient had an IABP placed right groin for cardiogenic shock and was " transferred to [facility] for further management.There has been difficulty obtaining pulses RLE-U/A arterial showed diffuse decrease in velocities due to poor CO but no arterial occulsion at [facility].Also he was noted to acute elevation in LFTs and worsening renal failure.

## 2017-12-14 NOTE — ASSESSMENT & PLAN NOTE
Hx PAF. Currently A-flutter RVR. On IV amiodarone and IV diltiazem. Repeat echo. Possible LORETTA/CV in AM. Restart xarelto - patient states he is willing to try it again if he can get some financial support

## 2017-12-14 NOTE — SUBJECTIVE & OBJECTIVE
Past Medical History:   Diagnosis Date    Alcoholic hepatitis 3/27/2013    Anticoagulant long-term use     Atrial fibrillation 6/7/2016    CHF (congestive heart failure)     DVT (deep venous thrombosis)     Embolic stroke involving right middle cerebral artery 6/6/2016    Embolic stroke involving right middle cerebral artery 6/6/2016    Enlarged LA (left atrium) 6/7/2016    Hypertension     Persistent atrial fibrillation 8/9/2014    Pulmonary embolism        No past surgical history on file.    Review of patient's allergies indicates:  No Known Allergies    No current facility-administered medications on file prior to encounter.      Current Outpatient Prescriptions on File Prior to Encounter   Medication Sig    acetaminophen (TYLENOL) 325 MG tablet Take 2 tablets (650 mg total) by mouth every 6 (six) hours as needed for Temperature greater than (100.4).    amlodipine (NORVASC) 10 MG tablet Take 10 mg by mouth once daily.    clopidogrel (PLAVIX) 75 mg tablet Take 75 mg by mouth once daily.    folic acid (FOLVITE) 1 MG tablet Take 1 tablet (1 mg total) by mouth once daily.    FUROSEMIDE (LASIX ORAL) Take 40 mg by mouth once daily.     metoprolol tartrate (LOPRESSOR) 100 MG tablet Take 1 tablet (100 mg total) by mouth 2 (two) times daily.    atorvastatin (LIPITOR) 40 MG tablet Take 1 tablet (40 mg total) by mouth once daily.    nicotine (NICODERM CQ) 21 mg/24 hr Place 1 patch onto the skin once daily.    [DISCONTINUED] rivaroxaban (XARELTO) 20 mg Tab Take 1 tablet (20 mg total) by mouth daily with dinner or evening meal.    [DISCONTINUED] thiamine 100 MG tablet Take 1 tablet (100 mg total) by mouth once daily.     Family History     Problem Relation (Age of Onset)    Arthritis Mother, Maternal Grandmother    Diabetes Maternal Grandmother, Maternal Grandfather    Kidney disease Mother        Social History Main Topics    Smoking status: Current Every Day Smoker     Packs/day: 0.50     Years: 26.00      Types: Cigarettes    Smokeless tobacco: Never Used    Alcohol use 1.8 oz/week     3 Shots of liquor per week      Comment: states quit 1 week ago    Drug use: Yes     Types: Marijuana    Sexual activity: Yes     Partners: Female     Review of Systems   Unable to perform ROS: Other (Lethargy)     Objective:     Vital Signs (Most Recent):  Temp: 96.5 °F (35.8 °C) (12/14/17 1501)  Pulse: 79 (12/14/17 1530)  Resp: 14 (12/14/17 1530)  BP: (!) 82/52 (12/14/17 1530)  SpO2: 96 % (12/14/17 1530) Vital Signs (24h Range):  Temp:  [96.5 °F (35.8 °C)-98.3 °F (36.8 °C)] 96.5 °F (35.8 °C)  Pulse:  [] 79  Resp:  [14-48] 14  SpO2:  [79 %-100 %] 96 %  BP: ()/() 82/52     Weight: 115.7 kg (255 lb 1.2 oz)  Body mass index is 32.75 kg/m².    Physical Exam   Constitutional: He appears well-developed. No distress.   HENT:   Head: Normocephalic and atraumatic.   Eyes: Conjunctivae are normal. Right eye exhibits no discharge. Left eye exhibits no discharge.   Neck: Neck supple.   Cardiovascular: Normal heart sounds.    Irregularly irregular   Pulmonary/Chest: Effort normal and breath sounds normal. No stridor.   Abdominal: Soft. Bowel sounds are normal.   Musculoskeletal: He exhibits no deformity.   Neurological:   Lethargic  Awakes to verbal stimuli, but falls back asleep.   Skin: Skin is warm and dry.           Significant Labs:   BMP:   Recent Labs  Lab 12/14/17  0740   *      K 3.8   CL 96   CO2 28   BUN 21*   CREATININE 1.4   CALCIUM 9.2     CBC:   Recent Labs  Lab 12/14/17  0740   WBC 11.47   HGB 15.8   HCT 46.8          Significant Imaging: I have reviewed all pertinent imaging results/findings within the past 24 hours.

## 2017-12-14 NOTE — ED NOTES
Pt resting on stretcher.  Complaints of being hungry.  Denies chest pains or sob at this time.  Skin warm and dry.  Resp effort regular and non-labored.  Bed rails up x 2, stretcher in low position with wheels locked.  Call light within reach.

## 2017-12-14 NOTE — NURSING
Pt arrived to unit at 1050. Pt placed on monitor and oriented to room and unit. Pt instructed on POC and verb understanding.

## 2017-12-14 NOTE — ASSESSMENT & PLAN NOTE
Apparently recently quit.  He was very anxious upon presentation to ICU.  DT prophylaxis with scheduled Valium.  Prn Ativan.   Also give MVI, Thiamine and Folic Acid.

## 2017-12-14 NOTE — HPI
53 y/o male presents with lower extremity swelling and shortness of breath.  Patient was very anxious and restless upon presentation to the ICU.  He was given Ativan and currently very sleepy.  History is obtained from patient's spouse.  Patient started complaining of bilateral lower extremity swelling last week.  He also started complaining of non productive cough.  Wife stating that he also looked short of breath.  Patient did not want to seek medical attention.  Dyspnea greatly worsened yesterday.  Wife insisted on patient coming to ER this morning.  No fever or chills.  Patient has hx of DVT/PE, but stopped taking Xarelto several months ago because he could not afford it.  He also has hx of AFib.  Patient was noted to be tachycardic in ER.  He was placed on Diltiazem gtt and admitted to ICU.  He also apparently stopped taking his B blocker because of abdominal discomfort.  No other complaints.

## 2017-12-14 NOTE — H&P
[facility] Hospital Medicine  History & Physical    Patient Name: Altaf Guo  MRN: [id number]  Admission Date: 12/14/2017  Attending Physician: Braydon Peña MD   Primary Care Provider: [facility]         Patient information was obtained from spouse/SO.     Subjective:     Principal Problem:Acute pulmonary embolism    Chief Complaint:   Chief Complaint   Patient presents with    Shortness of Breath     x 2 days with swelling both legs        HPI: 51 y/o male presents with lower extremity swelling and shortness of breath.  Patient was very anxious and restless upon presentation to the ICU.  He was given Ativan and currently very sleepy.  History is obtained from patient's spouse.  Patient started complaining of bilateral lower extremity swelling last week.  He also started complaining of non productive cough.  Wife stating that he also looked short of breath.  Patient did not want to seek medical attention.  Dyspnea greatly worsened yesterday.  Wife insisted on patient coming to ER this morning.  No fever or chills.  Patient has hx of DVT/PE, but stopped taking Xarelto several months ago because he could not afford it.  He also has hx of AFib.  Patient was noted to be tachycardic in ER.  He was placed on Diltiazem gtt and admitted to ICU.  He also apparently stopped taking his B blocker because of abdominal discomfort.  No other complaints.    Past Medical History:   Diagnosis Date    Alcoholic hepatitis 3/27/2013    Anticoagulant long-term use     Atrial fibrillation 6/7/2016    CHF (congestive heart failure)     DVT (deep venous thrombosis)     Embolic stroke involving right middle cerebral artery 6/6/2016    Embolic stroke involving right middle cerebral artery 6/6/2016    Enlarged LA (left atrium) 6/7/2016    Hypertension     Persistent atrial fibrillation 8/9/2014    Pulmonary embolism        No past surgical history on file.    Review of patient's allergies  indicates:  No Known Allergies    No current facility-administered medications on file prior to encounter.      Current Outpatient Prescriptions on File Prior to Encounter   Medication Sig    acetaminophen (TYLENOL) 325 MG tablet Take 2 tablets (650 mg total) by mouth every 6 (six) hours as needed for Temperature greater than (100.4).    amlodipine (NORVASC) 10 MG tablet Take 10 mg by mouth once daily.    clopidogrel (PLAVIX) 75 mg tablet Take 75 mg by mouth once daily.    folic acid (FOLVITE) 1 MG tablet Take 1 tablet (1 mg total) by mouth once daily.    FUROSEMIDE (LASIX ORAL) Take 40 mg by mouth once daily.     metoprolol tartrate (LOPRESSOR) 100 MG tablet Take 1 tablet (100 mg total) by mouth 2 (two) times daily.    atorvastatin (LIPITOR) 40 MG tablet Take 1 tablet (40 mg total) by mouth once daily.    nicotine (NICODERM CQ) 21 mg/24 hr Place 1 patch onto the skin once daily.    [DISCONTINUED] rivaroxaban (XARELTO) 20 mg Tab Take 1 tablet (20 mg total) by mouth daily with dinner or evening meal.    [DISCONTINUED] thiamine 100 MG tablet Take 1 tablet (100 mg total) by mouth once daily.     Family History     Problem Relation (Age of Onset)    Arthritis Mother, Maternal Grandmother    Diabetes Maternal Grandmother, Maternal Grandfather    Kidney disease Mother        Social History Main Topics    Smoking status: Current Every Day Smoker     Packs/day: 0.50     Years: 26.00     Types: Cigarettes    Smokeless tobacco: Never Used    Alcohol use 1.8 oz/week     3 Shots of liquor per week      Comment: states quit 1 week ago    Drug use: Yes     Types: Marijuana    Sexual activity: Yes     Partners: Female     Review of Systems   Unable to perform ROS: Other (Lethargy)     Objective:     Vital Signs (Most Recent):  Temp: 96.5 °F (35.8 °C) (12/14/17 1501)  Pulse: 79 (12/14/17 1530)  Resp: 14 (12/14/17 1530)  BP: (!) 82/52 (12/14/17 1530)  SpO2: 96 % (12/14/17 1530) Vital Signs (24h Range):  Temp:  [96.5  °F (35.8 °C)-98.3 °F (36.8 °C)] 96.5 °F (35.8 °C)  Pulse:  [] 79  Resp:  [14-48] 14  SpO2:  [79 %-100 %] 96 %  BP: ()/() 82/52     Weight: 115.7 kg (255 lb 1.2 oz)  Body mass index is 32.75 kg/m².    Physical Exam   Constitutional: He appears well-developed. No distress.   HENT:   Head: Normocephalic and atraumatic.   Eyes: Conjunctivae are normal. Right eye exhibits no discharge. Left eye exhibits no discharge.   Neck: Neck supple.   Cardiovascular: Normal heart sounds.    Irregularly irregular   Pulmonary/Chest: Effort normal and breath sounds normal. No stridor.   Abdominal: Soft. Bowel sounds are normal.   Musculoskeletal: He exhibits no deformity.   Neurological:   Lethargic  Awakes to verbal stimuli, but falls back asleep.   Skin: Skin is warm and dry.           Significant Labs:   BMP:   Recent Labs  Lab 12/14/17  0740   *      K 3.8   CL 96   CO2 28   BUN 21*   CREATININE 1.4   CALCIUM 9.2     CBC:   Recent Labs  Lab 12/14/17  0740   WBC 11.47   HGB 15.8   HCT 46.8          Significant Imaging: I have reviewed all pertinent imaging results/findings within the past 24 hours.    Assessment/Plan:     * Acute pulmonary embolism    Hx of previous DVT/PE.  Stopped taking Xarelto months ago because he was unable to afford it.  Will probably need lifelong anticoagulation.  Coumadin therapy would be most economical option.  Will place on therapeutic Lovenox and start Coumadin tomorrow.  SW consult.          Atrial fibrillation    Unsure if paroxysmal or chronic.  Presented with rapid AFib.  Initially placed on Diltiazem gtt.  Cardiology consulted and he was switched to Amiodarone gtt.  HR much improved.  Tachycardia also probably exacerbated by PE.          Elevated LFTs    Secondary to ETOH use and also possible passive congestion secondary to significant CHF.        History of medication noncompliance              Chronic combined systolic and diastolic heart failure    EF of  10-15% with marked diastolic dysfunction.  No evidence of acute exacerbation.  Will follow Cards recommendations.          Type 2 diabetes mellitus, controlled    Place on diabetic diet and insulin sliding scale.        Essential hypertension    Patient currently on the hypotensive side.  Will hold home medications and monitor.          Elevated troponin    Probably secondary to rapid Afib and significant CHF.  No chest pain.  Cards consulted.          Alcoholism    Apparently recently quit.  He was very anxious upon presentation to ICU.  DT prophylaxis with scheduled Valium.  Prn Ativan.   Also give MVI, Thiamine and Folic Acid.          VTE Risk Mitigation         Ordered     enoxaparin injection 120 mg  Every 12 hours     Route:  Subcutaneous        12/14/17 1710     Medium Risk of VTE  Once      12/14/17 1050     Place HENRIQUE hose  Until discontinued      12/14/17 1050     Place sequential compression device  Until discontinued      12/14/17 1050        Critical care time spent on the evaluation and treatment of severe organ dysfunction, review of pertinent labs and imaging studies, discussions with consulting providers and discussions with patient/family: 60 minutes.     Braydon Peña MD  Department of Hospital Medicine   [facility]

## 2017-12-14 NOTE — ED TRIAGE NOTES
Increased fluid in legs x 1 week.  Felt sob this am with ambulation.  Felt my heart beating irregular.

## 2017-12-14 NOTE — ASSESSMENT & PLAN NOTE
EF of 10-15% with marked diastolic dysfunction.  No evidence of acute exacerbation.  Will follow Cards recommendations.

## 2017-12-14 NOTE — ED PROVIDER NOTES
"Encounter Date: 12/14/2017    SCRIBE #1 NOTE: I, Jaqueline Marquez, am scribing for, and in the presence of, Juan Manuel Oconnor MD. Other sections scribed: HPI/ROS.       History     Chief Complaint   Patient presents with    Shortness of Breath     x 2 days with swelling both legs     CC: Shortness of breath    Pt is a 52 y.o. male smoker (0.5 ppd) w/ a-fib, enlarged L atrium, HTN, alcoholic hepatitis, and hx of long-term anticoagulant use 2nd/to DVT, PE, and emobolic stroke, presenting to the ED c/o constant leg swelling (including R thigh) and worsening SOB on exertion x 1 week, as well as cough x 2 weeks. Pt states he has not taken Metoprolol in "a few days" because it "makes my stomach feel full and tight, really bloated."     Pt states he cannot lay flat on his back 2nd/to SOB, but that has been the case for his whole life. Pt denies chest pain or any other pain. Pt reports no further symptoms. No prior attempted treatment.        The history is provided by the patient.     Review of patient's allergies indicates:  No Known Allergies  Past Medical History:   Diagnosis Date    Alcoholic hepatitis 3/27/2013    Anticoagulant long-term use     Atrial fibrillation 6/7/2016    CHF (congestive heart failure)     DVT (deep venous thrombosis)     Embolic stroke involving right middle cerebral artery 6/6/2016    Embolic stroke involving right middle cerebral artery 6/6/2016    Enlarged LA (left atrium) 6/7/2016    Hypertension     Persistent atrial fibrillation 8/9/2014    Pulmonary embolism      History reviewed. No pertinent surgical history.  Family History   Problem Relation Age of Onset    Arthritis Mother     Kidney disease Mother     Arthritis Maternal Grandmother     Diabetes Maternal Grandmother     Diabetes Maternal Grandfather      Social History   Substance Use Topics    Smoking status: Current Every Day Smoker     Packs/day: 0.50     Years: 26.00     Types: Cigarettes    Smokeless tobacco: " Never Used    Alcohol use 1.8 oz/week     3 Shots of liquor per week      Comment: states quit 1 week ago     Review of Systems   Constitutional: Negative for fever.   HENT: Negative for ear pain and sore throat.    Eyes: Negative for pain.   Respiratory: Positive for cough and shortness of breath.    Cardiovascular: Positive for leg swelling.   Gastrointestinal: Negative for abdominal pain, nausea and vomiting.   Genitourinary: Negative for flank pain.   Musculoskeletal: Negative for back pain and neck pain.   Skin: Negative for rash.   Neurological: Negative for headaches.       Physical Exam     Initial Vitals [12/14/17 0728]   BP Pulse Resp Temp SpO2   (!) 133/100 (!) 147 18 98.2 °F (36.8 °C) 98 %      MAP       111         Physical Exam    Nursing note and vitals reviewed.  Constitutional: Vital signs are normal. He appears well-developed and well-nourished. He is active.  Non-toxic appearance. No distress.   HENT:   Head: Normocephalic and atraumatic.   Eyes: EOM are normal.   Neck: Trachea normal. Neck supple.   Cardiovascular: An irregularly irregular rhythm present. Tachycardia present.    Pulmonary/Chest: No respiratory distress.   Crackles to the L lung base.   Abdominal: Soft. Normal appearance and bowel sounds are normal. He exhibits no distension. There is no tenderness.   Musculoskeletal: Normal range of motion. He exhibits no edema.   2+ pitting edema to BLE.   Neurological: He is alert.   Skin: Skin is warm, dry and intact.   Psychiatric: He has a normal mood and affect.         ED Course   Procedures  Labs Reviewed   CBC W/ AUTO DIFFERENTIAL - Abnormal; Notable for the following:        Result Value    Gran # 8.4 (*)     Mono # 1.4 (*)     Gran% 73.3 (*)     Lymph% 13.5 (*)     All other components within normal limits   COMPREHENSIVE METABOLIC PANEL - Abnormal; Notable for the following:     Glucose 150 (*)     BUN, Bld 21 (*)     Albumin 2.8 (*)     Alkaline Phosphatase 137 (*)     AST 93 (*)      ALT 98 (*)     eGFR if non  58 (*)     All other components within normal limits   TROPONIN I - Abnormal; Notable for the following:     Troponin I 0.668 (*)     All other components within normal limits   B-TYPE NATRIURETIC PEPTIDE - Abnormal; Notable for the following:      (*)     All other components within normal limits   D DIMER, QUANTITATIVE - Abnormal; Notable for the following:     D-Dimer 2.70 (*)     All other components within normal limits             Medical Decision Making:   Initial Assessment:   52-year-old male presenting with tachycardia, fatigue, orthopnea, and dyspnea with exertion.  Has a history of CHF with an EF of around 25%, though other prior notes have stated as low as 10%.  Exam reveals mild crackles in the left lung base as well as 2+ symmetric pitting edema in his lower extremities.  Else note he has a history of DVT and PE.  Currently not on Xeralto.  Patient notes he that has not taken much of his medication and has been noncompliant with many of them, most prominently noncompliant with his metoprolol.  On arrival, heart rate was as high as 180.  Patient was given metoprolol IV push ×2 with heart rate decreasing to the 140s.  Of note,'s patient has a significantly elevated d-dimer.  Given a history of blood clots, I elected to scan him for Pe.  The patient is relatively asymptomatic with adequate blood pressure, no chest pain, no respiratory distress, and mentating appropriately.  Initial troponin is elevated at 0.6, unclear whether this is due to strain.  No STEMI noted on EKG, though rapid heart rate makes evaluation somewhat difficult.  Denies chest pain, shortness of breath, diaphoresis, or nausea.  Patient denies any significant cough, fevers, malaise, urinary complaints, or other symptoms making me concerned for infection.   Differential Diagnosis:   Sepsis, afib w/ RVR, PE CHF, ACS, PNA  ED Management:  Patient very tachycardic but relatively  asymptomatic. Intially given metop x 3 given hx of being on beta blocker and non-compliant. Inadequate response. Started on dilt drip. BP okay. Vitals stable. Admitted to ICU for further evaluation and management. Trop elevated, ASA given. Could be due to strain from tachycardia. CTA shows Pe. Patient to ICU for further evaluation and management. Of note, the patient seems to have very poor insight into his chronic medical issues and the seriousness of his condition - requesting to go home, which I counseled him would be very possibly fatal.            Scribe Attestation:   Scribe #1: I performed the above scribed service and the documentation accurately describes the services I performed. I attest to the accuracy of the note.    Attending Attestation:           Physician Attestation for Scribe:  Physician Attestation Statement for Scribe #1: I, Juan Manuel Oconnor MD, reviewed documentation, as scribed by Jaqueline Marquez in my presence, and it is both accurate and complete.                 ED Course      Clinical Impression:   The primary encounter diagnosis was Tachycardia. Diagnoses of Chest pain, Other acute pulmonary embolism without acute cor pulmonale, Atrial flutter, Acute on chronic combined systolic and diastolic heart failure, Acute systolic heart failure, Elevated troponin, Cardiogenic shock, Alcoholism, Acute renal failure with acute cortical necrosis, Paroxysmal atrial fibrillation, Elevated LFTs, Essential hypertension, and History of medication noncompliance were also pertinent to this visit.    Disposition:   Disposition: Admitted  Condition: Critical                        Juan Manuel Oconnor MD  12/18/17 [id number]

## 2017-12-15 PROBLEM — R57.0 CARDIOGENIC SHOCK: Status: ACTIVE | Noted: 2017-01-01

## 2017-12-15 PROBLEM — N17.9 ARF (ACUTE RENAL FAILURE): Status: ACTIVE | Noted: 2017-01-01

## 2017-12-15 NOTE — PROGRESS NOTES
Pre-sedation Assessment:    1. ASA Score: ASA 3 - Patient with moderate systemic disease with functional limitations  2. Mallampati Class: III (soft and hard palate and base of uvula visible)  3. Patient or family history of any reaction to anesthesia or sedation: No  4. Plan for Sedation: Local  5. H&P within 30 days of the procedure and updated within 24 hrs of admission or registration: Yes

## 2017-12-15 NOTE — PROGRESS NOTES
[facility]  Cardiology  Progress Note    Patient Name: Altaf Guo  MRN: [id number]  Admission Date: 12/14/2017  Hospital Length of Stay: 1 days  Code Status: Full Code   Attending Physician: Braydon Peña MD   Primary Care Physician: [facility]  Expected Discharge Date:   Principal Problem:Acute pulmonary embolism    Subjective:     Interval Hx: pt seen in ICU, case d/w RN.  He denies cp/sob, but seems lethargic.  Creat noted to double overnight after CTA chest 12/14/17.    Tele: AFL, VR 80s (pers rev)        Review of Systems   Gastrointestinal: Negative for melena.   Genitourinary: Negative for hematuria.     Objective:     Vital Signs (Most Recent):  Temp: 97.8 °F (36.6 °C) (12/15/17 0315)  Pulse: 100 (12/15/17 0600)  Resp: (!) 34 (12/15/17 0600)  BP: 107/68 (12/15/17 0554)  SpO2: 98 % (12/15/17 0743) Vital Signs (24h Range):  Temp:  [96.5 °F (35.8 °C)-98.3 °F (36.8 °C)] 97.8 °F (36.6 °C)  Pulse:  [] 100  Resp:  [13-48] 34  SpO2:  [64 %-100 %] 98 %  BP: ()/(37-94) 107/68     Weight: 115.7 kg (255 lb 1.2 oz)  Body mass index is 32.75 kg/m².     SpO2: 98 %  O2 Device (Oxygen Therapy): nasal cannula      Intake/Output Summary (Last 24 hours) at 12/15/17 0824  Last data filed at 12/15/17 0605   Gross per 24 hour   Intake          1072.19 ml   Output              600 ml   Net           472.19 ml       Lines/Drains/Airways     Peripheral Intravenous Line                 Peripheral IV - Single Lumen 12/14/17 0749 Right Antecubital 1 day         Peripheral IV - Single Lumen 12/14/17 0952 Right Forearm less than 1 day                Physical Exam   Constitutional: He is oriented to person, place, and time. He appears well-developed and well-nourished.   HENT:   Head: Normocephalic and atraumatic.   Eyes: Conjunctivae and EOM are normal. Pupils are equal, round, and reactive to light. No scleral icterus.   Neck: Neck supple. No JVD present. Carotid bruit is not present.  No tracheal deviation present. No thyromegaly present.   Cardiovascular: Normal rate, S1 normal and S2 normal.  An irregular rhythm present. Exam reveals no gallop and no friction rub.    No murmur heard.  Pulmonary/Chest: Effort normal and breath sounds normal. He has no wheezes. He exhibits no tenderness.   Abdominal: Soft. Bowel sounds are normal. He exhibits no distension and no mass. There is no tenderness. There is no rebound and no guarding.   Musculoskeletal: He exhibits edema.   Neurological: He is alert and oriented to person, place, and time. He has normal strength. No cranial nerve deficit.   Skin: Skin is warm and dry. No rash noted.   Psychiatric: He has a normal mood and affect. His behavior is normal.       Current Medications:   lidocaine HCL 10 mg/ml (1%)        atorvastatin  40 mg Oral Daily    clopidogrel  75 mg Oral Daily    diazePAM  5 mg Oral Q8H    enoxaparin  1 mg/kg Subcutaneous Q12H    folic acid  1 mg Oral Daily    metoprolol tartrate  100 mg Oral BID    multivitamin  1 tablet Oral Daily    senna-docusate 8.6-50 mg  1 tablet Oral BID    sodium chloride 0.9%  500 mL Intravenous Once    sodium chloride 0.9%  3 mL Intravenous Q8H    thiamine  100 mg Oral Daily      amiodarone 0.5 mg/min (12/15/17 0605)    dilTIAZem Stopped (12/14/17 1315)    phenylephrine 1 mcg/kg/min (12/15/17 0730)     acetaminophen, dextrose 50%, dextrose 50%, glucagon (human recombinant), glucose, glucose, insulin aspart, lorazepam, morphine, ondansetron, ondansetron, oxyCODONE    Laboratory:  CBC:    Recent Labs  Lab 03/08/17  0632 12/14/17  0740 12/15/17  0146   WHITE BLOOD CELL COUNT 6.94 11.47 14.01 H   HEMOGLOBIN 14.8 15.8 14.7   HEMATOCRIT 41.7 46.8 44.9   PLATELETS 159 291 204       CHEMISTRIES:    Recent Labs  Lab 06/10/16  0540 03/07/17  0457 03/08/17  0632 12/14/17  0740 12/15/17  0146   GLUCOSE 134 H 174 H 170 H 150 H 173 H   SODIUM 137 140 137 137 134 L   POTASSIUM 4.2 3.5 3.7 3.8 5.0   BUN BLD  8 15 13 21 H 26 H   CREATININE 0.8 1.2 0.9 1.4 2.8 H   EGFR IF  >60.0 >60 >60 >60 29 A   EGFR IF NON- >60.0 >60 >60 58 A 25 A   CALCIUM 8.7 9.3 8.5 L 9.2 8.2 L   MAGNESIUM 1.7 1.7 1.7  --   --        CARDIAC BIOMARKERS:    Recent Labs  Lab 12/14/17  0740 12/14/17  1020 12/14/17  2309   TROPONIN I 0.668 H 0.628 H 0.561 H       COAGS:    Recent Labs  Lab 06/09/16  0400 06/10/16  0540 03/07/17  0457   INR 1.1 1.0 1.0       LIPIDS/LFTS:    Recent Labs  Lab 06/07/16  0958 06/07/16  1433  03/07/17  0457 03/08/17  0632 12/14/17  0740   CHOLESTEROL 152 139  --   --   --   --    TRIGLYCERIDES 159 H 182 H  --   --   --   --    HDL 42 37 L  --   --   --   --    LDL CHOLESTEROL 78.2 65.6  --   --   --   --    NON-HDL CHOLESTEROL 110 102  --   --   --   --    AST  --   --   < > 32 16 93 H   ALT  --   --   < > 22 15 98 H   < > = values in this interval not displayed.    Lab Results   Component Value Date    TSH 1.553 06/07/2016         Diagnostic Results:  ECG (personally reviewed tracings):   12/14/17 1017     Chest X-Ray (personally reviewed image(s)): 12/14/17 cmeg, NAD    CTA Chest 12/14/17  Findings suspicious for small emboli within the basal segmental branches of the right lower lobe, noting evaluation is limited by patient respiratory motion artifact. Clinical correlation advised. No large central pulmonary embolus is apparent.  Focal consolidation in the right lung base. Differential considerations include pneumonia, aspiration, or pulmonary infarction.  Cardiomegaly with small pericardial effusion. Small right pleural effusion.  Mild diffuse bronchial wall thickening.  Nodular thickening of the adrenal glands.   Small amount of ascites over the spleen.    Echo: 12/14/17    1 - Severely depressed left ventricular systolic function (EF 10-15%).     2 - Concentric hypertrophy.     3 - Mild left ventricular enlargement.     4 - Biatrial enlargement.     5 - Restrictive LV filling  pattern, indicating markedly elevated LAP (grade 3 diastolic dysfunction).     6 - Right ventricular enlargement with moderately to severely depressed systolic function.     7 - The estimated PA systolic pressure is 37 mmHg.     8 - Mild mitral regurgitation.     9 - Moderate tricuspid regurgitation.     10 - Mild pulmonic regurgitation.     11 - Small pericardial effusion.       Assessment and Plan:     * Acute pulmonary embolism    Currently on enox 1mg/kg, will change to qd dosing given rise in creat.  Size of PE seems small to be causing his hypotension, more likely d/t acute CHF and possible loss of atrial kick.        Atrial fibrillation    Will plan LORETTA/DCCV today in hopes of restoring atrial kick and improving hemodynamics.  Cont amio gtt  Lovenox changed to 1mg/kg q24 hrs with rise in creat  Will need eventual coumadin (pt unable to afford NOAC).        Chronic combined systolic and diastolic heart failure    Diuresis and afterload reduction as tolerated.  Will change edison to dopamine.  Eventual cath will be needed to assess for cause of CMP (pt reports no prior cath).  Will hold off until creat normalizes.  Pt's creat doubled overnight, will ask neph to eval.        Elevated troponin    Likely cath when renal fxn and hemodynamics stabilize.        Type 2 diabetes mellitus, controlled    Per IM          VTE Risk Mitigation         Ordered     enoxaparin injection 120 mg  Every 24 hours (non-standard times)     Route:  Subcutaneous        12/15/17 0838     Medium Risk of VTE  Once      12/14/17 1050     Place HENRIQUE hose  Until discontinued      12/14/17 1050     Place sequential compression device  Until discontinued      12/14/17 1050        Critical care time 35min    Isaac Hayes MD  Cardiology  [facility]    Addendum 1234pm:  Anesthesia unable to perform sedation with pt's low BP, therefore unable to do LORETTA/DCCV.  I will discuss case with transfer ctr to see if we can get pt to  [facility] for higher level of care +/- LV support.    Addendum 1pm:  Case d/w Dr. Calderon at [facility].  Suggests RHC and IABP with subsequent transfer to [facility].

## 2017-12-15 NOTE — CONSULTS
Consulted for CHF nutrition Education. Patient transferred to ICU. Attempted visit x2 but in procedure both times. Will f/u when appropriate.

## 2017-12-15 NOTE — CONSULTS
"52 y o AAM with hx of CHF ef 10, Afib, CVA, DVT, htn, tabacism and etoh user who was admitted for dyspnea x 2 days dx as having a PE. Renal input requested because anuria and rising creat.    His evaluation included CTA which exposed the patient to a small to mod ammount of Dye. He has also had acei and metformin.  Denies dysuria hematuria or frequency. Denies uti or K stones.    Pt has no hx of urinary problems, halie or ckd. Has had exposure to "BC's" last time 2 weeks ago or so.   Past Medical History:   Diagnosis Date    Alcoholic hepatitis 3/27/2013    Anticoagulant long-term use     Atrial fibrillation 6/7/2016    CHF (congestive heart failure)     DVT (deep venous thrombosis)     Embolic stroke involving right middle cerebral artery 6/6/2016    Embolic stroke involving right middle cerebral artery 6/6/2016    Enlarged LA (left atrium) 6/7/2016    Hypertension     Persistent atrial fibrillation 8/9/2014    Pulmonary embolism      Review of patient's allergies indicates:  No Known Allergies    Current Facility-Administered Medications   Medication    acetaminophen tablet 650 mg    amiodarone 360 mg/200 mL (1.8 mg/mL) infusion    atorvastatin tablet 40 mg    clopidogrel tablet 75 mg    dextrose 50% injection 12.5 g    dextrose 50% injection 25 g    diazePAM tablet 5 mg    DOPamine 400 mg in dextrose 5 % 250 mL infusion (premix)    enoxaparin injection 120 mg    folic acid tablet 1 mg    glucagon (human recombinant) injection 1 mg    glucose chewable tablet 16 g    glucose chewable tablet 24 g    insulin aspart pen 1-10 Units    lidocaine HCL 10 mg/ml (1%) 10 mg/mL (1 %) injection    lorazepam injection 1 mg    morphine injection 4 mg    multivitamin tablet 1 tablet    ondansetron injection 4 mg    ondansetron injection 4 mg    oxyCODONE immediate release tablet 5 mg    senna-docusate 8.6-50 mg per tablet 1 tablet    sodium chloride 0.9% flush 3 mL    thiamine tablet 100 mg "       LABS    Recent Results (from the past 24 hour(s))   2D echo with color flow doppler    Collection Time: 12/14/17  1:15 PM   Result Value Ref Range    EF 10 (A) 55 - 65    Mitral Valve Regurgitation MILD     Diastolic Dysfunction Yes (A)     Est. PA Systolic Pressure 37.09     Pericardial Effusion SMALL (A)     Tricuspid Valve Regurgitation MODERATE (A)    ISTAT PROCEDURE    Collection Time: 12/14/17  6:05 PM   Result Value Ref Range    POC PH 7.330 (L) 7.35 - 7.45    POC PCO2 27.3 (LL) 35 - 45 mmHg    POC PO2 142 (H) 80 - 100 mmHg    POC HCO3 14.4 (L) 24 - 28 mmol/L    POC BE -10 -2 to 2 mmol/L    POC SATURATED O2 99 95 - 100 %    POC TCO2 15 (L) 23 - 27 mmol/L    Sample ARTERIAL     Site RR     Allens Test Pass     DelSys Venti Mask    Troponin I    Collection Time: 12/14/17 11:09 PM   Result Value Ref Range    Troponin I 0.561 (H) 0.000 - 0.026 ng/mL   POCT glucose    Collection Time: 12/15/17 12:46 AM   Result Value Ref Range    POCT Glucose 45 (LL) 70 - 110 mg/dL   POCT glucose    Collection Time: 12/15/17  1:27 AM   Result Value Ref Range    POCT Glucose 220 (H) 70 - 110 mg/dL   CBC auto differential    Collection Time: 12/15/17  1:46 AM   Result Value Ref Range    WBC 14.01 (H) 3.90 - 12.70 K/uL    RBC 4.83 4.60 - 6.20 M/uL    Hemoglobin 14.7 14.0 - 18.0 g/dL    Hematocrit 44.9 40.0 - 54.0 %    MCV 93 82 - 98 fL    MCH 30.4 27.0 - 31.0 pg    MCHC 32.7 32.0 - 36.0 g/dL    RDW 13.8 11.5 - 14.5 %    Platelets 204 150 - 350 K/uL    MPV 11.0 9.2 - 12.9 fL    Gran # 11.8 (H) 1.8 - 7.7 K/uL    Lymph # 1.1 1.0 - 4.8 K/uL    Mono # 1.1 (H) 0.3 - 1.0 K/uL    Eos # 0.0 0.0 - 0.5 K/uL    Baso # 0.02 0.00 - 0.20 K/uL    Gran% 84.6 (H) 38.0 - 73.0 %    Lymph% 7.7 (L) 18.0 - 48.0 %    Mono% 7.6 4.0 - 15.0 %    Eosinophil% 0.0 0.0 - 8.0 %    Basophil% 0.1 0.0 - 1.9 %    Differential Method Automated    Basic metabolic panel    Collection Time: 12/15/17  1:46 AM   Result Value Ref Range    Sodium 134 (L) 136 - 145 mmol/L     Potassium 5.0 3.5 - 5.1 mmol/L    Chloride 91 (L) 95 - 110 mmol/L    CO2 18 (L) 23 - 29 mmol/L    Glucose 173 (H) 70 - 110 mg/dL    BUN, Bld 26 (H) 6 - 20 mg/dL    Creatinine 2.8 (H) 0.5 - 1.4 mg/dL    Calcium 8.2 (L) 8.7 - 10.5 mg/dL    Anion Gap 25 (H) 8 - 16 mmol/L    eGFR if African American 29 (A) >60 mL/min/1.73 m^2    eGFR if non African American 25 (A) >60 mL/min/1.73 m^2   POCT glucose    Collection Time: 12/15/17  4:24 AM   Result Value Ref Range    POCT Glucose 132 (H) 70 - 110 mg/dL   ISTAT PROCEDURE    Collection Time: 12/15/17  9:24 AM   Result Value Ref Range    POC PH 7.307 (L) 7.35 - 7.45    POC PCO2 40.6 35 - 45 mmHg    POC PO2 74 (L) 80 - 100 mmHg    POC HCO3 20.3 (L) 24 - 28 mmol/L    POC BE -6 -2 to 2 mmol/L    POC SATURATED O2 93 (L) 95 - 100 %    POC TCO2 21 (L) 23 - 27 mmol/L    Sample ARTERIAL     Site Katharine/UAC     Allens Test N/A     DelSys Nasal Can     Mode SPONT     Flow 4     Sp02 88    POCT glucose    Collection Time: 12/15/17  9:26 AM   Result Value Ref Range    POCT Glucose 98 70 - 110 mg/dL   ABG's  74/30/7.30 on 4 lt nc  I/O last 3 completed shifts:  In: 1072.2 [P.O.:480; I.V.:492.2; IV Piggyback:100]  Out: 600 [Urine:600]    Vitals:    12/15/17 0845 12/15/17 0900 12/15/17 0915 12/15/17 0930   BP:  (!) 85/53     Pulse: 91 100 97 103   Resp: (!) 28 (!) 25 (!) 29 (!) 28   Temp:       TempSrc:       SpO2: 96% 98% (!) 92% (!) 89%   Weight:       Height:           No Jvd, Thyromegaly or Lymphadenopathy  Lungs: Fairly clear anteriorly and laterally  Cor: RRR no G or rubs  Abd: Soft benign good bowel sounds non tender  Ext: No E C C    A)  Oligo anuria secondary to hypotension  GULSHAN with rising creat from 1.4 to 2.8 secondary to above plus dye ( LILIANA )  Hypotension  CAD  AFIB  PE/DVT  DM  Proteinuria  Metabolic and resp acidosis  Hyponatremia   Acute protein malnutrition      Recom:    Fluid bolus one more time  consider levophed/dobutrex  Avoid nephrotoxic meds  Maintain  hydration  Poor candidate for HD/CRRT  Consider Palliative care  Check lactic acid

## 2017-12-15 NOTE — SUBJECTIVE & OBJECTIVE
Review of Systems   Gastrointestinal: Negative for melena.   Genitourinary: Negative for hematuria.     Objective:     Vital Signs (Most Recent):  Temp: 97.8 °F (36.6 °C) (12/15/17 0315)  Pulse: 100 (12/15/17 0600)  Resp: (!) 34 (12/15/17 0600)  BP: 107/68 (12/15/17 0554)  SpO2: 98 % (12/15/17 0743) Vital Signs (24h Range):  Temp:  [96.5 °F (35.8 °C)-98.3 °F (36.8 °C)] 97.8 °F (36.6 °C)  Pulse:  [] 100  Resp:  [13-48] 34  SpO2:  [64 %-100 %] 98 %  BP: ()/(37-94) 107/68     Weight: 115.7 kg (255 lb 1.2 oz)  Body mass index is 32.75 kg/m².     SpO2: 98 %  O2 Device (Oxygen Therapy): nasal cannula      Intake/Output Summary (Last 24 hours) at 12/15/17 0824  Last data filed at 12/15/17 0605   Gross per 24 hour   Intake          1072.19 ml   Output              600 ml   Net           472.19 ml       Lines/Drains/Airways     Peripheral Intravenous Line                 Peripheral IV - Single Lumen 12/14/17 0749 Right Antecubital 1 day         Peripheral IV - Single Lumen 12/14/17 0952 Right Forearm less than 1 day                Physical Exam   Constitutional: He is oriented to person, place, and time. He appears well-developed and well-nourished.   HENT:   Head: Normocephalic and atraumatic.   Eyes: Conjunctivae and EOM are normal. Pupils are equal, round, and reactive to light. No scleral icterus.   Neck: Neck supple. No JVD present. Carotid bruit is not present. No tracheal deviation present. No thyromegaly present.   Cardiovascular: Normal rate, S1 normal and S2 normal.  An irregular rhythm present. Exam reveals no gallop and no friction rub.    No murmur heard.  Pulmonary/Chest: Effort normal and breath sounds normal. He has no wheezes. He exhibits no tenderness.   Abdominal: Soft. Bowel sounds are normal. He exhibits no distension and no mass. There is no tenderness. There is no rebound and no guarding.   Musculoskeletal: He exhibits edema.   Neurological: He is alert and oriented to person, place, and  time. He has normal strength. No cranial nerve deficit.   Skin: Skin is warm and dry. No rash noted.   Psychiatric: He has a normal mood and affect. His behavior is normal.       Current Medications:   lidocaine HCL 10 mg/ml (1%)        atorvastatin  40 mg Oral Daily    clopidogrel  75 mg Oral Daily    diazePAM  5 mg Oral Q8H    enoxaparin  1 mg/kg Subcutaneous Q12H    folic acid  1 mg Oral Daily    metoprolol tartrate  100 mg Oral BID    multivitamin  1 tablet Oral Daily    senna-docusate 8.6-50 mg  1 tablet Oral BID    sodium chloride 0.9%  500 mL Intravenous Once    sodium chloride 0.9%  3 mL Intravenous Q8H    thiamine  100 mg Oral Daily      amiodarone 0.5 mg/min (12/15/17 0605)    dilTIAZem Stopped (12/14/17 1315)    phenylephrine 1 mcg/kg/min (12/15/17 0730)     acetaminophen, dextrose 50%, dextrose 50%, glucagon (human recombinant), glucose, glucose, insulin aspart, lorazepam, morphine, ondansetron, ondansetron, oxyCODONE    Laboratory:  CBC:    Recent Labs  Lab 03/08/17  0632 12/14/17  0740 12/15/17  0146   WHITE BLOOD CELL COUNT 6.94 11.47 14.01 H   HEMOGLOBIN 14.8 15.8 14.7   HEMATOCRIT 41.7 46.8 44.9   PLATELETS 159 291 204       CHEMISTRIES:    Recent Labs  Lab 06/10/16  0540 03/07/17  0457 03/08/17  0632 12/14/17  0740 12/15/17  0146   GLUCOSE 134 H 174 H 170 H 150 H 173 H   SODIUM 137 140 137 137 134 L   POTASSIUM 4.2 3.5 3.7 3.8 5.0   BUN BLD 8 15 13 21 H 26 H   CREATININE 0.8 1.2 0.9 1.4 2.8 H   EGFR IF  >60.0 >60 >60 >60 29 A   EGFR IF NON- >60.0 >60 >60 58 A 25 A   CALCIUM 8.7 9.3 8.5 L 9.2 8.2 L   MAGNESIUM 1.7 1.7 1.7  --   --        CARDIAC BIOMARKERS:    Recent Labs  Lab 12/14/17  0740 12/14/17  1020 12/14/17  2309   TROPONIN I 0.668 H 0.628 H 0.561 H       COAGS:    Recent Labs  Lab 06/09/16  0400 06/10/16  0540 03/07/17  0457   INR 1.1 1.0 1.0       LIPIDS/LFTS:    Recent Labs  Lab 06/07/16  0958 06/07/16  1433  03/07/17  0457 03/08/17  0632  12/14/17  0740   CHOLESTEROL 152 139  --   --   --   --    TRIGLYCERIDES 159 H 182 H  --   --   --   --    HDL 42 37 L  --   --   --   --    LDL CHOLESTEROL 78.2 65.6  --   --   --   --    NON-HDL CHOLESTEROL 110 102  --   --   --   --    AST  --   --   < > 32 16 93 H   ALT  --   --   < > 22 15 98 H   < > = values in this interval not displayed.    Lab Results   Component Value Date    TSH 1.553 06/07/2016         Diagnostic Results:  ECG (personally reviewed tracings):   12/14/17 1017     Chest X-Ray (personally reviewed image(s)): 12/14/17 cmeg, NAD    CTA Chest 12/14/17  Findings suspicious for small emboli within the basal segmental branches of the right lower lobe, noting evaluation is limited by patient respiratory motion artifact. Clinical correlation advised. No large central pulmonary embolus is apparent.  Focal consolidation in the right lung base. Differential considerations include pneumonia, aspiration, or pulmonary infarction.  Cardiomegaly with small pericardial effusion. Small right pleural effusion.  Mild diffuse bronchial wall thickening.  Nodular thickening of the adrenal glands.   Small amount of ascites over the spleen.    Echo: 12/14/17    1 - Severely depressed left ventricular systolic function (EF 10-15%).     2 - Concentric hypertrophy.     3 - Mild left ventricular enlargement.     4 - Biatrial enlargement.     5 - Restrictive LV filling pattern, indicating markedly elevated LAP (grade 3 diastolic dysfunction).     6 - Right ventricular enlargement with moderately to severely depressed systolic function.     7 - The estimated PA systolic pressure is 37 mmHg.     8 - Mild mitral regurgitation.     9 - Moderate tricuspid regurgitation.     10 - Mild pulmonic regurgitation.     11 - Small pericardial effusion.

## 2017-12-15 NOTE — ASSESSMENT & PLAN NOTE
Will plan LORETTA/DCCV today in hopes of restoring atrial kick and improving hemodynamics.  Cont amio gtt  Lovenox changed to 1mg/kg q24 hrs with rise in creat  Will need eventual coumadin (pt unable to afford NOAC).

## 2017-12-15 NOTE — HOSPITAL COURSE
12/15/17: accepted for transfer to [facility], no beds available.  IABP and PA catheter placed.    Interval Hx: pt seen in ICU, case d/w RN, Dr. Can and transfer ctr.  He denies cp/sob.  Bed at [facility] anticipated later today.    Tele:  90s (pers rev)

## 2017-12-15 NOTE — PROGRESS NOTES
Pt with low BP 72/47. I attempted to obtain BP from multiple sites prior. Dr. Gutierrez notified with new orders received. Pt also instructed on new orders and verb understanding.

## 2017-12-15 NOTE — BRIEF OP NOTE
[facility]  Brief Operative Note     SUMMARY     Surgery Date: 12/15/2017     Surgeon(s) and Role:     * Isaac Hayes MD - Primary    Assisting Surgeon: None    Pre-op Diagnosis:  Cardiogenic shock    Post-op Diagnosis:  Post-Op Diagnosis Codes:     * Cardiogenic shock    Procedure(s) (LRB):  RHC  IABP    Anesthesia: General/MAC    Description of the findings of the procedure: RHC and IABP via RFV/A access.  Both sutured in place at conclusion of case.    Findings/Key Components:  RA 32  RV 58/12/23  PA 58/28/42  PAWP 40  CO 3.6 L/min    Hemostasis:  RFV sheath sutured in place with SGC  RFA IABP sutured in place    Impression:  Cardiogenic shock  AF/RVR, unable to perform LORETTA/DCCV (anesthesia svc uncomfortable with pt's BP).  Pulm Emb (small)    Plan:  Cont supportive care  Transfer to [facility] planned  Wean pressors and initiate GDMT as BP will allow    Estimated Blood Loss: <50cc         Specimens: None

## 2017-12-15 NOTE — ANESTHESIA PREPROCEDURE EVALUATION
12/15/2017  Altaf Guo is a 52 y.o., male.    Anesthesia Evaluation    I have reviewed the Patient Summary Reports.    I have reviewed the Nursing Notes.   I have reviewed the Medications.     Review of Systems  Anesthesia Hx:  No problems with previous Anesthesia Denies Hx of Anesthetic complications  Neg history of prior surgery. Denies Family Hx of Anesthesia complications.   Denies Personal Hx of Anesthesia complications.   Social:  Non-Smoker, No Alcohol Use    Hematology/Oncology:  Hematology Normal   Oncology Normal     EENT/Dental:EENT/Dental Normal   Cardiovascular:   Exercise tolerance: good Hypertension CHF CONCLUSIONS     1 - Severely depressed left ventricular systolic function (EF 10-15%).     2 - Concentric hypertrophy.     3 - Mild left ventricular enlargement.     4 - Biatrial enlargement.     5 - Restrictive LV filling pattern, indicating markedly elevated LAP (grade 3 diastolic dysfunction).     6 - Right ventricular enlargement with moderately to severely depressed systolic function.     7 - The estimated PA systolic pressure is 37 mmHg.     8 - Mild mitral regurgitation.     9 - Moderate tricuspid regurgitation.     10 - Mild pulmonic regurgitation.     11 - Small pericardial effusion.    Pulmonary:  Pulmonary Normal    Renal/:  Renal/ Normal     Hepatic/GI:   Liver Disease,    Musculoskeletal:  Musculoskeletal Normal    Neurological:   CVA    Endocrine:   Diabetes, type 2    Dermatological:  Skin Normal    Psych:   anxiety          Physical Exam  General:  Well nourished    Airway/Jaw/Neck:  Airway Findings: Mouth Opening: Normal General Airway Assessment: Adult  Mallampati: II  TM Distance: Normal, at least 6 cm         Dental:  DENTAL FINDINGS: Normal   Chest/Lungs:  Chest/Lungs Clear    Heart/Vascular:  Heart Findings: Normal Heart murmur: negative       Mental Status:  Mental  Status Findings:  Cooperative, Alert and Oriented         Anesthesia Plan  Type of Anesthesia, risks & benefits discussed:  Anesthesia Type:  general  Patient's Preference:   Intra-op Monitoring Plan:   Intra-op Monitoring Plan Comments:   Post Op Pain Control Plan:   Post Op Pain Control Plan Comments:   Induction:   IV  Beta Blocker:  Patient is not currently on a Beta-Blocker (No further documentation required).       Informed Consent: Patient understands risks and agrees with Anesthesia plan.  Questions answered. Anesthesia consent signed with patient.  ASA Score: 4  emergent   Day of Surgery Review of History & Physical:            Ready For Surgery From Anesthesia Perspective.

## 2017-12-15 NOTE — ASSESSMENT & PLAN NOTE
Diuresis and afterload reduction as tolerated.  Will change edison to dopamine.  Eventual cath will be needed to assess for cause of CMP (pt reports no prior cath).  Will hold off until creat normalizes.  Pt's creat doubled overnight, will ask neph to eval.

## 2017-12-15 NOTE — PLAN OF CARE
Problem: Patient Care Overview  Goal: Plan of Care Review  Outcome: Ongoing (interventions implemented as appropriate)  Patient remains in the ICU in Afib with a controlled HR of 75-95. Patient is on 6L NC at this time; patient has been able to maintain his oxygen saturation of 95. Patient has Amiodarone infusing at 0.5mg/min. Patient had neosynephrine started and maintained at 0.5mcg/kg/min throughout the night to maintain a MAP >60. Patient has remained safe and free from injury. Patient has no skin breakdown. Patient is aaox4.

## 2017-12-15 NOTE — PLAN OF CARE
Problem: Patient Care Overview  Goal: Plan of Care Review  Outcome: Ongoing (interventions implemented as appropriate)  Pt remains in A-Fib but rate controlled with HR 70's-90's. Pt with O2 at 4l/nc at this time even though pt requested VM earlier. Pt with Amiodarone at 0.5mg/min. Pt with decreased BP and receiving bolus and if NS bolus not successful will start pt on Neosynephrine. Pt has remained safe and free of injury. Pt has no skin break down noted.

## 2017-12-15 NOTE — PLAN OF CARE
Problem: Patient Care Overview  Goal: Plan of Care Review  Outcome: Ongoing (interventions implemented as appropriate)  Patient on nasal cannula 4 lpm. Patient states no shortness of breath noted

## 2017-12-15 NOTE — NURSING
0800- unable to obtain peripheral cuff pressure, Cardiology on the floor Dr. Hayes updated on pt sttaus, ntoified of BP, labile O2 sats, decreased LOC and no urine output. 500 cc bolus given, anesthesia to bedside for A line placement.   0845- Nephrology consult noted, pt now NPO for LORETTA later today. Alfredo d/c pt started on dopamine.  ABG done and results reviewed by Dr. Hayes.  0850- Dr. Peña notified and updated on pt status.  1130- SBP 60's and 70's, CRNA at bedside prepping for LORETTA, pt deemed too unstable for procedure at this time. Alfredo restarted. Amiodarone paused per Dr. Gutierrez. Pt lips and extremities appear bluish purple, pt remains on NRB.   1140-BP continues to decline, Dr. Gutierrez notified, levophed gtt started.  1200- Dr. Peña notified of change in pt status and came to bedside. Pt extremely restless and adimate about getting out of bed to have BM, refusing bedpan. MD and RN informed pt he was unable to get out of the bed. Pt remains anuric, MD aware. BP now stabilized with addition of levophed. Per Dr. Peña, amiodarone turned back on, will titrate up on levophed and down on dopamine and alfredo.  1423- Handoff given to Clare with Cath lab, levophed/dopmaine/amio infusing. Pt alert and oriented, family now to bedside.  1612- pt received on IABP from cath lab, alert and oriented.  1645- Remains with no urine ouput. Unable to doppler bilateral pedal pulses, doppler R posterior tibial pulse Dr. Hayes called and notified.  1720- Spoke w urology Dr. Elam; directed to order renal/bladder US if pt has no  in UO within the hour.  1824- pt continues with no urine output, US ordered.  1900- remain unable to palpate or doppler pedal pulses, Dr. Hayes notified, lower extremity arterial study ordered.  1910- handoff given to oncoming RNNikia.

## 2017-12-15 NOTE — ASSESSMENT & PLAN NOTE
Currently on enox 1mg/kg, will change to qd dosing given rise in creat.  Size of PE seems small to be causing his hypotension, more likely d/t acute CHF and possible loss of atrial kick.

## 2017-12-16 NOTE — PLAN OF CARE
Problem: Patient Care Overview  Goal: Plan of Care Review  Outcome: Ongoing (interventions implemented as appropriate)  Continues on IABP 1:1, awaiting bed assignment for transfer to  ICU . Dopamine at 15mcg/kg/min, Levophed weaned to 0.18mcg/kg/min. Right groin w/[place] c/d/i, no hematoma, no redness or drainage. Pedal pulses absent. Popliteal pulses doppler. Amiodarone drip discontinued. SR on monitor, HR 90's. Afebrile. Only 20cc dark brown urine output this shift, MD aware. Morphine 2mg ivp given x1 for complaints of back pain. Wife at bedside, updated on plan of care, verbalized understanding.

## 2017-12-16 NOTE — ASSESSMENT & PLAN NOTE
Secondary to ETOH use and also possible passive congestion secondary to significant CHF.  Now with Shock Liver as above.

## 2017-12-16 NOTE — ASSESSMENT & PLAN NOTE
Diuresis and afterload reduction as tolerated.  IABP in place.  Pt oliguric and creat rising, neph on board.  IABP and SGC placed yesterday (PAWP 40 mmHg)  Eventual cath will be needed to assess for cause of CMP (pt reports no prior cath).  Will hold off until creat normalizes.

## 2017-12-16 NOTE — HOSPITAL COURSE
51 y/o male presented with lower extremity swelling and dyspnea.  Noted to be in AFib and admitted to ICU on Amiodarone gtt.  Also noted to have new PE and started on therapeutic Lovenox.  Patient with increasing hypoxia and hypotension.  Requiring NRB and pressors.  Now with multiorgan failure.  Cardiogenic shock requiring 2 pressors.  Worsening renal failure with minimal urine output, hyperkalemia and worsening acidosis.  Shock liver with AST>20,000.  Cardiology placed balloon pump.  Nephrology consulted.

## 2017-12-16 NOTE — PROGRESS NOTES
[facility] Hospital Medicine  Progress Note    Patient Name: Altaf Guo  MRN: [id number]  Patient Class: IP- Inpatient   Admission Date: 12/14/2017  Length of Stay: 2 days  Attending Physician: Braydon Peña MD  Primary Care Provider: [facility]        Subjective:     Principal Problem:Acute pulmonary embolism    HPI:  51 y/o male presents with lower extremity swelling and shortness of breath.  Patient was very anxious and restless upon presentation to the ICU.  He was given Ativan and currently very sleepy.  History is obtained from patient's spouse.  Patient started complaining of bilateral lower extremity swelling last week.  He also started complaining of non productive cough.  Wife stating that he also looked short of breath.  Patient did not want to seek medical attention.  Dyspnea greatly worsened yesterday.  Wife insisted on patient coming to ER this morning.  No fever or chills.  Patient has hx of DVT/PE, but stopped taking Xarelto several months ago because he could not afford it.  He also has hx of AFib.  Patient was noted to be tachycardic in ER.  He was placed on Diltiazem gtt and admitted to ICU.  He also apparently stopped taking his B blocker because of abdominal discomfort.  No other complaints.    Hospital Course:  51 y/o male presented with lower extremity swelling and dyspnea.  Noted to be in AFib and admitted to ICU on Amiodarone gtt.  Also noted to have new PE and started on therapeutic Lovenox.  Patient with increasing hypoxia and hypotension.  Requiring NRB and pressors.  Now with multiorgan failure.  Cardiogenic shock requiring 2 pressors.  Worsening renal failure with minimal urine output, hyperkalemia and worsening acidosis.  Shock liver with AST>20,000.  Cardiology placed balloon pump.  Nephrology consulted.    Interval History: States he feels well with no complaints.    Review of Systems   HENT: Negative for ear discharge and ear pain.    Eyes:  Negative for pain and itching.   Gastrointestinal: Negative for abdominal distention and abdominal pain.   Skin: Negative for rash and wound.     Objective:     Vital Signs (Most Recent):  Temp: 97.6 °F (36.4 °C) (12/16/17 0315)  Pulse: 92 (12/16/17 1315)  Resp: (!) 8 (12/16/17 1315)  BP: 111/67 (12/16/17 0315)  SpO2: (!) 91 % (12/16/17 1315) Vital Signs (24h Range):  Temp:  [97.1 °F (36.2 °C)-98.1 °F (36.7 °C)] 97.6 °F (36.4 °C)  Pulse:  [] 92  Resp:  [7-39] 8  SpO2:  [84 %-100 %] 91 %  BP: ()/(62-76) 111/67  Arterial Line BP: ()/(47-78) 90/56     Weight: 126 kg (277 lb 12.5 oz)  Body mass index is 35.66 kg/m².    Intake/Output Summary (Last 24 hours) at 12/16/17 1324  Last data filed at 12/16/17 1300   Gross per 24 hour   Intake          4767.25 ml   Output               33 ml   Net          4734.25 ml      Physical Exam   Constitutional: He is oriented to person, place, and time. He appears well-developed and well-nourished.   HENT:   Head: Normocephalic and atraumatic.   Eyes: Conjunctivae are normal. Right eye exhibits no discharge. Left eye exhibits no discharge.   Neck: Neck supple.   Cardiovascular: Normal rate, regular rhythm and normal heart sounds.    Pulmonary/Chest: Effort normal. No stridor. He has no wheezes.   Abdominal: Soft. Bowel sounds are normal.   Musculoskeletal: He exhibits edema. He exhibits no deformity.   Neurological: He is alert and oriented to person, place, and time.   Skin: Skin is warm.       Significant Labs:   BMP:     Recent Labs  Lab 12/16/17  0303   *   *   K 5.6*   CL 92*   CO2 13*   BUN 36*   CREATININE 4.5*   CALCIUM 6.4*     CBC:     Recent Labs  Lab 12/15/17  0146 12/16/17  0303   WBC 14.01* 16.86*   HGB 14.7 16.4   HCT 44.9 50.3    229       Significant Imaging: I have reviewed all pertinent imaging results/findings within the past 24 hours.    Assessment/Plan:      * Acute pulmonary embolism    Hx of previous DVT/PE.  Stopped taking  Xarelto months ago because he was unable to afford it.  Will probably need lifelong anticoagulation.  Coumadin therapy would be most economical option.  Placed on therapeutic Lovenox.  Doubt this is contributing much to patient's picture.            Cardiogenic shock    Echo showing EF of 10-15% with significant diastolic dysfunction.  Started getting more hypotensive requiring pressor support.  Initially started on Dopamine with not much improvement in BP.  Then started on Levophed.  Cardiology following.  S/p balloon pump placement.  Now with multiorgan failure.  Worsening ARF with minimal urine output, hyperkalemia and worsening metabolic acidosis.  Nephrology following.  Poor HD candidate with persistent hypotension.  Shock liver with AST>20,000!  Discussed critical condition with patient, wife and daughter.  Awaiting transfer to [facility].        ARF (acute renal failure)    Probably secondary to hypotension.  Nephrology consulted.  As above.          Atrial fibrillation    Unsure if paroxysmal or chronic.  Presented with rapid AFib.  Initially placed on Diltiazem gtt.  Cardiology consulted and he was switched to Amiodarone gtt.  Converted to NSR.  Stopping Amio secondary to shock liver.            Elevated LFTs    Secondary to ETOH use and also possible passive congestion secondary to significant CHF.  Now with Shock Liver as above.        History of medication noncompliance              Chronic combined systolic and diastolic heart failure    Cardiogenic shock as above.          Type 2 diabetes mellitus, controlled    Place on diabetic diet and insulin sliding scale.        Essential hypertension    Patient currently hypotensive and requiring vasopressor support.        Elevated troponin    Probably secondary to rapid Afib and significant CHF.  No chest pain.  Cards consulted.          Alcoholism    Apparently recently quit.  He was very anxious upon presentation to ICU.  DT prophylaxis with scheduled  Valium.  Prn Ativan.   Also give MVI, Thiamine and Folic Acid.          VTE Risk Mitigation         Ordered     enoxaparin injection 120 mg  Every 24 hours (non-standard times)     Route:  Subcutaneous        12/15/17 0838     Medium Risk of VTE  Once      12/14/17 1050     Place HENRIQUE hose  Until discontinued      12/14/17 1050     Place sequential compression device  Until discontinued      12/14/17 1050          Critical care time spent on the evaluation and treatment of severe organ dysfunction, review of pertinent labs and imaging studies, discussions with consulting providers and discussions with patient/family: 60 minutes.    Braydon Peña MD  Department of Hospital Medicine   [facility]

## 2017-12-16 NOTE — SUBJECTIVE & OBJECTIVE
Review of Systems   Gastrointestinal: Negative for melena.   Genitourinary: Negative for hematuria.     Objective:     Vital Signs (Most Recent):  Temp: 97.6 °F (36.4 °C) (12/16/17 0315)  Pulse: 91 (12/16/17 1200)  Resp: (!) 28 (12/16/17 1200)  BP: 111/67 (12/16/17 0315)  SpO2: 100 % (12/16/17 1200) Vital Signs (24h Range):  Temp:  [97.1 °F (36.2 °C)-98.1 °F (36.7 °C)] 97.6 °F (36.4 °C)  Pulse:  [] 91  Resp:  [9-39] 28  SpO2:  [84 %-100 %] 100 %  BP: ()/(62-76) 111/67  Arterial Line BP: ()/(47-87) 100/60     Weight: 126 kg (277 lb 12.5 oz)  Body mass index is 35.66 kg/m².     SpO2: 100 %  O2 Device (Oxygen Therapy): nasal cannula      Intake/Output Summary (Last 24 hours) at 12/16/17 1216  Last data filed at 12/16/17 1200   Gross per 24 hour   Intake          5180.06 ml   Output               33 ml   Net          5147.06 ml       Lines/Drains/Airways     Drain                 Urethral Catheter 12/15/17 1100 Coude 14 Fr. 1 day          Arterial Line                 Arterial Line 12/15/17 0830 Left Radial 1 day          Line                 IABP 12/15/17 1551 8.0 Fr. 50 mL less than 1 day          Peripheral Intravenous Line                 Peripheral IV - Single Lumen 12/14/17 0749 Right Antecubital 2 days         Peripheral IV - Single Lumen 12/14/17 0952 Right Forearm 2 days                Physical Exam   Constitutional: He is oriented to person, place, and time. He appears well-developed and well-nourished.   HENT:   Head: Normocephalic and atraumatic.   Eyes: Conjunctivae and EOM are normal. Pupils are equal, round, and reactive to light. No scleral icterus.   Neck: Neck supple. No JVD present. Carotid bruit is not present. No tracheal deviation present. No thyromegaly present.   Cardiovascular: Normal rate, regular rhythm, S1 normal and S2 normal.  Exam reveals no gallop and no friction rub.    No murmur heard.  IABP/Tyler in R groin.   Pulmonary/Chest: Effort normal and breath sounds normal.    Abdominal: Soft. He exhibits no distension.   Musculoskeletal: He exhibits edema.   Neurological: He is alert and oriented to person, place, and time. He has normal strength. No cranial nerve deficit.   Skin: Skin is dry. No rash noted.   Cool extremities   Psychiatric: He has a normal mood and affect. His behavior is normal.       Current Medications:   atorvastatin  40 mg Oral Daily    clopidogrel  75 mg Oral Daily    diazePAM  5 mg Oral Q8H    enoxaparin  1 mg/kg Subcutaneous Q24H    folic acid  1 mg Oral Daily    multivitamin  1 tablet Oral Daily    senna-docusate 8.6-50 mg  1 tablet Oral BID    sodium chloride 0.9%  3 mL Intravenous Q8H    thiamine  100 mg Oral Daily      amiodarone 0.5 mg/min (12/16/17 1200)    DOPamine 15 mcg/kg/min (12/16/17 1207)    norepinephrine bitartrate-D5W 0.2 mcg/kg/min (12/16/17 1208)    phenylephrine Stopped (12/15/17 1350)     acetaminophen, atropine, dextrose 50%, dextrose 50%, glucagon (human recombinant), glucose, glucose, hydrocodone-acetaminophen 5-325mg, insulin aspart, lorazepam, morphine, ondansetron, ondansetron, oxyCODONE, sodium chloride 0.9%    Laboratory:  CBC:    Recent Labs  Lab 12/14/17  0740 12/15/17  0146 12/16/17  0303   WHITE BLOOD CELL COUNT 11.47 14.01 H 16.86 H   HEMOGLOBIN 15.8 14.7 16.4   HEMATOCRIT 46.8 44.9 50.3   PLATELETS 291 204 229       CHEMISTRIES:    Recent Labs  Lab 06/10/16  0540 03/07/17  0457 03/08/17  0632 12/14/17  0740 12/15/17  0146 12/16/17  0303   GLUCOSE 134 H 174 H 170 H 150 H 173 H 158 H   SODIUM 137 140 137 137 134 L 126 L   POTASSIUM 4.2 3.5 3.7 3.8 5.0 5.6 H   BUN BLD 8 15 13 21 H 26 H 36 H   CREATININE 0.8 1.2 0.9 1.4 2.8 H 4.5 H   EGFR IF  >60.0 >60 >60 >60 29 A 16 A   EGFR IF NON- >60.0 >60 >60 58 A 25 A 14 A   CALCIUM 8.7 9.3 8.5 L 9.2 8.2 L 6.4 LL   MAGNESIUM 1.7 1.7 1.7  --   --   --        CARDIAC BIOMARKERS:    Recent Labs  Lab 12/14/17  0740 12/14/17  1022 12/14/17  9045    TROPONIN I 0.668 H 0.628 H 0.561 H       COAGS:    Recent Labs  Lab 06/09/16  0400 06/10/16  0540 03/07/17  0457   INR 1.1 1.0 1.0       LIPIDS/LFTS:    Recent Labs  Lab 06/07/16  0958 06/07/16  1433  03/08/17  0632 12/14/17  0740 12/16/17  0303   CHOLESTEROL 152 139  --   --   --   --    TRIGLYCERIDES 159 H 182 H  --   --   --   --    HDL 42 37 L  --   --   --   --    LDL CHOLESTEROL 78.2 65.6  --   --   --   --    NON-HDL CHOLESTEROL 110 102  --   --   --   --    AST  --   --   < > 16 93 H >20,000 H   ALT  --   --   < > 15 98 H 5,648 H   < > = values in this interval not displayed.    Lab Results   Component Value Date    TSH 7.973 (H) 12/15/2017     Free T4 1.10 (12/15/17)    Diagnostic Results:  ECG (personally reviewed tracings):   12/14/17 1017   12/15/17 1216 SR 92, PRWP, NSSTTW changes    Chest X-Ray (personally reviewed image(s)):   12/14/17 cmeg, NAD  12/16/17 SGC and IABP in place    RHC/IABP 12/15/17:  RA 32  RV 58/12/23  PA 58/28/42  PAWP 40  CO 3.6 L/min  Hemostasis:  RFV sheath sutured in place with SGC  RFA IABP sutured in place  Impression:  Cardiogenic shock  AF/RVR, unable to perform LORETTA/DCCV (anesthesia svc uncomfortable with pt's BP).  Pulm Emb (small)  Plan:  Cont supportive care  Transfer to [facility] planned  Wean pressors and initiate GDMT as BP will allow    LE art US 12/15/17  -No evidence of arterial occlusion in the lower extremities.  -Decreased velocities throughout the lower extremity, in keeping with low ejection fraction.  -Difficult characterization of arterial waveforms secondary to overall poor flow.    CTA Chest 12/14/17  Findings suspicious for small emboli within the basal segmental branches of the right lower lobe, noting evaluation is limited by patient respiratory motion artifact. Clinical correlation advised. No large central pulmonary embolus is apparent.  Focal consolidation in the right lung base. Differential considerations include pneumonia, aspiration, or  pulmonary infarction.  Cardiomegaly with small pericardial effusion. Small right pleural effusion.  Mild diffuse bronchial wall thickening.  Nodular thickening of the adrenal glands.   Small amount of ascites over the spleen.    Echo: 12/14/17    1 - Severely depressed left ventricular systolic function (EF 10-15%).     2 - Concentric hypertrophy.     3 - Mild left ventricular enlargement.     4 - Biatrial enlargement.     5 - Restrictive LV filling pattern, indicating markedly elevated LAP (grade 3 diastolic dysfunction).     6 - Right ventricular enlargement with moderately to severely depressed systolic function.     7 - The estimated PA systolic pressure is 37 mmHg.     8 - Mild mitral regurgitation.     9 - Moderate tricuspid regurgitation.     10 - Mild pulmonic regurgitation.     11 - Small pericardial effusion.

## 2017-12-16 NOTE — PLAN OF CARE
Problem: Patient Care Overview  Goal: Plan of Care Review  Pt remains free of falls/injury this shift, s/p RHC with IABP placement, IABP 1:1, dsg CDI, Amiodarone infusing @ 0.5mg/min, levophed titrated to @ 0.25 to keep map >65, dopamine infusing @ 15mcg, pt anuric, md aware, urology and nephrology following, subramanian intact renal u/s done, pt remains without pedal pulses, doppler popliteal pulses, arterial u/s done, no occlusion, skin cold and dusky, pt AAOX4, afebrile, maintaining sats on 50% venti mask, awaiting transfer to [facility] CMICU,

## 2017-12-16 NOTE — HPI
51 y/o critically ill male w/ acute PE admitted to ICU, s/p RHC and IABP yesterday, requiring multiple pressors to maintain BP, and with GULSHAN. Noted yesterday to have low UOP and subramanian placed by RN. She reported that subramanian passed easily but without appreciable return of urine. Subramanian aspirated and irrigated appropriately. Subsequent US demonstrated no hydro and subramanian within bladder. UOP has remained minimal overnight. He is [place] [facility]

## 2017-12-16 NOTE — ASSESSMENT & PLAN NOTE
Unsure if paroxysmal or chronic.  Presented with rapid AFib.  Initially placed on Diltiazem gtt.  Cardiology consulted and he was switched to Amiodarone gtt.

## 2017-12-16 NOTE — PLAN OF CARE
12/15/17 1908   Discharge Assessment   Assessment Type Discharge Planning Assessment   Confirmed/corrected address and phone number on facesheet? No   Assessment information obtained from? Medical Record   Patient/Family In Agreement With Plan other (see comments)   patient expected to transfer to [facility] when bed available. SW left voice mail for medicaid screener Ofelia [phone] at 4:30 as no insurance indicated in chart. Patient was not available when SW rounded today.  SW/CM will continue assessment at later time, follow and  assist as needed.

## 2017-12-16 NOTE — ASSESSMENT & PLAN NOTE
Echo showing EF of 10-15% with significant diastolic dysfunction.  Started getting more hypotensive requiring pressor support.  Initially started on Dopamine with not much improvement in BP.  Then started on Levophed.  Cardiology following.  Plan for balloon pump placement.

## 2017-12-16 NOTE — CONSULTS
[facility]  Urology  Consult Note    Patient Name: Altaf Guo  MRN: [id number]  Admission Date: 12/14/2017  Hospital Length of Stay: 2   Code Status: Full Code   Attending Provider: Braydon Peña MD   Consulting Provider: Juan Manuel Lemus MD  Primary Care Physician: [facility]  Principal Problem:Acute pulmonary embolism    Inpatient consult to Urology  Consult performed by: JUAN MANUEL LEMUS  Consult ordered by: BRAYDON PEÑA          Subjective:     HPI:  51 y/o critically ill male w/ acute PE admitted to ICU, s/p RHC and IABP yesterday, requiring multiple pressors to maintain BP, and with GULSHAN. Noted yesterday to have low UOP and subramanian placed by RN. She reported that subramanian passed easily but without appreciable return of urine. Subramanian aspirated and irrigated appropriately. Subsequent US demonstrated no hydro and subramanian within bladder. UOP has remained minimal overnight. He is pending transfer to [facility].    Past Medical History:   Diagnosis Date    Alcoholic hepatitis 3/27/2013    Anticoagulant long-term use     Atrial fibrillation 6/7/2016    CHF (congestive heart failure)     DVT (deep venous thrombosis)     Embolic stroke involving right middle cerebral artery 6/6/2016    Embolic stroke involving right middle cerebral artery 6/6/2016    Enlarged LA (left atrium) 6/7/2016    Hypertension     Persistent atrial fibrillation 8/9/2014    Pulmonary embolism        History reviewed. No pertinent surgical history.    Review of patient's allergies indicates:  No Known Allergies    Family History     Problem Relation (Age of Onset)    Arthritis Mother, Maternal Grandmother    Diabetes Maternal Grandmother, Maternal Grandfather    Kidney disease Mother          Social History Main Topics    Smoking status: Current Every Day Smoker     Packs/day: 0.50     Years: 26.00     Types: Cigarettes    Smokeless tobacco: Never Used    Alcohol use 1.8 oz/week     3 Shots of liquor  per week      Comment: states quit 1 week ago    Drug use: Yes     Types: Marijuana    Sexual activity: Yes     Partners: Female       Review of Systems   Unable to perform ROS      Objective:     Temp:  [97.1 °F (36.2 °C)-98.1 °F (36.7 °C)] 97.6 °F (36.4 °C)  Pulse:  [] 93  Resp:  [11-39] 27  SpO2:  [82 %-100 %] 100 %  BP: ()/(62-76) 111/67  Arterial Line BP: ()/() 101/61     Body mass index is 35.66 kg/m².      Date 12/16/17 0700 - 12/17/17 0659   Shift 6753-3491 3609-9777 0049-2015 24 Hour Total   I  N  T  A  K  E   I.V.  (mL/kg) 636  (5)   636  (5)    Shift Total  (mL/kg) 636  (5)   636  (5)   O  U  T  P  U  T   Urine  (mL/kg/hr) 23   23    Shift Total  (mL/kg) 23  (0.2)   23  (0.2)   Weight (kg) 126 126 126 126          Drains     Drain                 Urethral Catheter 12/15/17 1100 Coude 14 Fr. less than 1 day                Physical Exam   Constitutional: He appears well-developed and well-nourished.   HENT:   Head: Normocephalic and atraumatic.   Pulmonary/Chest: No respiratory distress.   Genitourinary:   Genitourinary Comments: Laura in place with small amount of dark urine in tubing       Significant Labs:    BMP:    Recent Labs  Lab 12/14/17  0740 12/15/17  0146 12/16/17  0303    134* 126*   K 3.8 5.0 5.6*   CL 96 91* 92*   CO2 28 18* 13*   BUN 21* 26* 36*   CREATININE 1.4 2.8* 4.5*   CALCIUM 9.2 8.2* 6.4*       CBC:    Recent Labs  Lab 12/14/17  0740 12/15/17  0146 12/16/17  0303   WBC 11.47 14.01* 16.86*   HGB 15.8 14.7 16.4   HCT 46.8 44.9 50.3    204 229       Significant Imaging:  (all images personally reviewed; agree with report below)  Results for orders placed during the hospital encounter of 12/14/17   US Retroperitoneal Complete (Kidney and    Narrative Technique: Grayscale, color flow and Doppler spectral analysis of the kidneys was performed.    Comparison: None.    Findings:    RIGHT KIDNEY:    The kidney measures 12 x 6 x 6 cm.  It demonstrates  cortical thinning, increased cortical echogenicity and loss of corticomedullary differentiation.  Perfusion is decreased with an increased resistive index of 1.  There is a 0.7 cm echogenic focus noted adjacent to the inferior pole, possibly an AML.  No nephrolithiasis or hydronephrosis.    LEFT KIDNEY:    The kidney measures 12 x 6 x 7 cm.  It demonstrates cortical thinning, increased cortical echogenicity and loss of corticomedullary differentiation.  Perfusion is decreased with an increased resistive index of 1.  No solid renal masses.  No nephrolithiasis or hydronephrosis.    BLADDER:    The bladder is decompressed around a Subramanian catheter.    Impression Findings consistent with chronic medical renal disease.      Electronically signed by: JENAE LANGLEY MD  Date:     12/16/17  Time:    05:53                           Assessment and Plan:     ARF (acute renal failure)    -- Anuric with no evidence of obstructive element  -- Subramanian position confirmed on ultrasound  -- Further management per nephrology          He has no acute urologic issues. Oliguria 2/2 ARF with appropriately placed subramanian catheter.    Will sign off.  Please call with any questions or concerns.     Juan Manuel Quan MD  Urology  [facility]

## 2017-12-16 NOTE — ASSESSMENT & PLAN NOTE
Unsure if paroxysmal or chronic.  Presented with rapid AFib.  Initially placed on Diltiazem gtt.  Cardiology consulted and he was switched to Amiodarone gtt.  Converted to NSR.  Stopping Amio secondary to shock liver.

## 2017-12-16 NOTE — PROGRESS NOTES
[facility] Hospital Medicine  Progress Note    Patient Name: Altaf Guo  MRN: [id number]  Patient Class: IP- Inpatient   Admission Date: 12/14/2017  Length of Stay: 1 days  Attending Physician: Braydon Peña MD  Primary Care Provider: [facility]        Subjective:     Principal Problem:Acute pulmonary embolism    HPI:  53 y/o male presents with lower extremity swelling and shortness of breath.  Patient was very anxious and restless upon presentation to the ICU.  He was given Ativan and currently very sleepy.  History is obtained from patient's spouse.  Patient started complaining of bilateral lower extremity swelling last week.  He also started complaining of non productive cough.  Wife stating that he also looked short of breath.  Patient did not want to seek medical attention.  Dyspnea greatly worsened yesterday.  Wife insisted on patient coming to ER this morning.  No fever or chills.  Patient has hx of DVT/PE, but stopped taking Xarelto several months ago because he could not afford it.  He also has hx of AFib.  Patient was noted to be tachycardic in ER.  He was placed on Diltiazem gtt and admitted to ICU.  He also apparently stopped taking his B blocker because of abdominal discomfort.  No other complaints.    Hospital Course:  53 y/o male presented with lower extremity swelling and dyspnea.  Noted to be in AFib and admitted to ICU on Amiodarone gtt.  Also noted to have new PE and started on therapeutic Lovenox.  Patient with increasing hypoxia and hypotension.  Requiring NRB and pressors.    Interval History: No complaints, but worsening hypoxia and hypotension.    Review of Systems   HENT: Negative for ear discharge and ear pain.    Eyes: Negative for pain and itching.   Gastrointestinal: Negative for abdominal distention and abdominal pain.   Skin: Negative for rash and wound.     Objective:     Vital Signs (Most Recent):  Temp: 97.1 °F (36.2 °C) (12/15/17  1700)  Pulse: 96 (12/15/17 1745)  Resp: (!) 34 (12/15/17 1745)  BP: (!) 150/76 (12/15/17 1600)  SpO2: 96 % (12/15/17 1745) Vital Signs (24h Range):  Temp:  [97.1 °F (36.2 °C)-97.8 °F (36.6 °C)] 97.1 °F (36.2 °C)  Pulse:  [] 96  Resp:  [18-44] 34  SpO2:  [82 %-100 %] 96 %  BP: ()/(37-87) 150/76  Arterial Line BP: ()/() 85/62     Weight: 115.7 kg (255 lb 1.2 oz)  Body mass index is 32.75 kg/m².    Intake/Output Summary (Last 24 hours) at 12/15/17 1805  Last data filed at 12/15/17 1400   Gross per 24 hour   Intake          1836.84 ml   Output              300 ml   Net          1536.84 ml      Physical Exam   Constitutional: He is oriented to person, place, and time. He appears well-developed and well-nourished.   Restless   HENT:   Head: Normocephalic and atraumatic.   Eyes: Conjunctivae are normal. Right eye exhibits no discharge. Left eye exhibits no discharge.   Neck: Neck supple.   Cardiovascular: Normal heart sounds.    Irregularly irregular   Pulmonary/Chest: No stridor.   Tachypneic  Coarse BS bilaterally   Abdominal: Soft. Bowel sounds are normal.   Musculoskeletal: He exhibits edema. He exhibits no deformity.   Neurological: He is alert and oriented to person, place, and time.   Skin: Skin is warm.       Significant Labs:   BMP:   Recent Labs  Lab 12/15/17  0146   *   *   K 5.0   CL 91*   CO2 18*   BUN 26*   CREATININE 2.8*   CALCIUM 8.2*     CBC:   Recent Labs  Lab 12/14/17  0740 12/15/17  0146   WBC 11.47 14.01*   HGB 15.8 14.7   HCT 46.8 44.9    204       Significant Imaging: I have reviewed all pertinent imaging results/findings within the past 24 hours.    Assessment/Plan:      * Acute pulmonary embolism    Hx of previous DVT/PE.  Stopped taking Xarelto months ago because he was unable to afford it.  Will probably need lifelong anticoagulation.  Coumadin therapy would be most economical option.  Placed on therapeutic Lovenox.            Cardiogenic shock    Echo  showing EF of 10-15% with significant diastolic dysfunction.  Started getting more hypotensive requiring pressor support.  Initially started on Dopamine with not much improvement in BP.  Then started on Levophed.  Cardiology following.  Plan for balloon pump placement.        ARF (acute renal failure)    Minimal urine output.  Probably secondary to hypotension.  Nephrology consulted.          Atrial fibrillation    Unsure if paroxysmal or chronic.  Presented with rapid AFib.  Initially placed on Diltiazem gtt.  Cardiology consulted and he was switched to Amiodarone gtt.            Elevated LFTs    Secondary to ETOH use and also possible passive congestion secondary to significant CHF.        History of medication noncompliance              Chronic combined systolic and diastolic heart failure    Cardiogenic shock as above.          Type 2 diabetes mellitus, controlled    Place on diabetic diet and insulin sliding scale.        Essential hypertension    Patient currently hypotensive and requiring vasopressor support.        Elevated troponin    Probably secondary to rapid Afib and significant CHF.  No chest pain.  Cards consulted.          Alcoholism    Apparently recently quit.  He was very anxious upon presentation to ICU.  DT prophylaxis with scheduled Valium.  Prn Ativan.   Also give MVI, Thiamine and Folic Acid.          VTE Risk Mitigation         Ordered     enoxaparin injection 120 mg  Every 24 hours (non-standard times)     Route:  Subcutaneous        12/15/17 0838     Medium Risk of VTE  Once      12/14/17 1050     Place HENRIQUE hose  Until discontinued      12/14/17 1050     Place sequential compression device  Until discontinued      12/14/17 1050          Critical care time spent on the evaluation and treatment of severe organ dysfunction, review of pertinent labs and imaging studies, discussions with consulting providers and discussions with patient/family: 50 minutes.    Braydon Peña MD  Department of  Hospital Medicine [facility]

## 2017-12-16 NOTE — PROGRESS NOTES
[facility]  Cardiology  Progress Note    Patient Name: Altaf Guo  MRN: [id number]  Admission Date: 12/14/2017  Hospital Length of Stay: 2 days  Code Status: Full Code   Attending Physician: Braydon Peña MD   Primary Care Physician: [facility]  Expected Discharge Date:   Principal Problem:Acute pulmonary embolism    Subjective:     Hospital Course:   12/15/17: accepted for transfer to [facility], no beds available.  IABP and PA catheter placed.    Interval Hx: pt seen in ICU, case d/w RN, Dr. Can and transfer ctr.  He denies cp/sob.  Bed at [facility] anticipated later today.    Tele: SR 90s (pers rev)        Review of Systems   Gastrointestinal: Negative for melena.   Genitourinary: Negative for hematuria.     Objective:     Vital Signs (Most Recent):  Temp: 97.6 °F (36.4 °C) (12/16/17 0315)  Pulse: 91 (12/16/17 1200)  Resp: (!) 28 (12/16/17 1200)  BP: 111/67 (12/16/17 0315)  SpO2: 100 % (12/16/17 1200) Vital Signs (24h Range):  Temp:  [97.1 °F (36.2 °C)-98.1 °F (36.7 °C)] 97.6 °F (36.4 °C)  Pulse:  [] 91  Resp:  [9-39] 28  SpO2:  [84 %-100 %] 100 %  BP: ()/(62-76) 111/67  Arterial Line BP: ()/(47-87) 100/60     Weight: 126 kg (277 lb 12.5 oz)  Body mass index is 35.66 kg/m².     SpO2: 100 %  O2 Device (Oxygen Therapy): nasal cannula      Intake/Output Summary (Last 24 hours) at 12/16/17 1216  Last data filed at 12/16/17 1200   Gross per 24 hour   Intake          5180.06 ml   Output               33 ml   Net          5147.06 ml       Lines/Drains/Airways     Drain                 Urethral Catheter 12/15/17 1100 Coude 14 Fr. 1 day          Arterial Line                 Arterial Line 12/15/17 0830 Left Radial 1 day          Line                 IABP 12/15/17 1551 8.0 Fr. 50 mL less than 1 day          Peripheral Intravenous Line                 Peripheral IV - Single Lumen 12/14/17 0749 Right Antecubital 2 days         Peripheral IV - Single Lumen 12/14/17 0952  Right Forearm 2 days                Physical Exam   Constitutional: He is oriented to person, place, and time. He appears well-developed and well-nourished.   HENT:   Head: Normocephalic and atraumatic.   Eyes: Conjunctivae and EOM are normal. Pupils are equal, round, and reactive to light. No scleral icterus.   Neck: Neck supple. No JVD present. Carotid bruit is not present. No tracheal deviation present. No thyromegaly present.   Cardiovascular: Normal rate, regular rhythm, S1 normal and S2 normal.  Exam reveals no gallop and no friction rub.    No murmur heard.  IABP/[place] in R groin.   Pulmonary/Chest: Effort normal and breath sounds normal.   Abdominal: Soft. He exhibits no distension.   Musculoskeletal: He exhibits edema.   Neurological: He is alert and oriented to person, place, and time. He has normal strength. No cranial nerve deficit.   Skin: Skin is dry. No rash noted.   Cool extremities   Psychiatric: He has a normal mood and affect. His behavior is normal.       Current Medications:   atorvastatin  40 mg Oral Daily    clopidogrel  75 mg Oral Daily    diazePAM  5 mg Oral Q8H    enoxaparin  1 mg/kg Subcutaneous Q24H    folic acid  1 mg Oral Daily    multivitamin  1 tablet Oral Daily    senna-docusate 8.6-50 mg  1 tablet Oral BID    sodium chloride 0.9%  3 mL Intravenous Q8H    thiamine  100 mg Oral Daily      amiodarone 0.5 mg/min (12/16/17 1200)    DOPamine 15 mcg/kg/min (12/16/17 1207)    norepinephrine bitartrate-D5W 0.2 mcg/kg/min (12/16/17 1208)    phenylephrine Stopped (12/15/17 1350)     acetaminophen, atropine, dextrose 50%, dextrose 50%, glucagon (human recombinant), glucose, glucose, hydrocodone-acetaminophen 5-325mg, insulin aspart, lorazepam, morphine, ondansetron, ondansetron, oxyCODONE, sodium chloride 0.9%    Laboratory:  CBC:    Recent Labs  Lab 12/14/17  0740 12/15/17  0146 12/16/17  0303   WHITE BLOOD CELL COUNT 11.47 14.01 H 16.86 H   HEMOGLOBIN 15.8 14.7 16.4   HEMATOCRIT  46.8 44.9 50.3   PLATELETS 291 204 229       CHEMISTRIES:    Recent Labs  Lab 06/10/16  0540 03/07/17  0457 03/08/17  0632 12/14/17  0740 12/15/17  0146 12/16/17  0303   GLUCOSE 134 H 174 H 170 H 150 H 173 H 158 H   SODIUM 137 140 137 137 134 L 126 L   POTASSIUM 4.2 3.5 3.7 3.8 5.0 5.6 H   BUN BLD 8 15 13 21 H 26 H 36 H   CREATININE 0.8 1.2 0.9 1.4 2.8 H 4.5 H   EGFR IF  >60.0 >60 >60 >60 29 A 16 A   EGFR IF NON- >60.0 >60 >60 58 A 25 A 14 A   CALCIUM 8.7 9.3 8.5 L 9.2 8.2 L 6.4 LL   MAGNESIUM 1.7 1.7 1.7  --   --   --        CARDIAC BIOMARKERS:    Recent Labs  Lab 12/14/17  0740 12/14/17  1020 12/14/17  2309   TROPONIN I 0.668 H 0.628 H 0.561 H       COAGS:    Recent Labs  Lab 06/09/16  0400 06/10/16  0540 03/07/17  0457   INR 1.1 1.0 1.0       LIPIDS/LFTS:    Recent Labs  Lab 06/07/16  0958 06/07/16  1433  03/08/17  0632 12/14/17  0740 12/16/17  0303   CHOLESTEROL 152 139  --   --   --   --    TRIGLYCERIDES 159 H 182 H  --   --   --   --    HDL 42 37 L  --   --   --   --    LDL CHOLESTEROL 78.2 65.6  --   --   --   --    NON-HDL CHOLESTEROL 110 102  --   --   --   --    AST  --   --   < > 16 93 H >20,000 H   ALT  --   --   < > 15 98 H 5,648 H   < > = values in this interval not displayed.    Lab Results   Component Value Date    TSH 7.973 (H) 12/15/2017     Free T4 1.10 (12/15/17)    Diagnostic Results:  ECG (personally reviewed tracings):   12/14/17 1017   12/15/17 1216 SR 92, PRWP, NSSTTW changes    Chest X-Ray (personally reviewed image(s)):   12/14/17 cmeg, NAD  12/16/17 SGC and IABP in place    RHC/IABP 12/15/17:  RA 32  RV 58/12/23  PA 58/28/42  PAWP 40  CO 3.6 L/min  Hemostasis:  RFV sheath sutured in place with SGC  RFA IABP sutured in place  Impression:  Cardiogenic shock  AF/RVR, unable to perform LORETTA/DCCV (anesthesia svc uncomfortable with pt's BP).  Pulm Emb (small)  Plan:  Cont supportive care  Transfer to [facility] planned  Wean pressors and initiate GDMT as BP  will allow    LE art US 12/15/17  -No evidence of arterial occlusion in the lower extremities.  -Decreased velocities throughout the lower extremity, in keeping with low ejection fraction.  -Difficult characterization of arterial waveforms secondary to overall poor flow.    CTA Chest 12/14/17  Findings suspicious for small emboli within the basal segmental branches of the right lower lobe, noting evaluation is limited by patient respiratory motion artifact. Clinical correlation advised. No large central pulmonary embolus is apparent.  Focal consolidation in the right lung base. Differential considerations include pneumonia, aspiration, or pulmonary infarction.  Cardiomegaly with small pericardial effusion. Small right pleural effusion.  Mild diffuse bronchial wall thickening.  Nodular thickening of the adrenal glands.   Small amount of ascites over the spleen.    Echo: 12/14/17    1 - Severely depressed left ventricular systolic function (EF 10-15%).     2 - Concentric hypertrophy.     3 - Mild left ventricular enlargement.     4 - Biatrial enlargement.     5 - Restrictive LV filling pattern, indicating markedly elevated LAP (grade 3 diastolic dysfunction).     6 - Right ventricular enlargement with moderately to severely depressed systolic function.     7 - The estimated PA systolic pressure is 37 mmHg.     8 - Mild mitral regurgitation.     9 - Moderate tricuspid regurgitation.     10 - Mild pulmonic regurgitation.     11 - Small pericardial effusion.       Assessment and Plan:     * Acute pulmonary embolism    Currently on enox 1mg/kg qd dosing given rise in creat.  Size of PE seems small to be causing his hypotension, more likely d/t acute CHF.        Atrial fibrillation    Now in SR  Stop amio gtt given rise in LFTs  Cont lovenox 1mg/kg q 24hrs        Chronic combined systolic and diastolic heart failure    Diuresis and afterload reduction as tolerated.  IABP in place.  Pt oliguric and creat rising, neph on  board.  IABP and SGC placed yesterday (PAWP 40 mmHg)  Eventual cath will be needed to assess for cause of CMP (pt reports no prior cath).  Will hold off until creat normalizes.        Elevated troponin    Likely cath when renal fxn and hemodynamics stabilize.        Type 2 diabetes mellitus, controlled    Per IM         Elevated LFTs    ?shock liver  Stop amio gtt  Stop lipitor        Cardiogenic shock    As above  IABP in place  Pressors being weaned  Eventual GDMT meds        ARF (acute renal failure)    Neph on board        History of medication noncompliance             History of pulmonary embolism             Essential hypertension             Alcoholism                 VTE Risk Mitigation         Ordered     enoxaparin injection 120 mg  Every 24 hours (non-standard times)     Route:  Subcutaneous        12/15/17 0838     Medium Risk of VTE  Once      12/14/17 1050     Place HENRIQUE hose  Until discontinued      12/14/17 1050     Place sequential compression device  Until discontinued      12/14/17 1050        Critical care time 40min    Isaac Hayes MD  Cardiology  [facility]

## 2017-12-16 NOTE — ASSESSMENT & PLAN NOTE
-- Anuric with no evidence of obstructive element  -- Laura position confirmed on ultrasound  -- Further management per nephrology

## 2017-12-16 NOTE — PLAN OF CARE
Problem: Patient Care Overview  Goal: Plan of Care Review  Outcome: Ongoing (interventions implemented as appropriate)  Pt remains in ICU, order to transfer to [facility] [place] in awaiting bed assignment. Currently VSS/afebrile/ on 100% NRB. R heart cath/IABP? [place] placement this afternoon, BP much improved and [place] now off.  Dopamine/levophed/amiodarone/normal saline gtt's transfusing. IV sites CDI, free from signs of infiltration. R. Groin site CDI, no bleeding, no hematoma. Bilateral pedal pulses unable to be detected by doppler. (+) doppler posterior tibial pulses and MD is aware. Pt denies pain or change in sensation to legs/feet.  Subramanian placed earlier today, between 0-200 cc urine seen on bladder scan throughout the day. 80 lasix given today prior to cath procedure. Pt remains anuric. Seen by nephrology earlier today and NS iVF started. Urology consulted and renal/bladder US ordered to confirm subramanian placement; pt remains anuric Md's are aware. See RN progress notes. Only few sips of water with meds today, pt remains NPO for renal ultrasound. 1 Bm earlier today. Pt remains free from injury, safety and skin integrity maintained with precautions in place. Wife now at bedside, pt and family updated on POC and questions answered.

## 2017-12-16 NOTE — SUBJECTIVE & OBJECTIVE
Interval History: States he feels well with no complaints.    Review of Systems   HENT: Negative for ear discharge and ear pain.    Eyes: Negative for pain and itching.   Gastrointestinal: Negative for abdominal distention and abdominal pain.   Skin: Negative for rash and wound.     Objective:     Vital Signs (Most Recent):  Temp: 97.6 °F (36.4 °C) (12/16/17 0315)  Pulse: 92 (12/16/17 1315)  Resp: (!) 8 (12/16/17 1315)  BP: 111/67 (12/16/17 0315)  SpO2: (!) 91 % (12/16/17 1315) Vital Signs (24h Range):  Temp:  [97.1 °F (36.2 °C)-98.1 °F (36.7 °C)] 97.6 °F (36.4 °C)  Pulse:  [] 92  Resp:  [7-39] 8  SpO2:  [84 %-100 %] 91 %  BP: ()/(62-76) 111/67  Arterial Line BP: ()/(47-78) 90/56     Weight: 126 kg (277 lb 12.5 oz)  Body mass index is 35.66 kg/m².    Intake/Output Summary (Last 24 hours) at 12/16/17 1324  Last data filed at 12/16/17 1300   Gross per 24 hour   Intake          4767.25 ml   Output               33 ml   Net          4734.25 ml      Physical Exam   Constitutional: He is oriented to person, place, and time. He appears well-developed and well-nourished.   HENT:   Head: Normocephalic and atraumatic.   Eyes: Conjunctivae are normal. Right eye exhibits no discharge. Left eye exhibits no discharge.   Neck: Neck supple.   Cardiovascular: Normal rate, regular rhythm and normal heart sounds.    Pulmonary/Chest: Effort normal. No stridor. He has no wheezes.   Abdominal: Soft. Bowel sounds are normal.   Musculoskeletal: He exhibits edema. He exhibits no deformity.   Neurological: He is alert and oriented to person, place, and time.   Skin: Skin is warm.       Significant Labs:   BMP:     Recent Labs  Lab 12/16/17  0303   *   *   K 5.6*   CL 92*   CO2 13*   BUN 36*   CREATININE 4.5*   CALCIUM 6.4*     CBC:     Recent Labs  Lab 12/15/17  0146 12/16/17  0303   WBC 14.01* 16.86*   HGB 14.7 16.4   HCT 44.9 50.3    229       Significant Imaging: I have reviewed all pertinent imaging  results/findings within the past 24 hours.

## 2017-12-16 NOTE — PLAN OF CARE
"TN to patient's room to discuss Helping the patient manage care at home.   TN/SW roll explained to pt.  Teach back method used.  "Discharge planning begins on Admission" pamphlet discussed and placed in "My Health Packet" and placed at bedside.     Preferred pharmacy:   [facility] [place], [place] - [address]  [address] [place] [address]  Phone: [phone] Fax: [phone]    [facility] [place], [place] - [address] [address] [facility] [address]  Phone: [phone] Fax: [phone]       12/16/17 1728   Discharge Assessment   Assessment Type Discharge Planning Assessment   Confirmed/corrected address and phone number on facesheet? Yes   Assessment information obtained from? Caregiver   Expected Length of Stay (days) (unknown at present, transfer to  pending)   Communicated expected length of stay with patient/caregiver yes   Prior to hospitilization cognitive status: Alert/Oriented   Prior to hospitalization functional status: Independent   Current cognitive status: (asleep at this time.  Per nurse patien is oX4 when awake)   Current Functional Status: Assistive Equipment;Needs Assistance   Lives With spouse   Able to Return to Prior Arrangements unable to determine at this time (comments)   Is patient able to care for self after discharge? Unable to determine at this time (comments)   Who are your caregiver(s) and their phone number(s)? Wife able to help patient at home   Patient's perception of discharge disposition home or selfcare   Readmission Within The Last 30 Days no previous admission in last 30 days   Patient currently being followed by outpatient case management? No   Patient currently receives any other outside agency services? No   Equipment Currently Used at Home none   Do you have any problems affording any of your prescribed medications? Yes   If yes, what medications? Xarelto, PCP changed to plavix due to patient's " unable to afford Xarelto   Is the patient taking medications as prescribed? yes   Does the patient have transportation home? Yes   Transportation Available family or friend will provide   Does the patient receive services at the Coumadin Clinic? No   Discharge Plan A (TBD)   Discharge Plan B Home with family   Patient/Family In Agreement With Plan yes   Patient's wife at bedside provided all answers.

## 2017-12-16 NOTE — ASSESSMENT & PLAN NOTE
Hx of previous DVT/PE.  Stopped taking Xarelto months ago because he was unable to afford it.  Will probably need lifelong anticoagulation.  Coumadin therapy would be most economical option.  Placed on therapeutic Lovenox.  Doubt this is contributing much to patient's picture.

## 2017-12-16 NOTE — ASSESSMENT & PLAN NOTE
Echo showing EF of 10-15% with significant diastolic dysfunction.  Started getting more hypotensive requiring pressor support.  Initially started on Dopamine with not much improvement in BP.  Then started on Levophed.  Cardiology following.  S/p balloon pump placement.  Now with multiorgan failure.  Worsening ARF with minimal urine output, hyperkalemia and worsening metabolic acidosis.  Nephrology following.  Poor HD candidate with persistent hypotension.  Shock liver with AST>20,000!  Discussed critical condition with patient, wife and daughter.  Awaiting transfer to [facility].

## 2017-12-16 NOTE — ASSESSMENT & PLAN NOTE
Currently on enox 1mg/kg qd dosing given rise in creat.  Size of PE seems small to be causing his hypotension, more likely d/t acute CHF.

## 2017-12-16 NOTE — SUBJECTIVE & OBJECTIVE
Past Medical History:   Diagnosis Date    Alcoholic hepatitis 3/27/2013    Anticoagulant long-term use     Atrial fibrillation 6/7/2016    CHF (congestive heart failure)     DVT (deep venous thrombosis)     Embolic stroke involving right middle cerebral artery 6/6/2016    Embolic stroke involving right middle cerebral artery 6/6/2016    Enlarged LA (left atrium) 6/7/2016    Hypertension     Persistent atrial fibrillation 8/9/2014    Pulmonary embolism        History reviewed. No pertinent surgical history.    Review of patient's allergies indicates:  No Known Allergies    Family History     Problem Relation (Age of Onset)    Arthritis Mother, Maternal Grandmother    Diabetes Maternal Grandmother, Maternal Grandfather    Kidney disease Mother          Social History Main Topics    Smoking status: Current Every Day Smoker     Packs/day: 0.50     Years: 26.00     Types: Cigarettes    Smokeless tobacco: Never Used    Alcohol use 1.8 oz/week     3 Shots of liquor per week      Comment: states quit 1 week ago    Drug use: Yes     Types: Marijuana    Sexual activity: Yes     Partners: Female       Review of Systems   Unable to perform ROS      Objective:     Temp:  [97.1 °F (36.2 °C)-98.1 °F (36.7 °C)] 97.6 °F (36.4 °C)  Pulse:  [] 93  Resp:  [11-39] 27  SpO2:  [82 %-100 %] 100 %  BP: ()/(62-76) 111/67  Arterial Line BP: ()/() 101/61     Body mass index is 35.66 kg/m².      Date 12/16/17 0700 - 12/17/17 0659   Shift 2241-4950 2407-7370 5614-4666 24 Hour Total   I  N  T  A  K  E   I.V.  (mL/kg) 636  (5)   636  (5)    Shift Total  (mL/kg) 636  (5)   636  (5)   O  U  T  P  U  T   Urine  (mL/kg/hr) 23   23    Shift Total  (mL/kg) 23  (0.2)   23  (0.2)   Weight (kg) 126 126 126 126          Drains     Drain                 Urethral Catheter 12/15/17 1100 Coude 14 Fr. less than 1 day                Physical Exam   Constitutional: He appears well-developed and well-nourished.   HENT:    Head: Normocephalic and atraumatic.   Pulmonary/Chest: No respiratory distress.   Genitourinary:   Genitourinary Comments: Laura in place with small amount of dark urine in tubing       Significant Labs:    BMP:    Recent Labs  Lab 12/14/17  0740 12/15/17  0146 12/16/17  0303    134* 126*   K 3.8 5.0 5.6*   CL 96 91* 92*   CO2 28 18* 13*   BUN 21* 26* 36*   CREATININE 1.4 2.8* 4.5*   CALCIUM 9.2 8.2* 6.4*       CBC:    Recent Labs  Lab 12/14/17  0740 12/15/17  0146 12/16/17  0303   WBC 11.47 14.01* 16.86*   HGB 15.8 14.7 16.4   HCT 46.8 44.9 50.3    204 229       Significant Imaging:  (all images personally reviewed; agree with report below)  Results for orders placed during the hospital encounter of 12/14/17   US Retroperitoneal Complete (Kidney and    Narrative Technique: Grayscale, color flow and Doppler spectral analysis of the kidneys was performed.    Comparison: None.    Findings:    RIGHT KIDNEY:    The kidney measures 12 x 6 x 6 cm.  It demonstrates cortical thinning, increased cortical echogenicity and loss of corticomedullary differentiation.  Perfusion is decreased with an increased resistive index of 1.  There is a 0.7 cm echogenic focus noted adjacent to the inferior pole, possibly an AML.  No nephrolithiasis or hydronephrosis.    LEFT KIDNEY:    The kidney measures 12 x 6 x 7 cm.  It demonstrates cortical thinning, increased cortical echogenicity and loss of corticomedullary differentiation.  Perfusion is decreased with an increased resistive index of 1.  No solid renal masses.  No nephrolithiasis or hydronephrosis.    BLADDER:    The bladder is decompressed around a Laura catheter.    Impression Findings consistent with chronic medical renal disease.      Electronically signed by: JENAE LANGLEY MD  Date:     12/16/17  Time:    05:53

## 2017-12-16 NOTE — ASSESSMENT & PLAN NOTE
Hx of previous DVT/PE.  Stopped taking Xarelto months ago because he was unable to afford it.  Will probably need lifelong anticoagulation.  Coumadin therapy would be most economical option.  Placed on therapeutic Lovenox.

## 2017-12-17 PROBLEM — E87.1 HYPONATREMIA: Status: ACTIVE | Noted: 2017-01-01

## 2017-12-17 PROBLEM — K72.00 ACUTE LIVER FAILURE WITHOUT HEPATIC COMA: Status: ACTIVE | Noted: 2017-01-01

## 2017-12-17 PROBLEM — K72.00 ISCHEMIC HEPATITIS: Status: ACTIVE | Noted: 2017-01-01

## 2017-12-17 NOTE — SUBJECTIVE & OBJECTIVE
Review of Systems   Unable to perform ROS: Acuity of condition       Past Medical History:   Diagnosis Date    Alcoholic hepatitis 3/27/2013    Anticoagulant long-term use     Atrial fibrillation 6/7/2016    CHF (congestive heart failure)     DVT (deep venous thrombosis)     Embolic stroke involving right middle cerebral artery 6/6/2016    Embolic stroke involving right middle cerebral artery 6/6/2016    Enlarged LA (left atrium) 6/7/2016    Hypertension     Persistent atrial fibrillation 8/9/2014    Pulmonary embolism        History reviewed. No pertinent surgical history.    Family history of liver disease: No    Review of patient's allergies indicates:  No Known Allergies    Social History Main Topics    Smoking status: Current Every Day Smoker     Packs/day: 0.50     Years: 26.00     Types: Cigarettes    Smokeless tobacco: Never Used    Alcohol use 1.8 oz/week     3 Shots of liquor per week      Comment: states quit 1 week ago    Drug use: Yes     Types: Marijuana    Sexual activity: Yes     Partners: Female       Prescriptions Prior to Admission   Medication Sig Dispense Refill Last Dose    acetaminophen (TYLENOL) 325 MG tablet Take 2 tablets (650 mg total) by mouth every 6 (six) hours as needed for Temperature greater than (100.4).  0 Past Month    amlodipine (NORVASC) 10 MG tablet Take 10 mg by mouth once daily.   Past Week    clopidogrel (PLAVIX) 75 mg tablet Take 75 mg by mouth once daily.   Past Week    folic acid (FOLVITE) 1 MG tablet Take 1 tablet (1 mg total) by mouth once daily. 90 tablet 3 Past Week    FUROSEMIDE (LASIX ORAL) Take 40 mg by mouth once daily.    12/13/2017    lisinopril (PRINIVIL,ZESTRIL) 20 MG tablet Take 20 mg by mouth once daily.   Past Week    metFORMIN (GLUCOPHAGE) 500 MG tablet Take 500 mg by mouth 2 (two) times daily with meals.   Past Month    metoprolol tartrate (LOPRESSOR) 100 MG tablet Take 1 tablet (100 mg total) by mouth 2 (two) times daily. 180  tablet 1 Past Week    potassium chloride SA (K-DUR,KLOR-CON) 10 MEQ tablet Take 10 mEq by mouth once daily.       atorvastatin (LIPITOR) 40 MG tablet Take 1 tablet (40 mg total) by mouth once daily. 90 tablet 3 Not Taking    nicotine (NICODERM CQ) 21 mg/24 hr Place 1 patch onto the skin once daily.  0 More than a month    [DISCONTINUED] rivaroxaban (XARELTO) 20 mg Tab Take 1 tablet (20 mg total) by mouth daily with dinner or evening meal. 30 tablet 0 Not Taking    [DISCONTINUED] thiamine 100 MG tablet Take 1 tablet (100 mg total) by mouth once daily.   Taking       Objective:     Vital Signs (Most Recent):  Temp: 98 °F (36.7 °C) (12/17/17 1100)  Pulse: 101 (12/17/17 1126)  Resp: (!) 29 (12/17/17 1126)  BP: 111/67 (12/16/17 0315)  SpO2: (!) 93 % (12/17/17 1126) Vital Signs (24h Range):  Temp:  [97.7 °F (36.5 °C)-98.1 °F (36.7 °C)] 98 °F (36.7 °C)  Pulse:  [] 101  Resp:  [7-47] 29  SpO2:  [84 %-100 %] 93 %  Arterial Line BP: ()/(46-72) 91/48     Weight: 126 kg (277 lb 12.5 oz) (12/16/17 0315)  Body mass index is 35.66 kg/m².    Physical Exam   Constitutional: He is oriented to person, place, and time. No distress.   HENT:   Head: Normocephalic and atraumatic.   Eyes: No scleral icterus.   Neck: Normal range of motion.   Cardiovascular:   Irregularly irregular rhythm    Pulmonary/Chest:   Decreased breath sounds bilaterally (?body habitus) on inhaled NO   Abdominal: He exhibits no distension and no mass. There is no tenderness. There is no rebound and no guarding. No hernia.   Musculoskeletal:   +lower extremity edema, lower extremity cold to touch, spot where pulses can be heard with doppler have been marked.   Neurological: He is alert and oriented to person, place, and time.   Skin: He is not diaphoretic.       MELD-Na score: 40 at 12/17/2017  5:27 AM  MELD score: 40 at 12/17/2017  5:27 AM  Calculated from:  Serum Creatinine: 5.7 mg/dL (Rounded to 4) at 12/17/2017  5:27 AM  Serum Sodium: 124 mmol/L  (Rounded to 125) at 12/17/2017  5:27 AM  Total Bilirubin: 4.5 mg/dL at 12/17/2017  5:27 AM  INR(ratio): 3.6 at 12/17/2017  5:27 AM  Age: 52 years    Significant Labs:  CBC:   Recent Labs  Lab 12/17/17 0527   WBC 11.17   RBC 4.80   HGB 14.1   HCT 42.5   *     CMP:   Recent Labs  Lab 12/17/17 0527   *   CALCIUM 5.7*   ALBUMIN 2.5*   PROT 5.7*   *   K 4.6   CO2 19*   CL 88*   BUN 47*   CREATININE 5.7*   ALKPHOS 208*   ALT 3,493*   AST 7,771*   BILITOT 4.5*     Coagulation:   Recent Labs  Lab 12/16/17 2257 12/17/17 0527   INR 5.6* 3.6*   APTT 36.1*  --        Significant Imaging:  None

## 2017-12-17 NOTE — CONSULTS
[facility]  Cardiac Electrophysiology  Consult Note    Admission Date: 12/14/2017  Code Status: Full Code   Attending Provider: No att. providers found  Consulting Provider: Yoon Anthony MD  Principal Problem:Cardiogenic shock    Inpatient consult to Electrophysiology  Consult performed by: YOON ANTHONY  Consult ordered by: JOSESITO AMBROCIO  Reason for consult: Afib managment in the setting of hepatic and renal dysfunction with an EF of 10%        Subjective:     Chief Complaint:  Afib management    HPI:   53 yo AAM with a past medical hx of Afib (on Xarelto), HTN, Alcoholic hepatitis, PE and hx of embolic stroke here presented to [place] [facility] [place] [facility] for a higher level of care. The patient was originally admitted on 12/14th with ADHF and AFib with RVR. He was subsequently started on Diltiazem gtt. Afterwards he went into cardiogenic shock requiring an iABP placed in addition to the initiation of pressors of which he is currently on epinephrine and vasopressin at 0.09 and 0.04 respectively. The patient developed ischemic shock liver and ARF as a consequence of his cardiogenic shock. He has been in pAF there and here with one documented episode of atypical aflutter during his admission. Ep was consulted for recommendations for management of his pAF.    Currently the patient denies any cp, SOB, and reports some leg swelling.     Past Medical History:   Diagnosis Date    Alcoholic hepatitis 3/27/2013    Anticoagulant long-term use     Atrial fibrillation 6/7/2016    CHF (congestive heart failure)     DVT (deep venous thrombosis)     Embolic stroke involving right middle cerebral artery 6/6/2016    Embolic stroke involving right middle cerebral artery 6/6/2016    Enlarged [place] (left atrium) 6/7/2016    Hypertension     Persistent atrial fibrillation 8/9/2014    Pulmonary embolism        History reviewed. No pertinent surgical history.    Review of patient's  allergies indicates:  No Known Allergies    No current facility-administered medications on file prior to encounter.      Current Outpatient Prescriptions on File Prior to Encounter   Medication Sig    acetaminophen (TYLENOL) 325 MG tablet Take 2 tablets (650 mg total) by mouth every 6 (six) hours as needed for Temperature greater than (100.4).    amlodipine (NORVASC) 10 MG tablet Take 10 mg by mouth once daily.    clopidogrel (PLAVIX) 75 mg tablet Take 75 mg by mouth once daily.    folic acid (FOLVITE) 1 MG tablet Take 1 tablet (1 mg total) by mouth once daily.    FUROSEMIDE (LASIX ORAL) Take 40 mg by mouth once daily.     metoprolol tartrate (LOPRESSOR) 100 MG tablet Take 1 tablet (100 mg total) by mouth 2 (two) times daily.    atorvastatin (LIPITOR) 40 MG tablet Take 1 tablet (40 mg total) by mouth once daily.    nicotine (NICODERM CQ) 21 mg/24 hr Place 1 patch onto the skin once daily.     Family History     Problem Relation (Age of Onset)    Arthritis Mother, Maternal Grandmother    Diabetes Maternal Grandmother, Maternal Grandfather    Kidney disease Mother        Social History Main Topics    Smoking status: Current Every Day Smoker     Packs/day: 0.50     Years: 26.00     Types: Cigarettes    Smokeless tobacco: Never Used    Alcohol use 1.8 oz/week     3 Shots of liquor per week      Comment: states quit 1 week ago    Drug use: Yes     Types: Marijuana    Sexual activity: Yes     Partners: Female     Review of Systems   Constitution: Negative for chills and fever.   HENT: Negative for congestion and nosebleeds.    Eyes: Negative for pain.   Cardiovascular: Negative for chest pain, dyspnea on exertion, leg swelling and palpitations.   Respiratory: Negative for shortness of breath.    Musculoskeletal: Negative for arthritis and back pain.   Gastrointestinal: Negative for abdominal pain, nausea and vomiting.   Genitourinary: Negative for dysuria and hematuria.     Objective:     Vital Signs (Most  Recent):  Temp: 98.1 °F (36.7 °C) (12/17/17 0700)  Pulse: (!) 128 (12/17/17 1000)  Resp: (!) 33 (12/17/17 1000)  BP: 111/67 (12/16/17 0315)  SpO2: (!) 93 % (12/17/17 1000) Vital Signs (24h Range):  Temp:  [97.7 °F (36.5 °C)-98.1 °F (36.7 °C)] 98.1 °F (36.7 °C)  Pulse:  [] 128  Resp:  [7-47] 33  SpO2:  [84 %-100 %] 93 %  Arterial Line BP: ()/(46-72) 101/51     Weight: 126 kg (277 lb 12.5 oz)  Body mass index is 35.66 kg/m².    SpO2: (!) 93 %  O2 Device (Oxygen Therapy): nasal cannula    Physical Exam   Constitutional: He is oriented to person, place, and time. He appears well-developed and well-nourished.   HENT:   Head: Normocephalic and atraumatic.   Eyes: EOM are normal. Pupils are equal, round, and reactive to light.   Neck: Normal range of motion. Neck supple. JVD (CVP was 23) present.   Cardiovascular:   Tachycardic and irregular rhythm.    Pulmonary/Chest: Effort normal and breath sounds normal.   Abdominal: Soft. Bowel sounds are normal. He exhibits no distension.   Musculoskeletal: He exhibits no edema.   Neurological: He is alert and oriented to person, place, and time. He has normal reflexes.       Significant Labs:   EP:   Recent Labs  Lab 12/16/17  0303 12/16/17  2257 12/17/17  0527   * 123* 124*   K 5.6* 4.8 4.6   CL 92* 89* 88*   CO2 13* 18* 19*   * 140* 120*   BUN 36* 46* 47*   CREATININE 4.5* 5.7* 5.7*   CALCIUM 6.4* 5.7* 5.7*   PROT 6.4 5.8* 5.7*   ALBUMIN 2.7* 2.5* 2.5*   BILITOT 2.9* 4.1* 4.5*   ALKPHOS 181* 216* 208*   AST >20,000* 11,084* 7,771*   ALT 5,648* 4,242* 3,493*   ANIONGAP 21* 16 17*   ESTGFRAFRICA 16* 12.3* 12.3*   EGFRNONAA 14* 10.7* 10.7*   WBC 16.86* 13.66* 11.17   HGB 16.4 15.1 14.1   HCT 50.3 45.5 42.5    172 136*   INR  --  5.6* 3.6*       Significant Imaging: Echocardiogram:   2D echo with color flow doppler:   Results for orders placed or performed during the hospital encounter of 12/14/17   2D echo with color flow doppler   Result Value Ref  Range    EF 10 (A) 55 - 65    Mitral Valve Regurgitation MILD     Diastolic Dysfunction Yes (A)     Est. PA Systolic Pressure 37.09     Pericardial Effusion SMALL (A)     Tricuspid Valve Regurgitation MODERATE (A)      Assessment and Plan:     Atrial fibrillation    51 yo AAM with a past medical hx of Afib (on Xarelto), HTN, Alcoholic hepatitis, PE and hx of embolic stroke here [place] [facility] [place] [facility] for a higher level of care. He is in pAF and may likley go back into sinus. No anticoagulation currently as his INR is supratherapeutic.     Plan:  - Would recommend dig load (250 mcg x1) and then 62.5 mcg there after every 48 hrs.   - Agree with holding anticoagulation  - If needed could emergently DCCV patient with sedation however this is at the discretion of the primary team.     Discussed case with Dr. Nesbitt,              Thank you for your consult. I will follow-up with patient. Please contact us if you have any additional questions.    Yoon Anthony MD  Cardiac Electrophysiology  [facility]

## 2017-12-17 NOTE — PROGRESS NOTES
1935 Report called to Aleksandr Yun at [facility]  2005 [place] [facility] care team at bedside to transport pt to [facility] [place] [id number], Wife at bedside updated on Rm and transfer.   2045 Pt off floor.

## 2017-12-17 NOTE — SUBJECTIVE & OBJECTIVE
Past Medical History:   Diagnosis Date    Alcoholic hepatitis 3/27/2013    Anticoagulant long-term use     Atrial fibrillation 6/7/2016    CHF (congestive heart failure)     DVT (deep venous thrombosis)     Embolic stroke involving right middle cerebral artery 6/6/2016    Embolic stroke involving right middle cerebral artery 6/6/2016    Enlarged LA (left atrium) 6/7/2016    Hypertension     Persistent atrial fibrillation 8/9/2014    Pulmonary embolism        History reviewed. No pertinent surgical history.    Review of patient's allergies indicates:  No Known Allergies    No current facility-administered medications on file prior to encounter.      Current Outpatient Prescriptions on File Prior to Encounter   Medication Sig    acetaminophen (TYLENOL) 325 MG tablet Take 2 tablets (650 mg total) by mouth every 6 (six) hours as needed for Temperature greater than (100.4).    amlodipine (NORVASC) 10 MG tablet Take 10 mg by mouth once daily.    clopidogrel (PLAVIX) 75 mg tablet Take 75 mg by mouth once daily.    folic acid (FOLVITE) 1 MG tablet Take 1 tablet (1 mg total) by mouth once daily.    FUROSEMIDE (LASIX ORAL) Take 40 mg by mouth once daily.     metoprolol tartrate (LOPRESSOR) 100 MG tablet Take 1 tablet (100 mg total) by mouth 2 (two) times daily.    atorvastatin (LIPITOR) 40 MG tablet Take 1 tablet (40 mg total) by mouth once daily.    nicotine (NICODERM CQ) 21 mg/24 hr Place 1 patch onto the skin once daily.     Family History     Problem Relation (Age of Onset)    Arthritis Mother, Maternal Grandmother    Diabetes Maternal Grandmother, Maternal Grandfather    Kidney disease Mother        Social History Main Topics    Smoking status: Current Every Day Smoker     Packs/day: 0.50     Years: 26.00     Types: Cigarettes    Smokeless tobacco: Never Used    Alcohol use 1.8 oz/week     3 Shots of liquor per week      Comment: states quit 1 week ago    Drug use: Yes     Types: Marijuana     Sexual activity: Yes     Partners: Female     Review of Systems   Constitution: Negative for chills and fever.   HENT: Negative for congestion and nosebleeds.    Eyes: Negative for pain.   Cardiovascular: Negative for chest pain, dyspnea on exertion, leg swelling and palpitations.   Respiratory: Negative for shortness of breath.    Musculoskeletal: Negative for arthritis and back pain.   Gastrointestinal: Negative for abdominal pain, nausea and vomiting.   Genitourinary: Negative for dysuria and hematuria.     Objective:     Vital Signs (Most Recent):  Temp: 98.1 °F (36.7 °C) (12/17/17 0700)  Pulse: (!) 128 (12/17/17 1000)  Resp: (!) 33 (12/17/17 1000)  BP: 111/67 (12/16/17 0315)  SpO2: (!) 93 % (12/17/17 1000) Vital Signs (24h Range):  Temp:  [97.7 °F (36.5 °C)-98.1 °F (36.7 °C)] 98.1 °F (36.7 °C)  Pulse:  [] 128  Resp:  [7-47] 33  SpO2:  [84 %-100 %] 93 %  Arterial Line BP: ()/(46-72) 101/51     Weight: 126 kg (277 lb 12.5 oz)  Body mass index is 35.66 kg/m².    SpO2: (!) 93 %  O2 Device (Oxygen Therapy): nasal cannula    Physical Exam   Constitutional: He is oriented to person, place, and time. He appears well-developed and well-nourished.   HENT:   Head: Normocephalic and atraumatic.   Eyes: EOM are normal. Pupils are equal, round, and reactive to light.   Neck: Normal range of motion. Neck supple. JVD (CVP was 23) present.   Cardiovascular:   Tachycardic and irregular rhythm.    Pulmonary/Chest: Effort normal and breath sounds normal.   Abdominal: Soft. Bowel sounds are normal. He exhibits no distension.   Musculoskeletal: He exhibits no edema.   Neurological: He is alert and oriented to person, place, and time. He has normal reflexes.       Significant Labs:   EP:   Recent Labs  Lab 12/16/17  0303 12/16/17  2257 12/17/17  0527   * 123* 124*   K 5.6* 4.8 4.6   CL 92* 89* 88*   CO2 13* 18* 19*   * 140* 120*   BUN 36* 46* 47*   CREATININE 4.5* 5.7* 5.7*   CALCIUM 6.4* 5.7* 5.7*   PROT 6.4  5.8* 5.7*   ALBUMIN 2.7* 2.5* 2.5*   BILITOT 2.9* 4.1* 4.5*   ALKPHOS 181* 216* 208*   AST >20,000* 11,084* 7,771*   ALT 5,648* 4,242* 3,493*   ANIONGAP 21* 16 17*   ESTGFRAFRICA 16* 12.3* 12.3*   EGFRNONAA 14* 10.7* 10.7*   WBC 16.86* 13.66* 11.17   HGB 16.4 15.1 14.1   HCT 50.3 45.5 42.5    172 136*   INR  --  5.6* 3.6*       Significant Imaging: Echocardiogram:   2D echo with color flow doppler:   Results for orders placed or performed during the hospital encounter of 12/14/17   2D echo with color flow doppler   Result Value Ref Range    EF 10 (A) 55 - 65    Mitral Valve Regurgitation MILD     Diastolic Dysfunction Yes (A)     Est. PA Systolic Pressure 37.09     Pericardial Effusion SMALL (A)     Tricuspid Valve Regurgitation MODERATE (A)

## 2017-12-17 NOTE — ASSESSMENT & PLAN NOTE
?shock liver  Amio gtt dropped at [facility](was given for atrial fibrillation)    -continue to monitor  Hepatology consult

## 2017-12-17 NOTE — CONSULTS
[facility]  Hepatology  Consult Note    Patient Name: Altaf Guo  MRN: [id number]  Admission Date: 12/14/2017  Hospital Length of Stay: 3 days  Attending Provider: No att. providers found   Primary Care Physician: [facility]  Principal Problem:Cardiogenic shock    Inpatient consult to Hepatology  Consult performed by: ARIE GRAY  Consult ordered by: ISAC CARRILLO        Subjective:     Transplant status: No    HPI:  52 year old male with a history of HTN, CHF (10-15%), DVT/PE (not on AC on admission), CVA and alcohol abuse currently admitted in cardiogenic shock. GI consulted for acute liver failure/shock.    History provided by patient/wife/chart review.  Patient initially presented to OSH on 12/14/17 with increase shortness of breath as well as lower extremity edema. Patient was found to be in Afib with RVR therefore started of Diltiazem gtt and then switched to a Amio gtt. However patient worsened, requiring pressors (at one point was on Alfredo, Dopamine, and Levophed. Also developed GULSHAN and significant increase in LFT (amio and Lipitor stopped). Lastly he also underwent a RHC and placement of IABP at OSH.    Now on [facility] patient is on Epi, Vaso, and inhaled No. He is also on SLED as per nephrology.   In speaking to the wife patient had been drinking a pint of alcohol a day (unclear as exactly for how long this was or when his last drink was). He has never been diagnosed with cirrhosis or been followed by a hepatologist.    Review of Systems   Unable to perform ROS: Acuity of condition       Past Medical History:   Diagnosis Date    Alcoholic hepatitis 3/27/2013    Anticoagulant long-term use     Atrial fibrillation 6/7/2016    CHF (congestive heart failure)     DVT (deep venous thrombosis)     Embolic stroke involving right middle cerebral artery 6/6/2016    Embolic stroke involving right middle cerebral artery 6/6/2016    Enlarged LA (left atrium) 6/7/2016     Hypertension     Persistent atrial fibrillation 8/9/2014    Pulmonary embolism        History reviewed. No pertinent surgical history.    Family history of liver disease: No    Review of patient's allergies indicates:  No Known Allergies    Social History Main Topics    Smoking status: Current Every Day Smoker     Packs/day: 0.50     Years: 26.00     Types: Cigarettes    Smokeless tobacco: Never Used    Alcohol use 1.8 oz/week     3 Shots of liquor per week      Comment: states quit 1 week ago    Drug use: Yes     Types: Marijuana    Sexual activity: Yes     Partners: Female       Prescriptions Prior to Admission   Medication Sig Dispense Refill Last Dose    acetaminophen (TYLENOL) 325 MG tablet Take 2 tablets (650 mg total) by mouth every 6 (six) hours as needed for Temperature greater than (100.4).  0 Past Month    amlodipine (NORVASC) 10 MG tablet Take 10 mg by mouth once daily.   Past Week    clopidogrel (PLAVIX) 75 mg tablet Take 75 mg by mouth once daily.   Past Week    folic acid (FOLVITE) 1 MG tablet Take 1 tablet (1 mg total) by mouth once daily. 90 tablet 3 Past Week    FUROSEMIDE (LASIX ORAL) Take 40 mg by mouth once daily.    12/13/2017    lisinopril (PRINIVIL,ZESTRIL) 20 MG tablet Take 20 mg by mouth once daily.   Past Week    metFORMIN (GLUCOPHAGE) 500 MG tablet Take 500 mg by mouth 2 (two) times daily with meals.   Past Month    metoprolol tartrate (LOPRESSOR) 100 MG tablet Take 1 tablet (100 mg total) by mouth 2 (two) times daily. 180 tablet 1 Past Week    potassium chloride SA (K-DUR,KLOR-CON) 10 MEQ tablet Take 10 mEq by mouth once daily.       atorvastatin (LIPITOR) 40 MG tablet Take 1 tablet (40 mg total) by mouth once daily. 90 tablet 3 Not Taking    nicotine (NICODERM CQ) 21 mg/24 hr Place 1 patch onto the skin once daily.  0 More than a month    [DISCONTINUED] rivaroxaban (XARELTO) 20 mg Tab Take 1 tablet (20 mg total) by mouth daily with dinner or evening meal. 30 tablet 0  Not Taking    [DISCONTINUED] thiamine 100 MG tablet Take 1 tablet (100 mg total) by mouth once daily.   Taking       Objective:     Vital Signs (Most Recent):  Temp: 98 °F (36.7 °C) (12/17/17 1100)  Pulse: 101 (12/17/17 1126)  Resp: (!) 29 (12/17/17 1126)  BP: 111/67 (12/16/17 0315)  SpO2: (!) 93 % (12/17/17 1126) Vital Signs (24h Range):  Temp:  [97.7 °F (36.5 °C)-98.1 °F (36.7 °C)] 98 °F (36.7 °C)  Pulse:  [] 101  Resp:  [7-47] 29  SpO2:  [84 %-100 %] 93 %  Arterial Line BP: ()/(46-72) 91/48     Weight: 126 kg (277 lb 12.5 oz) (12/16/17 0315)  Body mass index is 35.66 kg/m².    Physical Exam   Constitutional: He is oriented to person, place, and time. No distress.   HENT:   Head: Normocephalic and atraumatic.   Eyes: No scleral icterus.   Neck: Normal range of motion.   Cardiovascular:   Irregularly irregular rhythm    Pulmonary/Chest:   Decreased breath sounds bilaterally (?body habitus) on inhaled NO   Abdominal: He exhibits no distension and no mass. There is no tenderness. There is no rebound and no guarding. No hernia.   Musculoskeletal:   +lower extremity edema, lower extremity cold to touch, spot where pulses can be heard with doppler have been marked.   Neurological: He is alert and oriented to person, place, and time.   Skin: He is not diaphoretic.       MELD-Na score: 40 at 12/17/2017  5:27 AM  MELD score: 40 at 12/17/2017  5:27 AM  Calculated from:  Serum Creatinine: 5.7 mg/dL (Rounded to 4) at 12/17/2017  5:27 AM  Serum Sodium: 124 mmol/L (Rounded to 125) at 12/17/2017  5:27 AM  Total Bilirubin: 4.5 mg/dL at 12/17/2017  5:27 AM  INR(ratio): 3.6 at 12/17/2017  5:27 AM  Age: 52 years    Significant Labs:  CBC:   Recent Labs  Lab 12/17/17 0527   WBC 11.17   RBC 4.80   HGB 14.1   HCT 42.5   *     CMP:   Recent Labs  Lab 12/17/17 0527   *   CALCIUM 5.7*   ALBUMIN 2.5*   PROT 5.7*   *   K 4.6   CO2 19*   CL 88*   BUN 47*   CREATININE 5.7*   ALKPHOS 208*   ALT 3,493*   AST  7,771*   BILITOT 4.5*     Coagulation:   Recent Labs  Lab 12/16/17  2257 12/17/17  0527   INR 5.6* 3.6*   APTT 36.1*  --        Significant Imaging:  None    Assessment/Plan:     Ischemic hepatitis (Shock Liver)    52 year old male with a history of HTN, CHF (10-15%), DVT/PE (not on AC on admission), CVA and alcohol abuse currently admitted in cardiogenic shock. GI consulted for acute liver failure/shock.    Based on poor heart function, needs for pressors, as well as acute rise at one point greater than 20K his current acute liver injury (not failure as he is mentation well) is most likely ischemic in nature. Although patient has a history of alcohol abuse and was on an Amio gtt these two things would not account of the degree of LFT elevation and how they are nicely trending down. Patient needs to recover from acute injury and once LFT normalize/level out we can determine the extent of liver injury from alcohol (as outpatient).    Recommendations:  -Continue supportive care per primary team  -US of the RUQ with doppler  -Follow up as outpatient with hepatology             Thank you for your consult. I will follow-up with patient. Please contact us if you have any additional questions.    Ange Chavez M.D.  Gastroenterology Fellow, PGY-IV  Pager: [phone]  [facility]

## 2017-12-17 NOTE — PROGRESS NOTES
Epic down most of night.  Failed lasix challenge. Dialysis catheter placed.  Will plan to start SLED this morning for volume removal to help respiratory acidosis and also for metabolic clearance to help metabolic acidosis.   Full consult note to follow.     Anastasia Candelaria, PGY-5  Nephrology Fellow  [facility]  Pager: [phone]

## 2017-12-17 NOTE — ASSESSMENT & PLAN NOTE
51 yo AAM with a past medical hx of Afib (on Xarelto), HTN, Alcoholic hepatitis, PE and hx of embolic stroke here presented to the [facility] as a transfer from [facility] for a higher level of care. He is in pAF and may likley go back into sinus. No anticoagulation currently as his INR is supratherapeutic.     Plan:  - Would recommend dig load (250 mcg x1) and then 62.5 mcg there after every 48 hrs.   - Agree with holding anticoagulation  - If needed could emergently DCCV patient with sedation however this is at the discretion of the primary team.     Discussed case with Dr. Nesbitt,

## 2017-12-17 NOTE — EICU
Called to bedside for line replacement.  Present are RNs and MD.    0750 - swan erica catheter removed from cordis at (R) fem by MD Mendoza  0755 - cordis to (R) fem exchanged per MD Mendoza over guidewire.  Sutured in place.    0806 - TLC placed into cordis over guidewire. Good blood return noted per all 3 ports.  0813 - IABP advanced per MD Mendoza.  MD states PCXR already ordered for placement confirmation.  Pt tolerated procedures well.

## 2017-12-17 NOTE — HPI
51 yo AAM with a past medical hx of Afib (on Xarelto), HTN, Alcoholic hepatitis, PE and hx of embolic stroke here presented to the [facility] as a transfer from [facility] for a higher level of care. The patient was originally admitted on 12/14th with ADHF and AFib with RVR. He was subsequently started on Diltiazem gtt. Afterwards he went into cardiogenic shock requiring an iABP placed in addition to the initiation of pressors of which he is currently on epinephrine and vasopressin at 0.09 and 0.04 respectively. The patient developed ischemic shock liver and ARF as a consequence of his cardiogenic shock. He has been in pAF there and here with one documented episode of atypical aflutter during his admission. Ep was consulted for recommendations for management of his pAF.    Currently the patient denies any cp, SOB, and reports some leg swelling.

## 2017-12-17 NOTE — HPI
Mr. Guo is a 53 yo AAM with HFrEF (previously 15%), HTN, T2DM, afib, alcoholic hepatitis, DVT/PE no longer on Xarelto (couldn't afford), and embolic stroke who presented to [facility] on 12/14 with BLE edema, TURNER, and cough. Symptoms started 2 weeks prior to presentation and have worsened over time. He had stopped taking his beta-blocker around the time of symptom onset. CTA was negative for PE. Workup was consistent with cardiogenic shock; IABP was placed and patient was transferred to [facility]. Patient was reportedly anuric at OSH and was evaluated by nephrology. Renal US was unremarkable. IVF were recommended (he received 1.5L NS in addition to continuous IVF) and dialysis was not offered due to poor candidacy. Upon transfer patient received lasix 80mg IV x1 without response. We also tried lasix 160mg IV; also without response. UOP only 125cc since arrival. He is net +2.7L/24h. He is currently receiving dopamine, epi, norepi, heparin, and nitric oxide infusions. He is on 4L NC. He has no prior h/o kidney disease. No h/o kidney stones or UTIs. Does report BC powder use about 2 weeks ago. He currently denies any SOB or chest pain. He does admit to feeling overwhelmed by everything that is going on. He is agreeable to proceeding with RRT to help with his cardiac status. Consent signed by his wife and placed in chart.

## 2017-12-17 NOTE — PLAN OF CARE
Problem: Patient Care Overview  Goal: Plan of Care Review  POC:  Pt remains very critical in cardiogenic shock and heart failure with Kidney failure due to shock. Dr Calderon was at bedside today to explain what is going on with pt, spouse, son, and daughter. MD explained to pt and family how sick he really is and how we are supporting his heart, kidneys and BP in hopes that he will start to improve as shock resolves.   Pt SWAN was removed this morning by Dr Mendoza with HTS. MD also advanced IABP and exchanged for a new Cordis to Right Fem. MD also placed TLC into Cordis via guidewire. No complications noted during procedure and all vitals stable.  Pt remains on Epi and Levophed DC'd with Vasopressin ordered in place of. Please keep MAP 65 and above. Titrating Epi appropriately.   Pt remains on Nitric @ 10.  Urine and Blood Cultures done this shift.   IABP: 1:1 Semi-Auto, Pressure. All pulses in LE's dopplered and marked.  CRRT started this shift with bloodflow of 150 UF of 300 . Goal 300 -400.  Heparin started slowly this shift due to increased INR. Order is non weight based order of 600 units/hr (6 ml/hr).  Critical Lab values called and all replaced this shift. See MAR.  Pt remains sleepy but oriented X4 and pleasant. Follows all commands.   GI: No BM this shift.   : Laura in place. Pt puts out 5-15 cc every few hours. Katherine urine, concentrated.  Skin: Pt is still cool but skin intact.  RR: 4 liters NC and Nitric. %  Cardio: IABP, Pressors.  Plan is to improve cardiogenic shock by continuing to support pt.  MD hopeful that kidneys will begin to recover and long term dialysis will not be needed.   Spouse, son, and daughter involved in POC and ask appropriate questions.  All questions answered and concerns addressed.   Will continue to monitor.

## 2017-12-17 NOTE — BRIEF OP NOTE
Waveform on swan was not looking appropriate  Diagnosis of CGS not in question  Procedures done in sterile fashion   Cleaned all lines with chlorhexadine  IABP on standby   Deflated balloon and pulled out swan  Restarted IABP  Advanced wire through cordis  Pulled out prior cordis as it was assumed to be clotted  Replaced with new cordis over wire  Advanced triple lumen MAC through Cordis  Secured in place  Flushed  Paused IABP once more  Unfastened balloon pump in place  Advanced IABP approx 2.5-3cm  Fastened in place proximally and distally  Repeat CXR ordered stat  Tegaderms placed and fashioned to cover all lines/ports but leave triple lumen free to use  Minimal blood loss < 5 cc  IABP confirmed in good position at gunner  No complications

## 2017-12-17 NOTE — ASSESSMENT & PLAN NOTE
- baseline sCr 0.9-1.1  - sCr 1.4 on admission and has continued to rise since. Up to 5.7 this morning. Also with anuria and metabolic acidosis. Failed lasix challenge.   - likely ischemic ATN from cardiogenic shock; currently on 3 pressors. Also received IV contrast on 12/14 which will likely delay renal recovery.   - markedly volume overloaded on exam. Na 124  - will start SCUF now. (SCUF > SLED to prevent overcorrection of Na). UF goal 300-400cc/hr as tolerated. Volume removal should help improve respiratory acidosis.   - please replete calcium  - will continue to trend labs. Low threshold to switch to SLED if metabolic acidosis worsens.

## 2017-12-17 NOTE — HPI
52 year old male with a history of HTN, CHF (10-15%), DVT/PE (not on AC on admission), CVA and alcohol abuse currently admitted in cardiogenic shock. GI consulted for acute liver failure/shock.    History provided by patient/wife/chart review.  Patient initially presented to OSH on 12/14/17 with increase shortness of breath as well as lower extremity edema. Patient was found to be in Afib with RVR therefore started of Diltiazem gtt and then switched to a Amio gtt. However patient worsened, requiring pressors (at one point was on Alfredo, Dopamine, and Levophed. Also developed GULSHAN and significant increase in LFT (amio and Lipitor stopped). Lastly he also underwent a RHC and placement of IABP at OSH.    Now on [facility] patient is on Epi, Vaso, and inhaled No. He is also on SLED as per nephrology.   In speaking to the wife patient had been drinking a pint of alcohol a day (unclear as exactly for how long this was or when his last drink was). He has never been diagnosed with cirrhosis or been followed by a hepatologist.

## 2017-12-17 NOTE — ASSESSMENT & PLAN NOTE
52 year old male with a history of HTN, CHF (10-15%), DVT/PE (not on AC on admission), CVA and alcohol abuse currently admitted in cardiogenic shock. GI consulted for acute liver failure/shock.    Based on poor heart function, needs for pressors, as well as acute rise at one point greater than 20K his current acute liver injury (not failure as he is mentation well) is most likely ischemic in nature. Although patient has a history of alcohol abuse and was on an Amio gtt these two things would not account of the degree of LFT elevation and how they are nicely trending down. Patient needs to recover from acute injury and once LFT normalize/level out we can determine the extent of liver injury from alcohol (as outpatient).    Recommendations:  -Continue supportive care per primary team  -US of the RUQ with doppler  -Follow up as outpatient with hepatology

## 2017-12-17 NOTE — PROCEDURES
"Altaf Guo is a 52 y.o. male patient.    Temp: 98.1 °F (36.7 °C) (12/17/17 0700)  Pulse: (!) 136 (12/17/17 0900)  Resp: (!) 32 (12/17/17 0900)  BP: 111/67 (12/16/17 0315)  SpO2: (!) 93 % (12/17/17 0900)  Weight: 126 kg (277 lb 12.5 oz) (12/16/17 0315)  Height: 6' 2" (188 cm) (12/14/17 0728)       Central Line  Date/Time: 12/17/2017 3:00 AM  Location procedure was performed: [facility] ([facility])  Performed by: WILLIAM WAITE  Pre-operative Diagnosis: cardiogenic shock, renal failure  Post-operative diagnosis: same  Consent Done: Yes  Time out: Immediately prior to procedure a "time out" was called to verify the correct patient, procedure, equipment, support staff and site/side marked as required.  Indications: hemodialysis and hemodynamic monitoring  Anesthesia: local infiltration    Anesthesia:  Local Anesthetic: lidocaine 1% without epinephrine  Anesthetic total: 5 mL  Preparation: skin prepped with ChloraPrep  Skin prep agent dried: skin prep agent completely dried prior to procedure  Sterile barriers: all five maximum sterile barriers used - cap, mask, sterile gown, sterile gloves, and large sterile sheet  Hand hygiene: hand hygiene performed prior to central venous catheter insertion  Location details: right internal jugular  Catheter type: dialysis  Ultrasound guidance: yes  Vessel Caliber: large, patent, compressibility normal  Vascular Doppler: not done  Needle advanced into vessel with real time Ultrasound guidance.  Guidewire confirmed in vessel.  Sterile sheath used.  Manometry: Yes  Number of attempts: 1  Assessment: placement verified by x-ray  Complications: none  Estimated blood loss (mL): 10  Post-procedure: line sutured,  chlorhexidine patch,  sterile dressing applied and blood return through all ports  Complications: No          William aWite  12/17/2017     I agree with Dr Waite's note as documented above  "

## 2017-12-17 NOTE — ASSESSMENT & PLAN NOTE
Discontinue lovenox-start heparin  Size of PE seems small to be causing his hypotension, more likely d/t acute CHF.

## 2017-12-17 NOTE — H&P
"[facility]  Cardiology  History and Physical     Patient Name: Altaf Guo  MRN: [id number]  Admission Date: 12/14/2017  Code Status: Full Code   Attending Provider: Braydon Peña MD   Primary Care Physician: [facility]  Principal Problem:Cardiogenic shock    Patient information was obtained from spouse/SO, past medical records and ER records.     Subjective:     Chief Complaint:  Cardiogenic shock     HPI:  Pt is a 52 y.o. male smoker (0.5 ppd) w/ a-fib,HTN, alcoholic hepatitis, and hx of long-term anticoagulant use 2nd/to DVT, PE, and embolic stroke, presenting to the ED c/o constant leg swelling (including R thigh) and worsening SOB on exertion x 1 week, as well as cough x 2 weeks. Pt states he has not taken Metoprolol in "a few days" because it "makes my stomach feel full and tight, really bloated."     EKG A-flutter 124 NSSTT changes    Previously followed by [facility]  Hx of cardiomyopathy EF 15% - presumed non-ischemic but unclear from old records  Patient denies prior MI or stent  Was on xarelto but states he stopped due to cost and takes plavix alone      Echo with EF 10-15 %    1 - Severely depressed left ventricular systolic function (EF 10-15%).     2 - Concentric hypertrophy.     3 - Mild left ventricular enlargement.     4 - Biatrial enlargement.     5 - Restrictive LV filling pattern, indicating markedly elevated LAP (grade 3 diastolic dysfunction).     6 - Right ventricular enlargement with moderately to severely depressed systolic function.     7 - The estimated PA systolic pressure is 37 mmHg.     8 - Mild mitral regurgitation.     9 - Moderate tricuspid regurgitation.     10 - Mild pulmonic regurgitation.     11 - Small pericardial effusion.       CT chest PE protocol  small emboli within the basal segmental branches of the right lower lobe, noting evaluation is limited by patient respiratory motion artifact. Clinical correlation advised. No large " central pulmonary embolus is apparent.    Focal consolidation in the right lung base. Differential considerations include pneumonia, aspiration, or pulmonary infarction.    Cardiomegaly with small pericardial effusion. Small right pleural effusion.    Mild diffuse bronchial wall thickening.    Nodular thickening of the adrenal glands.     RHC   RA 32  RV 58/12/23  PA 58/28/42  PAWP 40    Patient had an IABP placed right groin for cardiogenic shock and was transferred to [facility] for further management.There has been difficulty obtaining pulses RLE-U/A arterial showed diffuse decrease in velocities due to poor CO but no arterial occulsion at [facility].Also he was noted to acute elevation in LFTs and worsening renal failure.    Past Medical History:   Diagnosis Date    Alcoholic hepatitis 3/27/2013    Anticoagulant long-term use     Atrial fibrillation 6/7/2016    CHF (congestive heart failure)     DVT (deep venous thrombosis)     Embolic stroke involving right middle cerebral artery 6/6/2016    Embolic stroke involving right middle cerebral artery 6/6/2016    Enlarged LA (left atrium) 6/7/2016    Hypertension     Persistent atrial fibrillation 8/9/2014    Pulmonary embolism        History reviewed. No pertinent surgical history.    Review of patient's allergies indicates:  No Known Allergies    No current facility-administered medications on file prior to encounter.      Current Outpatient Prescriptions on File Prior to Encounter   Medication Sig    acetaminophen (TYLENOL) 325 MG tablet Take 2 tablets (650 mg total) by mouth every 6 (six) hours as needed for Temperature greater than (100.4).    amlodipine (NORVASC) 10 MG tablet Take 10 mg by mouth once daily.    clopidogrel (PLAVIX) 75 mg tablet Take 75 mg by mouth once daily.    folic acid (FOLVITE) 1 MG tablet Take 1 tablet (1 mg total) by mouth once daily.    FUROSEMIDE (LASIX ORAL) Take 40 mg by mouth once daily.     metoprolol tartrate  (LOPRESSOR) 100 MG tablet Take 1 tablet (100 mg total) by mouth 2 (two) times daily.    atorvastatin (LIPITOR) 40 MG tablet Take 1 tablet (40 mg total) by mouth once daily.    nicotine (NICODERM CQ) 21 mg/24 hr Place 1 patch onto the skin once daily.     Family History     Problem Relation (Age of Onset)    Arthritis Mother, Maternal Grandmother    Diabetes Maternal Grandmother, Maternal Grandfather    Kidney disease Mother        Social History Main Topics    Smoking status: Current Every Day Smoker     Packs/day: 0.50     Years: 26.00     Types: Cigarettes    Smokeless tobacco: Never Used    Alcohol use 1.8 oz/week     3 Shots of liquor per week      Comment: states quit 1 week ago    Drug use: Yes     Types: Marijuana    Sexual activity: Yes     Partners: Female     Review of Systems   Unable to perform ROS: acuity of condition     Objective:     Vital Signs (Most Recent):  Temp: 97.7 °F (36.5 °C) (12/16/17 2300)  Pulse: (!) 121 (12/17/17 0426)  Resp: (!) 28 (12/17/17 0426)  BP: 111/67 (12/16/17 0315)  SpO2: (!) 93 % (12/17/17 0426) Vital Signs (24h Range):  Temp:  [97.7 °F (36.5 °C)-98 °F (36.7 °C)] 97.7 °F (36.5 °C)  Pulse:  [] 121  Resp:  [7-47] 28  SpO2:  [84 %-100 %] 93 %  Arterial Line BP: ()/(50-71) 94/59     Weight: 126 kg (277 lb 12.5 oz)  Body mass index is 35.66 kg/m².    SpO2: (!) 93 %  O2 Device (Oxygen Therapy): nasal cannula w/ humidification      Intake/Output Summary (Last 24 hours) at 12/17/17 0444  Last data filed at 12/17/17 0100   Gross per 24 hour   Intake          2091.65 ml   Output              103 ml   Net          1988.65 ml       Lines/Drains/Airways     Central Venous Catheter Line                 Introducer right femoral -- days         Pulmonary Artery Catheter Assessment  right femoral -- days          Drain                 Urethral Catheter 12/15/17 1100 Coude 14 Fr. 1 day          Arterial Line                 Arterial Line 12/15/17 0830 Left Radial 1 day           Line                 IABP 12/15/17 1551 8.0 Fr. 50 mL 1 day          Peripheral Intravenous Line                 Peripheral IV - Single Lumen Right Upper Arm -- days         Peripheral IV - Single Lumen 12/14/17 0749 Right Antecubital 2 days         Peripheral IV - Single Lumen 12/14/17 0952 Right Forearm 2 days                Physical Exam   General: Drowsy but easily arousable, oriented  Eyes:PERRL.   Neck:+ JVD   Lungs:  clear to auscultation bilaterally  Cardiovascular: Heart: regular rate and rhythm, S1, S2 normal, no murmur, click, rub or gallop. + IABP hum  Extremities: no cyanosis or edema.   Pulses: LLE Pulses DP dopplearable, Right LE pulses unable to doppler, cool extremities.  Abdomen/Rectal: Abdomen: soft, non-tender non-distented; bowel sounds normal  Neurologic: Normal strength and tone. No focal numbness or weakness        Significant Labs:   CMP   Recent Labs  Lab 12/16/17  0303 12/16/17 2257   * 123*   K 5.6* 4.8   CL 92* 89*   CO2 13* 18*   * 140*   BUN 36* 46*   CREATININE 4.5* 5.7*   CALCIUM 6.4* 5.7*   PROT 6.4 5.8*   ALBUMIN 2.7* 2.5*   BILITOT 2.9* 4.1*   ALKPHOS 181* 216*   AST >20,000* 11,084*   ALT 5,648* 4,242*   ANIONGAP 21* 16   ESTGFRAFRICA 16* 12.3*   EGFRNONAA 14* 10.7*   , CBC   Recent Labs  Lab 12/16/17  0303 12/16/17 2257   WBC 16.86* 13.66*   HGB 16.4 15.1   HCT 50.3 45.5    172    and Troponin   Recent Labs  Lab 12/16/17 2257   TROPONINI 0.634*        Significant Imaging: Echocardiogram:   2D echo with color flow doppler:   Results for orders placed or performed during the hospital encounter of 12/14/17   2D echo with color flow doppler   Result Value Ref Range    EF 10 (A) 55 - 65    Mitral Valve Regurgitation MILD     Diastolic Dysfunction Yes (A)     Est. PA Systolic Pressure 37.09     Pericardial Effusion SMALL (A)     Tricuspid Valve Regurgitation MODERATE (A)      Assessment and Plan:     * Cardiogenic shock    IABP in place  Wean dopamine  followed by levophed  Start epi and Nitric oxide inhaled  IABP in place-Daily cxr  CVP, SVO2 monitoring  [place] with poor waveforms  Will attempt lasix 80 mg X 1 but if no response will consult nephro for HD  Unable to get LE pulses on Right-no acute occulusion on arterial US-Continue to monitor        ARF (acute renal failure)    Consult nephrology for CRRT if no response to lasix        Elevated LFTs    ?shock liver  Amio gtt dropped at [facility](was given for atrial fibrillation)    -continue to monitor  Hepatology consult        Acute pulmonary embolism    Discontinue lovenox-start heparin  Size of PE seems small to be causing his hypotension, more likely d/t acute CHF.        Atrial fibrillation    Continue heparin  Amiodarone discontinued at [facility]        History of medication noncompliance             History of pulmonary embolism             Chronic combined systolic and diastolic heart failure    Diuresis and afterload reduction as tolerated.  IABP in place.  Pt oliguric and creat rising, neph on board.  IABP and SGC placed yesterday (PAWP 40 mmHg)  Eventual cath will be needed to assess for cause of CMP (pt reports no prior cath).  Will hold off until creat normalizes.        Type 2 diabetes mellitus, controlled    ISS        Essential hypertension             Elevated troponin    On plavix likely due to h/o CVA      Alcoholism                 VTE Risk Mitigation         Ordered     enoxaparin injection 120 mg  Every 24 hours (non-standard times)     Route:  Subcutaneous        12/15/17 0838     Medium Risk of VTE  Once      12/14/17 1050     Place HENRIQUE hose  Until discontinued      12/14/17 1050     Place sequential compression device  Until discontinued      12/14/17 1050          William Waite MD  Cardiology   [facility]

## 2017-12-17 NOTE — ASSESSMENT & PLAN NOTE
- 2/2 hypervolemia  - SCUF as above to help with volume removal  - once Na closer to 130; will transition to SLED for metabolic clearance

## 2017-12-17 NOTE — CONSULTS
[facility]  Nephrology  Consult Note    Patient Name: Altaf Guo  MRN: [id number]  Admission Date: 12/14/2017  Hospital Length of Stay: 3 days  Attending Provider: Braydon Peña MD   Primary Care Physician: [facility]  Principal Problem:Cardiogenic shock    Inpatient consult to Nephrology  Consult performed by: SANDY POWELL  Consult ordered by: ISAC CARRILLO  Reason for consult: anuric GULSHAN; cardiogenic shock        Subjective:     HPI: Mr. Guo is a 53 yo AAM with HFrEF (previously 15%), HTN, T2DM, afib, alcoholic hepatitis, DVT/PE no longer on Xarelto (couldn't afford), and embolic stroke who presented to [facility] on 12/14 with BLE edema, TURNER, and cough. Symptoms started 2 weeks prior to presentation and have worsened over time. He had stopped taking his beta-blocker around the time of symptom onset. CTA was negative for PE. Workup was consistent with cardiogenic shock; IABP was placed and patient was transferred to [facility]. Patient was reportedly anuric at OSH and was evaluated by nephrology. Renal US was unremarkable. IVF were recommended (he received 1.5L NS in addition to continuous IVF) and dialysis was not offered due to poor candidacy. Upon transfer patient received lasix 80mg IV x1 without response. We also tried lasix 160mg IV; also without response. UOP only 125cc since arrival. He is net +2.7L/24h. He is currently receiving dopamine, epi, norepi, heparin, and nitric oxide infusions. He is on 4L NC. He has no prior h/o kidney disease. No h/o kidney stones or UTIs. Does report BC powder use about 2 weeks ago. He currently denies any SOB or chest pain. He does admit to feeling overwhelmed by everything that is going on. He is agreeable to proceeding with RRT to help with his cardiac status. Consent signed by his wife and placed in chart.     Past Medical History:   Diagnosis Date    Alcoholic hepatitis 3/27/2013    Anticoagulant long-term use      Atrial fibrillation 6/7/2016    CHF (congestive heart failure)     DVT (deep venous thrombosis)     Embolic stroke involving right middle cerebral artery 6/6/2016    Embolic stroke involving right middle cerebral artery 6/6/2016    Enlarged LA (left atrium) 6/7/2016    Hypertension     Persistent atrial fibrillation 8/9/2014    Pulmonary embolism        History reviewed. No pertinent surgical history.    Review of patient's allergies indicates:  No Known Allergies  Current Facility-Administered Medications   Medication Frequency    0.9%  NaCl infusion (CRRT USE ONLY) PRN    0.9%  NaCl infusion (for blood administration) Q24H PRN    acetaminophen tablet 650 mg Q4H PRN    atropine injection 0.5 mg PRN    calcium gluconate 1 g in dextrose 5 % 100 mL IVPB (premix) Once    clopidogrel tablet 75 mg Daily    dextrose 50% injection 12.5 g PRN    dextrose 50% injection 25 g PRN    diazePAM tablet 5 mg Q8H    DOPamine 400 mg in dextrose 5 % 250 mL infusion (premix) Continuous    EPINEPHrine (ADRENALIN) 4 mg in sodium chloride 0.9% 250 mL infusion Continuous    folic acid tablet 1 mg Daily    glucagon (human recombinant) injection 1 mg PRN    glucose chewable tablet 16 g PRN    glucose chewable tablet 24 g PRN    heparin 25,000 units in dextrose 5% 250 mL (100 units/mL) bolus from bag; ADDITIONAL PRN BOLUS PRN    heparin 25,000 units in dextrose 5% 250 mL (100 units/mL) bolus from bag; ADDITIONAL PRN BOLUS PRN    heparin 25,000 units in dextrose 5% 250 mL (100 units/mL) infusion Continuous    hydrocodone-acetaminophen 5-325mg per tablet 1 tablet Q4H PRN    insulin aspart pen 1-10 Units QID (AC + HS) PRN    lorazepam injection 1 mg Q4H PRN    magnesium sulfate 2g in water 50mL IVPB (premix) Once    magnesium sulfate 2g in water 50mL IVPB (premix) PRN    morphine injection 2 mg Q10 Min PRN    multivitamin tablet 1 tablet Daily    nitric oxide gas Gas 10 ppm Continuous    norepinephrine 16  mg in dextrose 5 % 250 mL infusion Continuous    ondansetron injection 4 mg Q8H PRN    ondansetron injection 4 mg Q12H PRN    oxyCODONE immediate release tablet 5 mg Q4H PRN    senna-docusate 8.6-50 mg per tablet 1 tablet BID    sodium chloride 0.9% bolus 250 mL PRN    sodium chloride 0.9% flush 3 mL Q8H    thiamine tablet 100 mg Daily    vasopressin (PITRESSIN) 0.2 Units/mL in dextrose 5 % 100 mL infusion Continuous     Family History     Problem Relation (Age of Onset)    Arthritis Mother, Maternal Grandmother    Diabetes Maternal Grandmother, Maternal Grandfather    Kidney disease Mother        Social History Main Topics    Smoking status: Current Every Day Smoker     Packs/day: 0.50     Years: 26.00     Types: Cigarettes    Smokeless tobacco: Never Used    Alcohol use 1.8 oz/week     3 Shots of liquor per week      Comment: states quit 1 week ago    Drug use: Yes     Types: Marijuana    Sexual activity: Yes     Partners: Female     Review of Systems   Respiratory: Negative for shortness of breath.    Cardiovascular: Negative for chest pain.   Genitourinary: Positive for decreased urine volume.   All other systems reviewed and are negative.    Objective:     Vital Signs (Most Recent):  Temp: 98.1 °F (36.7 °C) (12/17/17 0700)  Pulse: (!) 153 (12/17/17 0721)  Resp: (!) 31 (12/17/17 0721)  BP: 111/67 (12/16/17 0315)  SpO2: (!) 91 % (12/17/17 0721)  O2 Device (Oxygen Therapy): nasal cannula w/ humidification (12/17/17 0721) Vital Signs (24h Range):  Temp:  [97.7 °F (36.5 °C)-98.1 °F (36.7 °C)] 98.1 °F (36.7 °C)  Pulse:  [] 153  Resp:  [7-47] 31  SpO2:  [84 %-100 %] 91 %  Arterial Line BP: ()/(50-72) 85/52     Weight: 126 kg (277 lb 12.5 oz) (12/16/17 0315)  Body mass index is 35.66 kg/m².  Body surface area is 2.56 meters squared.    I/O last 3 completed shifts:  In: 4761.4 [P.O.:120; I.V.:3978.4; Blood:563; IV Piggyback:100]  Out: 148 [Urine:148]    Physical Exam   Constitutional: He is  oriented to person, place, and time. He appears well-developed and well-nourished.   HENT:   Head: Normocephalic and atraumatic.   Eyes: Conjunctivae and EOM are normal.   Neck: Neck supple. No tracheal deviation present. No thyromegaly present.   Cardiovascular: Tachycardia present.    Pulmonary/Chest: Effort normal. He has wheezes. He has rhonchi.   Abdominal: Soft. There is no tenderness. There is no guarding.   Musculoskeletal: He exhibits edema (2+). He exhibits no deformity.   Lymphadenopathy:     He has no cervical adenopathy.   Neurological: He is alert and oriented to person, place, and time.   Skin: Skin is warm and dry. He is not diaphoretic.   Psychiatric: His behavior is normal. Judgment normal.       Significant Labs:  ABGs:   Recent Labs  Lab 12/17/17  0426   PH 7.259*   PCO2 46.7*   HCO3 20.9*   POCSATURATED 94*   BE -6     CBC:   Recent Labs  Lab 12/17/17  0527   WBC 11.17   RBC 4.80   HGB 14.1   HCT 42.5   *   MCV 89   MCH 29.4   MCHC 33.2     CMP:   Recent Labs  Lab 12/17/17  0527   *   CALCIUM 5.7*   ALBUMIN 2.5*   PROT 5.7*   *   K 4.6   CO2 19*   CL 88*   BUN 47*   CREATININE 5.7*   ALKPHOS 208*   ALT 3,493*   AST 7,771*   BILITOT 4.5*     Coagulation:   Recent Labs  Lab 12/16/17  2257 12/17/17  0527   INR 5.6* 3.6*   APTT 36.1*  --      All labs within the past 24 hours have been reviewed.    Significant Imaging:  Labs: Reviewed  X-Ray: Reviewed  Echo: Reviewed    Assessment/Plan:     ARF (acute renal failure)    - baseline sCr 0.9-1.1  - sCr 1.4 on admission and has continued to rise since. Up to 5.7 this morning. Also with anuria and metabolic acidosis. Failed lasix challenge.   - likely ischemic ATN from cardiogenic shock; currently on 3 pressors. Also received IV contrast on 12/14 which will likely delay renal recovery.   - markedly volume overloaded on exam. Na 124  - will start SCUF now. (SCUF > SLED to prevent overcorrection of Na). UF goal 300-400cc/hr as tolerated.  Volume removal should help improve respiratory acidosis.   - please replete calcium  - will continue to trend labs. Low threshold to switch to SLED if metabolic acidosis worsens.         Hyponatremia    - 2/2 hypervolemia  - SCUF as above to help with volume removal  - once Na closer to 130; will transition to SLED for metabolic clearance            Anastasia Candelaria, PGY-5  Nephrology Fellow  [facility]  Pager: [phone]

## 2017-12-17 NOTE — ASSESSMENT & PLAN NOTE
IABP in place  Wean dopamine followed by levophed  Start epi and Nitric oxide inhaled  IABP in place-Daily cxr  CVP, SVO2 monitoring  [place] with poor waveforms  Will attempt lasix 80 mg X 1 but if no response will consult nephro for HD  Unable to get LE pulses on Right-no acute occulusion on arterial US-Continue to monitor

## 2017-12-17 NOTE — SUBJECTIVE & OBJECTIVE
Past Medical History:   Diagnosis Date    Alcoholic hepatitis 3/27/2013    Anticoagulant long-term use     Atrial fibrillation 6/7/2016    CHF (congestive heart failure)     DVT (deep venous thrombosis)     Embolic stroke involving right middle cerebral artery 6/6/2016    Embolic stroke involving right middle cerebral artery 6/6/2016    Enlarged LA (left atrium) 6/7/2016    Hypertension     Persistent atrial fibrillation 8/9/2014    Pulmonary embolism        History reviewed. No pertinent surgical history.    Review of patient's allergies indicates:  No Known Allergies  Current Facility-Administered Medications   Medication Frequency    0.9%  NaCl infusion (CRRT USE ONLY) PRN    0.9%  NaCl infusion (for blood administration) Q24H PRN    acetaminophen tablet 650 mg Q4H PRN    atropine injection 0.5 mg PRN    calcium gluconate 1 g in dextrose 5 % 100 mL IVPB (premix) Once    clopidogrel tablet 75 mg Daily    dextrose 50% injection 12.5 g PRN    dextrose 50% injection 25 g PRN    diazePAM tablet 5 mg Q8H    DOPamine 400 mg in dextrose 5 % 250 mL infusion (premix) Continuous    EPINEPHrine (ADRENALIN) 4 mg in sodium chloride 0.9% 250 mL infusion Continuous    folic acid tablet 1 mg Daily    glucagon (human recombinant) injection 1 mg PRN    glucose chewable tablet 16 g PRN    glucose chewable tablet 24 g PRN    heparin 25,000 units in dextrose 5% 250 mL (100 units/mL) bolus from bag; ADDITIONAL PRN BOLUS PRN    heparin 25,000 units in dextrose 5% 250 mL (100 units/mL) bolus from bag; ADDITIONAL PRN BOLUS PRN    heparin 25,000 units in dextrose 5% 250 mL (100 units/mL) infusion Continuous    hydrocodone-acetaminophen 5-325mg per tablet 1 tablet Q4H PRN    insulin aspart pen 1-10 Units QID (AC + HS) PRN    lorazepam injection 1 mg Q4H PRN    magnesium sulfate 2g in water 50mL IVPB (premix) Once    magnesium sulfate 2g in water 50mL IVPB (premix) PRN    morphine injection 2 mg Q10 Min PRN     multivitamin tablet 1 tablet Daily    nitric oxide gas Gas 10 ppm Continuous    norepinephrine 16 mg in dextrose 5 % 250 mL infusion Continuous    ondansetron injection 4 mg Q8H PRN    ondansetron injection 4 mg Q12H PRN    oxyCODONE immediate release tablet 5 mg Q4H PRN    senna-docusate 8.6-50 mg per tablet 1 tablet BID    sodium chloride 0.9% bolus 250 mL PRN    sodium chloride 0.9% flush 3 mL Q8H    thiamine tablet 100 mg Daily    vasopressin (PITRESSIN) 0.2 Units/mL in dextrose 5 % 100 mL infusion Continuous     Family History     Problem Relation (Age of Onset)    Arthritis Mother, Maternal Grandmother    Diabetes Maternal Grandmother, Maternal Grandfather    Kidney disease Mother        Social History Main Topics    Smoking status: Current Every Day Smoker     Packs/day: 0.50     Years: 26.00     Types: Cigarettes    Smokeless tobacco: Never Used    Alcohol use 1.8 oz/week     3 Shots of liquor per week      Comment: states quit 1 week ago    Drug use: Yes     Types: Marijuana    Sexual activity: Yes     Partners: Female     Review of Systems   Respiratory: Negative for shortness of breath.    Cardiovascular: Negative for chest pain.   Genitourinary: Positive for decreased urine volume.   All other systems reviewed and are negative.    Objective:     Vital Signs (Most Recent):  Temp: 98.1 °F (36.7 °C) (12/17/17 0700)  Pulse: (!) 153 (12/17/17 0721)  Resp: (!) 31 (12/17/17 0721)  BP: 111/67 (12/16/17 0315)  SpO2: (!) 91 % (12/17/17 0721)  O2 Device (Oxygen Therapy): nasal cannula w/ humidification (12/17/17 0721) Vital Signs (24h Range):  Temp:  [97.7 °F (36.5 °C)-98.1 °F (36.7 °C)] 98.1 °F (36.7 °C)  Pulse:  [] 153  Resp:  [7-47] 31  SpO2:  [84 %-100 %] 91 %  Arterial Line BP: ()/(50-72) 85/52     Weight: 126 kg (277 lb 12.5 oz) (12/16/17 0315)  Body mass index is 35.66 kg/m².  Body surface area is 2.56 meters squared.    I/O last 3 completed shifts:  In: 4761.4 [P.O.:120;  I.V.:3978.4; Blood:563; IV Piggyback:100]  Out: 148 [Urine:148]    Physical Exam   Constitutional: He is oriented to person, place, and time. He appears well-developed and well-nourished.   HENT:   Head: Normocephalic and atraumatic.   Eyes: Conjunctivae and EOM are normal.   Neck: Neck supple. No tracheal deviation present. No thyromegaly present.   Cardiovascular: Tachycardia present.    Pulmonary/Chest: Effort normal. He has wheezes. He has rhonchi.   Abdominal: Soft. There is no tenderness. There is no guarding.   Musculoskeletal: He exhibits edema (2+). He exhibits no deformity.   Lymphadenopathy:     He has no cervical adenopathy.   Neurological: He is alert and oriented to person, place, and time.   Skin: Skin is warm and dry. He is not diaphoretic.   Psychiatric: His behavior is normal. Judgment normal.       Significant Labs:  ABGs:   Recent Labs  Lab 12/17/17  0426   PH 7.259*   PCO2 46.7*   HCO3 20.9*   POCSATURATED 94*   BE -6     CBC:   Recent Labs  Lab 12/17/17 0527   WBC 11.17   RBC 4.80   HGB 14.1   HCT 42.5   *   MCV 89   MCH 29.4   MCHC 33.2     CMP:   Recent Labs  Lab 12/17/17 0527   *   CALCIUM 5.7*   ALBUMIN 2.5*   PROT 5.7*   *   K 4.6   CO2 19*   CL 88*   BUN 47*   CREATININE 5.7*   ALKPHOS 208*   ALT 3,493*   AST 7,771*   BILITOT 4.5*     Coagulation:   Recent Labs  Lab 12/16/17  2257 12/17/17  0527   INR 5.6* 3.6*   APTT 36.1*  --      All labs within the past 24 hours have been reviewed.    Significant Imaging:  Labs: Reviewed  X-Ray: Reviewed  Echo: Reviewed

## 2017-12-17 NOTE — SUBJECTIVE & OBJECTIVE
Past Medical History:   Diagnosis Date    Alcoholic hepatitis 3/27/2013    Anticoagulant long-term use     Atrial fibrillation 6/7/2016    CHF (congestive heart failure)     DVT (deep venous thrombosis)     Embolic stroke involving right middle cerebral artery 6/6/2016    Embolic stroke involving right middle cerebral artery 6/6/2016    Enlarged LA (left atrium) 6/7/2016    Hypertension     Persistent atrial fibrillation 8/9/2014    Pulmonary embolism        History reviewed. No pertinent surgical history.    Review of patient's allergies indicates:  No Known Allergies    No current facility-administered medications on file prior to encounter.      Current Outpatient Prescriptions on File Prior to Encounter   Medication Sig    acetaminophen (TYLENOL) 325 MG tablet Take 2 tablets (650 mg total) by mouth every 6 (six) hours as needed for Temperature greater than (100.4).    amlodipine (NORVASC) 10 MG tablet Take 10 mg by mouth once daily.    clopidogrel (PLAVIX) 75 mg tablet Take 75 mg by mouth once daily.    folic acid (FOLVITE) 1 MG tablet Take 1 tablet (1 mg total) by mouth once daily.    FUROSEMIDE (LASIX ORAL) Take 40 mg by mouth once daily.     metoprolol tartrate (LOPRESSOR) 100 MG tablet Take 1 tablet (100 mg total) by mouth 2 (two) times daily.    atorvastatin (LIPITOR) 40 MG tablet Take 1 tablet (40 mg total) by mouth once daily.    nicotine (NICODERM CQ) 21 mg/24 hr Place 1 patch onto the skin once daily.     Family History     Problem Relation (Age of Onset)    Arthritis Mother, Maternal Grandmother    Diabetes Maternal Grandmother, Maternal Grandfather    Kidney disease Mother        Social History Main Topics    Smoking status: Current Every Day Smoker     Packs/day: 0.50     Years: 26.00     Types: Cigarettes    Smokeless tobacco: Never Used    Alcohol use 1.8 oz/week     3 Shots of liquor per week      Comment: states quit 1 week ago    Drug use: Yes     Types: Marijuana     Sexual activity: Yes     Partners: Female     Review of Systems   Unable to perform ROS: acuity of condition     Objective:     Vital Signs (Most Recent):  Temp: 97.7 °F (36.5 °C) (12/16/17 2300)  Pulse: (!) 121 (12/17/17 0426)  Resp: (!) 28 (12/17/17 0426)  BP: 111/67 (12/16/17 0315)  SpO2: (!) 93 % (12/17/17 0426) Vital Signs (24h Range):  Temp:  [97.7 °F (36.5 °C)-98 °F (36.7 °C)] 97.7 °F (36.5 °C)  Pulse:  [] 121  Resp:  [7-47] 28  SpO2:  [84 %-100 %] 93 %  Arterial Line BP: ()/(50-71) 94/59     Weight: 126 kg (277 lb 12.5 oz)  Body mass index is 35.66 kg/m².    SpO2: (!) 93 %  O2 Device (Oxygen Therapy): nasal cannula w/ humidification      Intake/Output Summary (Last 24 hours) at 12/17/17 0444  Last data filed at 12/17/17 0100   Gross per 24 hour   Intake          2091.65 ml   Output              103 ml   Net          1988.65 ml       Lines/Drains/Airways     Central Venous Catheter Line                 Introducer right femoral -- days         Pulmonary Artery Catheter Assessment  right femoral -- days          Drain                 Urethral Catheter 12/15/17 1100 Coude 14 Fr. 1 day          Arterial Line                 Arterial Line 12/15/17 0830 Left Radial 1 day          Line                 IABP 12/15/17 1551 8.0 Fr. 50 mL 1 day          Peripheral Intravenous Line                 Peripheral IV - Single Lumen Right Upper Arm -- days         Peripheral IV - Single Lumen 12/14/17 0749 Right Antecubital 2 days         Peripheral IV - Single Lumen 12/14/17 0952 Right Forearm 2 days                Physical Exam   General: Drowsy but easily arousable, oriented  Eyes:PERRL.   Neck:+ JVD   Lungs:  clear to auscultation bilaterally  Cardiovascular: Heart: regular rate and rhythm, S1, S2 normal, no murmur, click, rub or gallop. + IABP hum  Extremities: no cyanosis or edema.   Pulses: LLE Pulses DP dopplearable, Right LE pulses unable to doppler, cool extremities.  Abdomen/Rectal: Abdomen: soft,  non-tender non-distented; bowel sounds normal  Neurologic: Normal strength and tone. No focal numbness or weakness        Significant Labs:   CMP   Recent Labs  Lab 12/16/17  0303 12/16/17 2257   * 123*   K 5.6* 4.8   CL 92* 89*   CO2 13* 18*   * 140*   BUN 36* 46*   CREATININE 4.5* 5.7*   CALCIUM 6.4* 5.7*   PROT 6.4 5.8*   ALBUMIN 2.7* 2.5*   BILITOT 2.9* 4.1*   ALKPHOS 181* 216*   AST >20,000* 11,084*   ALT 5,648* 4,242*   ANIONGAP 21* 16   ESTGFRAFRICA 16* 12.3*   EGFRNONAA 14* 10.7*   , CBC   Recent Labs  Lab 12/16/17  0303 12/16/17 2257   WBC 16.86* 13.66*   HGB 16.4 15.1   HCT 50.3 45.5    172    and Troponin   Recent Labs  Lab 12/16/17 2257   TROPONINI 0.634*        Significant Imaging: Echocardiogram:   2D echo with color flow doppler:   Results for orders placed or performed during the hospital encounter of 12/14/17   2D echo with color flow doppler   Result Value Ref Range    EF 10 (A) 55 - 65    Mitral Valve Regurgitation MILD     Diastolic Dysfunction Yes (A)     Est. PA Systolic Pressure 37.09     Pericardial Effusion SMALL (A)     Tricuspid Valve Regurgitation MODERATE (A)

## 2017-12-18 PROBLEM — R78.81 BACTEREMIA: Status: ACTIVE | Noted: 2017-01-01

## 2017-12-18 PROBLEM — R09.89 CHEST CONGESTION: Status: ACTIVE | Noted: 2017-01-01

## 2017-12-18 PROBLEM — I48.0 PAROXYSMAL ATRIAL FIBRILLATION: Status: ACTIVE | Noted: 2017-01-01

## 2017-12-18 PROBLEM — D69.6 THROMBOCYTOPENIA: Status: ACTIVE | Noted: 2017-01-01

## 2017-12-18 PROBLEM — N17.1 ACUTE RENAL FAILURE WITH ACUTE CORTICAL NECROSIS: Status: ACTIVE | Noted: 2017-01-01

## 2017-12-18 NOTE — PROGRESS NOTES
RN contacted by lab to inform him of 1 set of blood cultures from 12/17/2017 growing gram + rods, and to report Ca of 6.8 mg/dL. After all AM labs resulted MD Guillaume contacted and results relayed. Lab results and trends, including blood counts relayed and discussed. MD Guillaume to relay decreasing plt count and blood culture results to primary team at sign out. MD orders calcium replacement. RN will relay lab trends to oncoming nurse as well.

## 2017-12-18 NOTE — PROGRESS NOTES
Pt displays decreasing vasopressor requirements. Epi titrated to .04. MD Guillaume contacted. VS trends, lab results, gtt titrations reviewed. MD recommends pausing vaso and assessing VS trends.

## 2017-12-18 NOTE — ASSESSMENT & PLAN NOTE
- Appreciate EP's help. Was in A fib with RVR until ~ 4 AM this morning, now in SR  - Continue Digoxin per rec  - INR 2.6. Heparin gtt D/C'd (see below)

## 2017-12-18 NOTE — ASSESSMENT & PLAN NOTE
51 yo AAM with a past medical hx of Afib (on Xarelto), HTN, Alcoholic hepatitis, PE and hx of embolic stroke here presented to the [facility] as a transfer from [facility] for a higher level of care. He is in NSR. INR is 2.6.     Plan:  - In NSR now. Would continue dig 125 mcg every 72 hours.  - Would restart coumadin to keep INR 2-3 when deemed fit by primary team.   - If needed could emergently DCCV patient with sedation however this is at the discretion of the primary team.     Staff attestation to follow,    Will sign off,

## 2017-12-18 NOTE — PROGRESS NOTES
Pt asleep. No family at bedside.  Provided red VAD education folder at bedside for when pt wakes or in the event family comes tonight.  Folder is to remain at bedside.

## 2017-12-18 NOTE — ASSESSMENT & PLAN NOTE
- Appreciate Hepatology's help. Most likely ischemic  - Total bili has climbed to 5.7. Transaminases are trending down  - Will get hepatic US with dopplers per rec  - Hepatology plans to determine baseline liver injury from ETOH abuse as outpatient

## 2017-12-18 NOTE — PROGRESS NOTES
[facility]  Cardiac Electrophysiology  Progress Note    Admission Date: 12/14/2017  Code Status: Full Code   Attending Physician: No att. providers found   Expected Discharge Date:   Principal Problem:Cardiogenic shock    Subjective:     Interval History:   Patient is now in NSR in  80's. CVP is 8 this morning. Started on CRRT. Still has evidence of shock liver. On iABP as well. Off of pressors currently. INR is 2.6 this morning.    Review of Systems   Constitution: Negative for chills and fever.   HENT: Negative for congestion and nosebleeds.    Eyes: Negative for pain.   Cardiovascular: Negative for chest pain, dyspnea on exertion, leg swelling and palpitations.   Respiratory: Negative for shortness of breath.    Musculoskeletal: Negative for arthritis and back pain.   Gastrointestinal: Negative for abdominal pain, nausea and vomiting.   Genitourinary: Negative for dysuria and hematuria.     Objective:     Vital Signs (Most Recent):  Temp: 97.7 °F (36.5 °C) (12/18/17 0700)  Pulse: 84 (12/18/17 0900)  Resp: (!) 29 (12/18/17 0900)  BP: 111/67 (12/16/17 0315)  SpO2: 96 % (12/18/17 0900) Vital Signs (24h Range):  Temp:  [97.5 °F (36.4 °C)-98.6 °F (37 °C)] 97.7 °F (36.5 °C)  Pulse:  [] 84  Resp:  [22-33] 29  SpO2:  [92 %-97 %] 96 %  Arterial Line BP: ()/(47-67) 114/50     Weight: 126 kg (277 lb 12.5 oz)  Body mass index is 35.66 kg/m².     SpO2: 96 %  O2 Device (Oxygen Therapy): nasal cannula w/ humidification    Physical Exam   Constitutional: He is oriented to person, place, and time. He appears well-developed and well-nourished.   HENT:   Head: Normocephalic and atraumatic.   Eyes: EOM are normal. Pupils are equal, round, and reactive to light.   Neck: Normal range of motion. Neck supple. JVD (CVP was 23) present.   Cardiovascular:   Tachycardic and irregular rhythm.    Pulmonary/Chest: Effort normal and breath sounds normal.   Abdominal: Soft. Bowel sounds are normal. He exhibits no  distension.   Musculoskeletal: He exhibits no edema.   Neurological: He is alert and oriented to person, place, and time. He has normal reflexes.       Significant Labs: EP:   Recent Labs  Lab 12/16/17  2257 12/17/17  0527  12/17/17  1702  12/17/17  2248 12/18/17  0301 12/18/17  0826   * 124*  < > 125*  --  126* 127*  127*  --    K 4.8 4.6  < > 4.5  --  4.4 4.1  4.1  --    CL 89* 88*  < > 91*  --  93* 95  95  --    CO2 18* 19*  < > 19*  --  23 22*  22*  --    * 120*  < > 122*  --  108 76  76  --    BUN 46* 47*  < > 47*  --  31* 25*  25*  --    CREATININE 5.7* 5.7*  < > 5.9*  --  4.3* 3.6*  3.6*  --    CALCIUM 5.7* 5.7*  < > 6.3*  --  6.6* 6.8*  6.8*  --    PROT 5.8* 5.7*  --   --   --   --  4.6*  --    ALBUMIN 2.5* 2.5*  < > 2.2*  --  2.1* 2.0*  2.0*  --    BILITOT 4.1* 4.5*  --   --   --   --  5.7*  --    ALKPHOS 216* 208*  --   --   --   --  192*  --    AST 11,084* 7,771*  --   --   --   --  2,945*  --    ALT 4,242* 3,493*  --   --   --   --  2,188*  --    ANIONGAP 16 17*  < > 15  --  10 10  10  --    ESTGFRAFRICA 12.3* 12.3*  < > 11.8*  --  17.3* 21.5*  21.5*  --    EGFRNONAA 10.7* 10.7*  < > 10.2*  --  15.0* 18.6*  18.6*  --    WBC 13.66* 11.17  --   --   --   --  7.40  --    HGB 15.1 14.1  --   --   --   --  12.4*  --    HCT 45.5 42.5  < >  --   < >  --  36.1* 33*    136*  --   --   --   --  93*  --    INR 5.6* 3.6*  --   --   --   --  2.6*  --    < > = values in this interval not displayed.    Significant Imaging: Echocardiogram:   2D echo with color flow doppler:   Results for orders placed or performed during the hospital encounter of 12/14/17   2D echo with color flow doppler   Result Value Ref Range    EF 10 (A) 55 - 65    Mitral Valve Regurgitation MILD     Diastolic Dysfunction Yes (A)     Est. PA Systolic Pressure 37.09     Pericardial Effusion SMALL (A)     Tricuspid Valve Regurgitation MODERATE (A)      Assessment and Plan:     Atrial fibrillation    53 yo AAM with a  past medical hx of Afib (on Xarelto), HTN, Alcoholic hepatitis, PE and hx of embolic stroke here presented to the [facility] as a transfer from [facility] for a higher level of care. He is in NSR. INR is 2.6.     Plan:  - In NSR now. Would continue dig 125 mcg every 72 hours.  - Would restart coumadin to keep INR 2-3 when deemed fit by primary team.   - If needed could emergently DCCV patient with sedation however this is at the discretion of the primary team.     Staff attestation to follow,    Will sign off,              Yoon Anthony MD  Cardiac Electrophysiology  [facility]

## 2017-12-18 NOTE — PROGRESS NOTES
[facility]  Nephrology  Progress Note    Patient Name: Altaf Guo  MRN: [id number]  Admission Date: 12/14/2017  Hospital Length of Stay: 4 days  Attending Provider: Tu Tong MD   Primary Care Physician: [facility] [place]  Principal Problem:Cardiogenic shock    Subjective:     HPI: Mr. Guo is a 53 yo AAM with HFrEF (previously 15%), HTN, T2DM, afib, alcoholic hepatitis, DVT/PE no longer on Xarelto (couldn't afford), and embolic stroke who presented to [facility] on 12/14 with BLE edema, TURNER, and cough. Symptoms started 2 weeks prior to presentation and have worsened over time. He had stopped taking his beta-blocker around the time of symptom onset. CTA was negative for PE. Workup was consistent with cardiogenic shock; IABP was placed and patient [place] [facility] [place] [facility] Patient was reportedly anuric at OSH and was evaluated by nephrology. Renal US was unremarkable. IVF were recommended (he received 1.5L NS in addition to continuous IVF) and dialysis was not offered due to poor candidacy. Upon transfer patient received lasix 80mg IV x1 without response. We also tried lasix 160mg IV; also without response. UOP only 125cc since arrival. He is net +2.7L/24h. He is currently receiving dopamine, epi, norepi, heparin, and nitric oxide infusions. He is on 4L NC. He has no prior h/o kidney disease. No h/o kidney stones or UTIs. Does report BC powder use about 2 weeks ago. He currently denies any SOB or chest pain. He does admit to feeling overwhelmed by everything that is going on. He is agreeable to proceeding with RRT to help with his cardiac status. Consent signed by his wife and placed in chart.     Interval History:  Continues on SLED this morning, tolerating well.  Net negative 1.3L/24h, electrolytes stable.  Remains anuric.    Review of patient's allergies indicates:  No Known Allergies  Current Facility-Administered Medications   Medication Frequency    0.9%  NaCl  infusion (CRRT USE ONLY) PRN    0.9%  NaCl infusion (for blood administration) Q24H PRN    acetaminophen tablet 650 mg Q4H PRN    albuterol-ipratropium 2.5mg-0.5mg/3mL nebulizer solution 3 mL Q6H    atropine injection 0.5 mg PRN    clopidogrel tablet 75 mg Daily    dextrose 50% injection 12.5 g PRN    dextrose 50% injection 25 g PRN    diazePAM tablet 5 mg Q8H    [START ON 12/20/2017] digoxin tablet 0.125 mg Q72H    EPINEPHrine (ADRENALIN) 4 mg in sodium chloride 0.9% 250 mL infusion Continuous    folic acid tablet 1 mg Daily    glucagon (human recombinant) injection 1 mg PRN    glucose chewable tablet 16 g PRN    glucose chewable tablet 24 g PRN    guaiFENesin 12 hr tablet 600 mg BID    hydrocodone-acetaminophen 5-325mg per tablet 1 tablet Q4H PRN    insulin aspart pen 1-10 Units QID (AC + HS) PRN    lorazepam injection 1 mg Q4H PRN    magnesium sulfate 2g in water 50mL IVPB (premix) PRN    morphine injection 2 mg Q10 Min PRN    multivitamin tablet 1 tablet Daily    nitric oxide gas Gas 5 ppm Continuous    ondansetron injection 4 mg Q8H PRN    ondansetron injection 4 mg Q12H PRN    oxyCODONE immediate release tablet 5 mg Q4H PRN    potassium, sodium phosphates 280-160-250 mg packet 2 packet PRN    potassium, sodium phosphates 280-160-250 mg packet 2 packet PRN    potassium, sodium phosphates 280-160-250 mg packet 2 packet PRN    senna-docusate 8.6-50 mg per tablet 1 tablet BID    sodium chloride 0.9% bolus 250 mL PRN    sodium chloride 0.9% flush 3 mL Q8H    thiamine tablet 100 mg Daily    vasopressin (PITRESSIN) 0.2 Units/mL in dextrose 5 % 100 mL infusion Continuous       Objective:     Vital Signs (Most Recent):  Temp: 97.7 °F (36.5 °C) (12/18/17 0700)  Pulse: 88 (12/18/17 1500)  Resp: (!) 30 (12/18/17 1500)  BP: 111/67 (12/16/17 0315)  SpO2: 96 % (12/18/17 1500)  O2 Device (Oxygen Therapy): nasal cannula w/ humidification (12/18/17 1121) Vital Signs (24h Range):  Temp:  [97.7 °F  (36.5 °C)-98.6 °F (37 °C)] 97.7 °F (36.5 °C)  Pulse:  [] 88  Resp:  [20-31] 30  SpO2:  [92 %-97 %] 96 %  Arterial Line BP: ()/(47-67) 105/51     Weight: 126 kg (277 lb 12.5 oz) (12/16/17 0315)  Body mass index is 35.66 kg/m².  Body surface area is 2.56 meters squared.    I/O last 3 completed shifts:  In: 8083.7 [P.O.:360; I.V.:6660.7; Blood:563; IV Piggyback:500]  Out: 8115 [Urine:265; Other:7850]    Physical Exam   Constitutional: He is oriented to person, place, and time. He appears well-developed and well-nourished.   HENT:   Head: Normocephalic and atraumatic.   Eyes: Conjunctivae and EOM are normal.   Neck: Neck supple. No tracheal deviation present. No thyromegaly present.   Cardiovascular: Tachycardia present.    Pulmonary/Chest: Effort normal. He has no wheezes. He has rhonchi.   Abdominal: Soft. There is no tenderness. There is no guarding.   Musculoskeletal: He exhibits edema (2+). He exhibits no deformity.   Lymphadenopathy:     He has no cervical adenopathy.   Neurological: He is alert and oriented to person, place, and time.   Skin: Skin is warm and dry. He is not diaphoretic.   Psychiatric: His behavior is normal. Judgment normal.       Significant Labs:  CBC:   Recent Labs  Lab 12/18/17  0301 12/18/17  0826   WBC 7.40  --    RBC 4.13*  --    HGB 12.4*  --    HCT 36.1* 33*   PLT 93*  --    MCV 87  --    MCH 30.0  --    MCHC 34.3  --      CMP:   Recent Labs  Lab 12/18/17  0301   GLU 76  76   CALCIUM 6.8*  6.8*   ALBUMIN 2.0*  2.0*   PROT 4.6*   *  127*   K 4.1  4.1   CO2 22*  22*   CL 95  95   BUN 25*  25*   CREATININE 3.6*  3.6*   ALKPHOS 192*   ALT 2,188*   AST 2,945*   BILITOT 5.7*          Assessment/Plan:     ARF (acute renal failure)    - baseline sCr 0.9-1.1  - likely ischemic ATN from cardiogenic shock. Also received IV contrast on 12/14 which will likely delay renal recovery.     -Continues on SLED this morning and tolerating well.  Sodium 127 this morning,  hypocalcemia at 8.4.  -will continue SLED. Will increase Sodium bath to 135.  Increase calcium bath to 3.  -UF goal 350-400 cc/hr as tolerated.          Yung Dwyer NP  Nephrology  [facility]  Pager:  [phone]

## 2017-12-18 NOTE — PROGRESS NOTES
attempted to see pt/family in order to assess needs given admission due to cardiogenic shock.  Per pt's nurse the pt's spouse has returned home due to need for her own MD visit.  Pt's daughters may be in later today. Transplant  will follow to complete assessment.

## 2017-12-18 NOTE — SUBJECTIVE & OBJECTIVE
Interval History:  Continues on SLED this morning, tolerating well.  Net negative 1.3L/24h, electrolytes stable.  Remains anuric.    Review of patient's allergies indicates:  No Known Allergies  Current Facility-Administered Medications   Medication Frequency    0.9%  NaCl infusion (CRRT USE ONLY) PRN    0.9%  NaCl infusion (for blood administration) Q24H PRN    acetaminophen tablet 650 mg Q4H PRN    albuterol-ipratropium 2.5mg-0.5mg/3mL nebulizer solution 3 mL Q6H    atropine injection 0.5 mg PRN    clopidogrel tablet 75 mg Daily    dextrose 50% injection 12.5 g PRN    dextrose 50% injection 25 g PRN    diazePAM tablet 5 mg Q8H    [START ON 12/20/2017] digoxin tablet 0.125 mg Q72H    EPINEPHrine (ADRENALIN) 4 mg in sodium chloride 0.9% 250 mL infusion Continuous    folic acid tablet 1 mg Daily    glucagon (human recombinant) injection 1 mg PRN    glucose chewable tablet 16 g PRN    glucose chewable tablet 24 g PRN    guaiFENesin 12 hr tablet 600 mg BID    hydrocodone-acetaminophen 5-325mg per tablet 1 tablet Q4H PRN    insulin aspart pen 1-10 Units QID (AC + HS) PRN    lorazepam injection 1 mg Q4H PRN    magnesium sulfate 2g in water 50mL IVPB (premix) PRN    morphine injection 2 mg Q10 Min PRN    multivitamin tablet 1 tablet Daily    nitric oxide gas Gas 5 ppm Continuous    ondansetron injection 4 mg Q8H PRN    ondansetron injection 4 mg Q12H PRN    oxyCODONE immediate release tablet 5 mg Q4H PRN    potassium, sodium phosphates 280-160-250 mg packet 2 packet PRN    potassium, sodium phosphates 280-160-250 mg packet 2 packet PRN    potassium, sodium phosphates 280-160-250 mg packet 2 packet PRN    senna-docusate 8.6-50 mg per tablet 1 tablet BID    sodium chloride 0.9% bolus 250 mL PRN    sodium chloride 0.9% flush 3 mL Q8H    thiamine tablet 100 mg Daily    vasopressin (PITRESSIN) 0.2 Units/mL in dextrose 5 % 100 mL infusion Continuous       Objective:     Vital Signs (Most  Recent):  Temp: 97.7 °F (36.5 °C) (12/18/17 0700)  Pulse: 88 (12/18/17 1500)  Resp: (!) 30 (12/18/17 1500)  BP: 111/67 (12/16/17 0315)  SpO2: 96 % (12/18/17 1500)  O2 Device (Oxygen Therapy): nasal cannula w/ humidification (12/18/17 1121) Vital Signs (24h Range):  Temp:  [97.7 °F (36.5 °C)-98.6 °F (37 °C)] 97.7 °F (36.5 °C)  Pulse:  [] 88  Resp:  [20-31] 30  SpO2:  [92 %-97 %] 96 %  Arterial Line BP: ()/(47-67) 105/51     Weight: 126 kg (277 lb 12.5 oz) (12/16/17 0315)  Body mass index is 35.66 kg/m².  Body surface area is 2.56 meters squared.    I/O last 3 completed shifts:  In: 8083.7 [P.O.:360; I.V.:6660.7; Blood:563; IV Piggyback:500]  Out: 8115 [Urine:265; Other:7850]    Physical Exam   Constitutional: He is oriented to person, place, and time. He appears well-developed and well-nourished.   HENT:   Head: Normocephalic and atraumatic.   Eyes: Conjunctivae and EOM are normal.   Neck: Neck supple. No tracheal deviation present. No thyromegaly present.   Cardiovascular: Tachycardia present.    Pulmonary/Chest: Effort normal. He has no wheezes. He has rhonchi.   Abdominal: Soft. There is no tenderness. There is no guarding.   Musculoskeletal: He exhibits edema (2+). He exhibits no deformity.   Lymphadenopathy:     He has no cervical adenopathy.   Neurological: He is alert and oriented to person, place, and time.   Skin: Skin is warm and dry. He is not diaphoretic.   Psychiatric: His behavior is normal. Judgment normal.       Significant Labs:  CBC:   Recent Labs  Lab 12/18/17  0301 12/18/17  0826   WBC 7.40  --    RBC 4.13*  --    HGB 12.4*  --    HCT 36.1* 33*   PLT 93*  --    MCV 87  --    MCH 30.0  --    MCHC 34.3  --      CMP:   Recent Labs  Lab 12/18/17  0301   GLU 76  76   CALCIUM 6.8*  6.8*   ALBUMIN 2.0*  2.0*   PROT 4.6*   *  127*   K 4.1  4.1   CO2 22*  22*   CL 95  95   BUN 25*  25*   CREATININE 3.6*  3.6*   ALKPHOS 192*   ALT 2,188*   AST 2,945*   BILITOT 5.7*

## 2017-12-18 NOTE — ASSESSMENT & PLAN NOTE
- Scattered coarse rhonchi bilaterally, sputum fairly clear. Focal consolidation RLL by CXR. Sputum culture with normal respiratory dwaine  - Aggressive pulmonary tiolet

## 2017-12-18 NOTE — ASSESSMENT & PLAN NOTE
- Afebrile and no leukocytosis, but cultures obtained in the setting of high sVO2. Blood cultures with GPR - ID consulted

## 2017-12-18 NOTE — HPI
History obtained by chart review    51 y/o male with HTN, CHF, DVT/PE (noncomplaint with anticoagulation), and alcohol abuse admitted for cardiogenic shock.   Patient initially presented to OSH on 12/14/17 with increase shortness of breath as well as lower extremity edema. Patient was found to be in Afib with RVR therefore started of Diltiazem gtt and then switched to a Amio gtt. However patient worsened, requiring pressors (at one point was on Alfredo, Dopamine, and Levophed. Also developed GULSHAN and significant increase in LFT . Lastly he also underwent a RHC and placement of IABP at OSH 12/15. He is also on SLED as per nephrology    We are consulted for GPR in blood 12/17. Pt remain afebrile, without a leukocytosis. No acute complaints today.

## 2017-12-18 NOTE — ASSESSMENT & PLAN NOTE
- H/O left PE 2014 - was on Xarelto but stopped taking. CTA chest 12/14/17 at [facility] showed possible small emboli within the basal segmental branches of the RLL (limited by motion artifact). No large central PE  - INR 2.6. Fixed dose Heparin D/C'd 2/2 drop in platelet count (see below)

## 2017-12-18 NOTE — ASSESSMENT & PLAN NOTE
- baseline sCr 0.9-1.1  - likely ischemic ATN from cardiogenic shock. Also received IV contrast on 12/14 which will likely delay renal recovery.     -Continues on SLED this morning and tolerating well.  Sodium 127 this morning, hypocalcemia at 8.4.  -will continue SLED. Will increase Sodium bath to 135.  Increase calcium bath to 3.  -UF goal 350-400 cc/hr as tolerated.

## 2017-12-18 NOTE — PROGRESS NOTES
[facility]  Heart Transplant  Progress Note    Patient Name: Altaf Guo  MRN: [id number]  Admission Date: 12/14/2017  Hospital Length of Stay: 4 days  Attending Physician: Tu Tong MD  Primary Care Provider: [facility]  Principal Problem:Cardiogenic shock    Subjective:     Interval History: Feels OK except for bilateral foot pain (has doppler pulses)    Continuous Infusions:   epinephrine infusion 0.02 mcg/kg/min (12/18/17 0800)    nitric oxide gas      vasopressin (PITRESSIN) infusion Stopped (12/18/17 0000)     Scheduled Meds:   albuterol-ipratropium 2.5mg-0.5mg/3mL  3 mL Nebulization Q4H    clopidogrel  75 mg Oral Daily    diazePAM  5 mg Oral Q8H    [START ON 12/20/2017] digoxin  0.125 mg Oral Q72H    folic acid  1 mg Oral Daily    multivitamin  1 tablet Oral Daily    senna-docusate 8.6-50 mg  1 tablet Oral BID    sodium chloride 0.9%  3 mL Intravenous Q8H    thiamine  100 mg Oral Daily     PRN Meds:sodium chloride 0.9%, sodium chloride, acetaminophen, atropine, dextrose 50%, dextrose 50%, glucagon (human recombinant), glucose, glucose, hydrocodone-acetaminophen 5-325mg, insulin aspart, lorazepam, magnesium sulfate IVPB, morphine, ondansetron, ondansetron, oxyCODONE, sodium chloride 0.9%    Review of patient's allergies indicates:  No Known Allergies  Objective:     Vital Signs (Most Recent):  Temp: 97.7 °F (36.5 °C) (12/18/17 0700)  Pulse: 83 (12/18/17 1000)  Resp: (!) 27 (12/18/17 1000)  BP: 111/67 (12/16/17 0315)  SpO2: 96 % (12/18/17 1036) Vital Signs (24h Range):  Temp:  [97.5 °F (36.4 °C)-98.6 °F (37 °C)] 97.7 °F (36.5 °C)  Pulse:  [] 83  Resp:  [22-30] 27  SpO2:  [92 %-97 %] 96 %  Arterial Line BP: ()/(47-67) 116/51     Patient Vitals for the past 72 hrs (Last 3 readings):   Weight   12/16/17 0315 126 kg (277 lb 12.5 oz)     Body mass index is 35.66 kg/m².      Intake/Output Summary (Last 24 hours) at 12/18/17 1058  Last data filed at  12/18/17 1000   Gross per 24 hour   Intake           6597.4 ml   Output             9008 ml   Net          -2410.6 ml       Hemodynamic Parameters:       Telemetry: Currently in SR    Physical Exam   Constitutional: He is oriented to person, place, and time. He appears well-developed and well-nourished.   HENT:   Head: Normocephalic and atraumatic.   Eyes: Conjunctivae and EOM are normal. Pupils are equal, round, and reactive to light.   Neck: Normal range of motion. Neck supple.   RIJ trialysis cath   Cardiovascular:   RRR except ectopi; + S3. Pedal pulses audible with doppler   Pulmonary/Chest: Effort normal.   Scattered coarse rhonchi   Abdominal: Soft. Bowel sounds are normal.   Musculoskeletal:   4+ edema mid back down; no cyanosis   Neurological: He is alert and oriented to person, place, and time.   Skin: Skin is warm and dry. Capillary refill takes 2 to 3 seconds.   Psychiatric: He has a normal mood and affect. His behavior is normal. Judgment and thought content normal.       Significant Labs:  CBC:    Recent Labs  Lab 12/16/17 2257 12/17/17 0527 12/17/17  2207 12/18/17  0301 12/18/17  0826   WBC 13.66* 11.17  --   --  7.40  --    RBC 5.08 4.80  --   --  4.13*  --    HGB 15.1 14.1  --   --  12.4*  --    HCT 45.5 42.5  < > 39 36.1* 33*    136*  --   --  93*  --    MCV 90 89  --   --  87  --    MCH 29.7 29.4  --   --  30.0  --    MCHC 33.2 33.2  --   --  34.3  --    < > = values in this interval not displayed.  BNP:    Recent Labs  Lab 12/14/17  0740 12/16/17 2257   * 671*     CMP:    Recent Labs  Lab 12/16/17 2257 12/17/17  0527  12/17/17  1702 12/17/17  2248 12/18/17  0301   * 120*  < > 122* 108 76  76   CALCIUM 5.7* 5.7*  < > 6.3* 6.6* 6.8*  6.8*   ALBUMIN 2.5* 2.5*  < > 2.2* 2.1* 2.0*  2.0*   PROT 5.8* 5.7*  --   --   --  4.6*   * 124*  < > 125* 126* 127*  127*   K 4.8 4.6  < > 4.5 4.4 4.1  4.1   CO2 18* 19*  < > 19* 23 22*  22*   CL 89* 88*  < > 91* 93* 95  95   BUN  46* 47*  < > 47* 31* 25*  25*   CREATININE 5.7* 5.7*  < > 5.9* 4.3* 3.6*  3.6*   ALKPHOS 216* 208*  --   --   --  192*   ALT 4,242* 3,493*  --   --   --  2,188*   AST 11,084* 7,771*  --   --   --  2,945*   BILITOT 4.1* 4.5*  --   --   --  5.7*   < > = values in this interval not displayed.   Coagulation:     Recent Labs  Lab 12/16/17  2257 12/17/17  0527 12/18/17  0301   INR 5.6* 3.6* 2.6*   APTT 36.1*  --  35.5*     LDH:  No results for input(s): LDH in the last 72 hours.  Microbiology:  Microbiology Results (last 7 days)     Procedure Component Value Units Date/Time    Culture, Respiratory with Gram Stain [[id number]] Collected:  12/17/17 1700    Order Status:  Completed Specimen:  Respiratory from Sputum Updated:  12/18/17 0729     Respiratory Culture Normal respiratory dwaine     Gram Stain (Respiratory) <10 epithelial cells per low power field.     Gram Stain (Respiratory) Moderate WBC's     Gram Stain (Respiratory) Moderate Gram positive cocci     Gram Stain (Respiratory) Many Gram negative diplococci     Gram Stain (Respiratory) Moderate Gram negative rods    Blood culture [[id number]] Collected:  12/17/17 1350    Order Status:  Completed Specimen:  Blood from Peripheral, Antecubital, Left Updated:  12/18/17 0453     Blood Culture, Routine Gram stain aer bottle: Gram positive rods      Blood Culture, Routine Gram stain pilo bottle: Gram positive rods      Blood Culture, Routine Results called to and read back by:Jama Palomino RN 12/18/2017  04:53    Blood culture [[id number]] Collected:  12/17/17 1531    Order Status:  Completed Specimen:  Blood from Peripheral, Wrist, Right Updated:  12/18/17 0115     Blood Culture, Routine No Growth to date    Urine culture [[id number]] Collected:  12/17/17 1318    Order Status:  Sent Specimen:  Urine from Urine, Catheterized Updated:  12/17/17 1348          I have reviewed all pertinent labs within the past 24 hours.    Estimated Creatinine Clearance: 34.6 mL/min (based on SCr of  3.6 mg/dL (H)).          Assessment and Plan:     53 yo AAM with cardiogenic shock transferred for further care- has longstanding chronic combined HF, NICM,tobacco and etOH use, prior CTED (DVT/PE), stroke- transferred with IABP, DA and NE- shock associated with acute liver and renal failure, AF with RVR    * Cardiogenic shock    - sVO2 80% (concern for sepsis - see below)  - Has been weaned off Epi and Vaso  - Will wean off Ashleigh  - Currently too sick for advanced options        Acute liver failure without hepatic coma    - Appreciate Hepatology's help. Most likely ischemic  - Total bili has climbed to 5.7. Transaminases are trending down  - Will get hepatic US with dopplers per rec  - Hepatology plans to determine baseline liver injury from ETOH abuse as outpatient        Bacteremia    - Afebrile and no leukocytosis, but cultures obtained in the setting of high sVO2. Blood cultures with GPR - ID consulted        ARF (acute renal failure)    - Nephrology following  - CVP 8  - Tolerating CRRT with           Atrial fibrillation    - Appreciate EP's help. Was in A fib with RVR until ~ 4 AM this morning, now in SR  - Continue Digoxin per rec  - INR 2.6. Heparin gtt D/C'd (see below)        Acute pulmonary embolism    - H/O left PE 2014 - was on Xarelto but stopped taking. CTA chest 12/14/17 at [facility] showed possible small emboli within the basal segmental branches of the RLL (limited by motion artifact). No large central PE  - INR 2.6. Fixed dose Heparin D/C'd 2/2 drop in platelet count (see below)        Chest congestion    - Scattered coarse rhonchi bilaterally, sputum fairly clear. Focal consolidation RLL by CXR. Sputum culture with normal respiratory dwaine  - Aggressive pulmonary tiolet        Thrombocytopenia    - Platelet count has dropped to 93K on fixed dose Heparin (down by more than half). Heparin gtt D/C'd, HIT/[place] [id number]  Heart Transplant  [facility]

## 2017-12-18 NOTE — CONSULTS
[facility]  Infectious Disease  Consult Note    Patient Name: Altaf Guo  MRN: [id number]  Admission Date: 12/14/2017  Hospital Length of Stay: 4 days  Attending Physician: Tu Tong MD  Primary Care Provider: [facility]     Isolation Status: No active isolations    Patient information was obtained from past medical records.      Consults  Assessment/Plan:     Bacteremia    52 year old male with a history of HTN, CHF,  DVT/PE, CVA and alcohol abuse currently transferred here with IABP placed 12/15, and in cardiogenic shock. We are consulted for GPR in blood 12/17.     Plan:  1. Will not start antibiotics at this time, suspect blood cultures 12/17 GPR is a likely a contaminant. UA with 20 WBCs, not complaining of any urinary symptoms. Respiratory culture with normal dwaine. Pt remain stable, afebrile here, leukocytosis resolved.     Seen and discussed with ID staff. Will sign off.                 Thank you for your consult. I will sign off. Please contact us if you have any additional questions.    Rajan Tejeda PA-C  Infectious Disease  [facility] Pgr [phone]    Subjective:     Principal Problem: Cardiogenic shock    HPI: History obtained by chart review    53 y/o male with HTN, CHF, DVT/PE (noncomplaint with anticoagulation), and alcohol abuse admitted for cardiogenic shock.   Patient initially presented to OSH on 12/14/17 with increase shortness of breath as well as lower extremity edema. Patient was found to be in Afib with RVR therefore started of Diltiazem gtt and then switched to a Amio gtt. However patient worsened, requiring pressors (at one point was on Alfredo, Dopamine, and Levophed. Also developed GULSHAN and significant increase in LFT . Lastly he also underwent a RHC and placement of IABP at OSH 12/15. He is also on SLED as per nephrology    We are consulted for GPR in blood 12/17. Pt remain afebrile, without a leukocytosis. No acute complaints  today.     Past Medical History:   Diagnosis Date    Alcoholic hepatitis 3/27/2013    Anticoagulant long-term use     Atrial fibrillation 6/7/2016    CHF (congestive heart failure)     DVT (deep venous thrombosis)     Embolic stroke involving right middle cerebral artery 6/6/2016    Embolic stroke involving right middle cerebral artery 6/6/2016    Enlarged LA (left atrium) 6/7/2016    Hypertension     Persistent atrial fibrillation 8/9/2014    Pulmonary embolism        History reviewed. No pertinent surgical history.    Review of patient's allergies indicates:  No Known Allergies    Medications:  Prescriptions Prior to Admission   Medication Sig    acetaminophen (TYLENOL) 325 MG tablet Take 2 tablets (650 mg total) by mouth every 6 (six) hours as needed for Temperature greater than (100.4).    amlodipine (NORVASC) 10 MG tablet Take 10 mg by mouth once daily.    clopidogrel (PLAVIX) 75 mg tablet Take 75 mg by mouth once daily.    folic acid (FOLVITE) 1 MG tablet Take 1 tablet (1 mg total) by mouth once daily.    FUROSEMIDE (LASIX ORAL) Take 40 mg by mouth once daily.     lisinopril (PRINIVIL,ZESTRIL) 20 MG tablet Take 20 mg by mouth once daily.    metFORMIN (GLUCOPHAGE) 500 MG tablet Take 500 mg by mouth 2 (two) times daily with meals.    metoprolol tartrate (LOPRESSOR) 100 MG tablet Take 1 tablet (100 mg total) by mouth 2 (two) times daily.    potassium chloride SA (K-DUR,KLOR-CON) 10 MEQ tablet Take 10 mEq by mouth once daily.    atorvastatin (LIPITOR) 40 MG tablet Take 1 tablet (40 mg total) by mouth once daily.    nicotine (NICODERM CQ) 21 mg/24 hr Place 1 patch onto the skin once daily.    [DISCONTINUED] rivaroxaban (XARELTO) 20 mg Tab Take 1 tablet (20 mg total) by mouth daily with dinner or evening meal.    [DISCONTINUED] thiamine 100 MG tablet Take 1 tablet (100 mg total) by mouth once daily.     Antibiotics     None        Antifungals     None        Antivirals     None              There is no immunization history on file for this patient.    Family History     Problem Relation (Age of Onset)    Arthritis Mother, Maternal Grandmother    Diabetes Maternal Grandmother, Maternal Grandfather    Kidney disease Mother        Social History     Social History    Marital status:      Spouse name: N/A    Number of children: N/A    Years of education: N/A     Social History Main Topics    Smoking status: Current Every Day Smoker     Packs/day: 0.50     Years: 26.00     Types: Cigarettes    Smokeless tobacco: Never Used    Alcohol use 1.8 oz/week     3 Shots of liquor per week      Comment: states quit 1 week ago    Drug use: Yes     Types: Marijuana    Sexual activity: Yes     Partners: Female     Other Topics Concern    None     Social History Narrative    None     Review of Systems   Constitutional: Negative for chills, diaphoresis, fatigue and fever.   Respiratory: Positive for shortness of breath.    Cardiovascular: Positive for leg swelling.   Gastrointestinal: Negative for abdominal pain, constipation, diarrhea, nausea and vomiting.   Musculoskeletal: Negative for arthralgias and back pain.   Skin: Negative for wound.   Neurological: Negative for dizziness and headaches.     Objective:     Vital Signs (Most Recent):  Temp: 97.7 °F (36.5 °C) (12/18/17 0700)  Pulse: 90 (12/18/17 1541)  Resp: (!) 21 (12/18/17 1541)  BP: 111/67 (12/16/17 0315)  SpO2: 95 % (12/18/17 1541) Vital Signs (24h Range):  Temp:  [97.7 °F (36.5 °C)-98.6 °F (37 °C)] 97.7 °F (36.5 °C)  Pulse:  [] 90  Resp:  [20-31] 21  SpO2:  [92 %-97 %] 95 %  Arterial Line BP: ()/(49-67) 105/51     Weight: 126 kg (277 lb 12.5 oz)  Body mass index is 35.66 kg/m².    Estimated Creatinine Clearance: 54.2 mL/min (based on SCr of 2.3 mg/dL (H)).    Physical Exam   Constitutional: He appears well-developed and well-nourished. No distress.   Cardiovascular: Normal rate, regular rhythm and normal heart sounds.    No  murmur heard.  Pulmonary/Chest: Effort normal. No respiratory distress. He has wheezes. He has rales.   Abdominal: Soft.   Balloon pump   Neurological: He is alert.   Skin: Skin is warm and dry. No rash noted. He is not diaphoretic. No erythema.   RIJ trialysis cath   Nursing note and vitals reviewed.      Significant Labs:   Blood Culture:   Recent Labs  Lab 12/17/17  1350 12/17/17  1531   LABBLOO Gram stain aer bottle: Gram positive rods   Gram stain pilo bottle: Gram positive rods   Results called to and read back by:Jama Palomino RN 12/18/2017  04:53 No Growth to date     CBC:   Recent Labs  Lab 12/16/17  2257 12/17/17  0527  12/17/17  2207 12/18/17  0301 12/18/17  0826   WBC 13.66* 11.17  --   --  7.40  --    HGB 15.1 14.1  --   --  12.4*  --    HCT 45.5 42.5  < > 39 36.1* 33*    136*  --   --  93*  --    < > = values in this interval not displayed.  CMP:   Recent Labs  Lab 12/16/17  2257 12/17/17  0527  12/17/17  2248 12/18/17  0301 12/18/17  1405   * 124*  < > 126* 127*  127* 129*   K 4.8 4.6  < > 4.4 4.1  4.1 4.2   CL 89* 88*  < > 93* 95  95 97   CO2 18* 19*  < > 23 22*  22* 25   * 120*  < > 108 76  76 74   BUN 46* 47*  < > 31* 25*  25* 13   CREATININE 5.7* 5.7*  < > 4.3* 3.6*  3.6* 2.3*   CALCIUM 5.7* 5.7*  < > 6.6* 6.8*  6.8* 6.7*   PROT 5.8* 5.7*  --   --  4.6*  --    ALBUMIN 2.5* 2.5*  < > 2.1* 2.0*  2.0* 2.1*   BILITOT 4.1* 4.5*  --   --  5.7*  --    ALKPHOS 216* 208*  --   --  192*  --    AST 11,084* 7,771*  --   --  2,945*  --    ALT 4,242* 3,493*  --   --  2,188*  --    ANIONGAP 16 17*  < > 10 10  10 7*   EGFRNONAA 10.7* 10.7*  < > 15.0* 18.6*  18.6* 31.9*   < > = values in this interval not displayed.  Respiratory Culture:   Recent Labs  Lab 12/17/17  1700   GSRESP <10 epithelial cells per low power field.  Moderate WBC's  Moderate Gram positive cocci  Many Gram negative diplococci  Moderate Gram negative rods   RESPIRATORYC Normal respiratory dwaine     Urine  Culture: No results for input(s): LABURIN in the last 4320 hours.  Urine Studies:   Recent Labs  Lab 12/17/17  1319   COLORU Katherine   APPEARANCEUA Cloudy*   PHUR 5.0   SPECGRAV 1.020   PROTEINUA 2+*   GLUCUA 1+*   KETONESU Negative   BILIRUBINUA Negative   OCCULTUA 3+*   NITRITE Negative   UROBILINOGEN Negative   LEUKOCYTESUR 2+*   RBCUA >100*   WBCUA 20*   BACTERIA Many*   HYALINECASTS 7*     Wound Culture: No results for input(s): LABAERO in the last 4320 hours.  All pertinent labs within the past 24 hours have been reviewed.    Significant Imaging: I have reviewed all pertinent imaging results/findings within the past 24 hours.

## 2017-12-18 NOTE — CONSULTS
[facility]  Infectious Disease  Consult Note    Patient Name: Altaf Guo  MRN: [id number]  Admission Date: 12/14/2017  Hospital Length of Stay: 4 days  Attending Physician: No att. providers found  Primary Care Provider: [facility]     Isolation Status: No active isolations      Inpatient consult to Infectious Diseases  Consult performed by: RAJAN SANCHEZ  Consult ordered by: CAMRON CROCKETT  Reason for consult: GPR bacteremia          ID consult received. Chart being reviewed. Full note with recommendations to follow.    Thank you,  Rajan Sanchez PA-C  Pgr [phone]

## 2017-12-18 NOTE — SUBJECTIVE & OBJECTIVE
Past Medical History:   Diagnosis Date    Alcoholic hepatitis 3/27/2013    Anticoagulant long-term use     Atrial fibrillation 6/7/2016    CHF (congestive heart failure)     DVT (deep venous thrombosis)     Embolic stroke involving right middle cerebral artery 6/6/2016    Embolic stroke involving right middle cerebral artery 6/6/2016    Enlarged LA (left atrium) 6/7/2016    Hypertension     Persistent atrial fibrillation 8/9/2014    Pulmonary embolism        History reviewed. No pertinent surgical history.    Review of patient's allergies indicates:  No Known Allergies    Medications:  Prescriptions Prior to Admission   Medication Sig    acetaminophen (TYLENOL) 325 MG tablet Take 2 tablets (650 mg total) by mouth every 6 (six) hours as needed for Temperature greater than (100.4).    amlodipine (NORVASC) 10 MG tablet Take 10 mg by mouth once daily.    clopidogrel (PLAVIX) 75 mg tablet Take 75 mg by mouth once daily.    folic acid (FOLVITE) 1 MG tablet Take 1 tablet (1 mg total) by mouth once daily.    FUROSEMIDE (LASIX ORAL) Take 40 mg by mouth once daily.     lisinopril (PRINIVIL,ZESTRIL) 20 MG tablet Take 20 mg by mouth once daily.    metFORMIN (GLUCOPHAGE) 500 MG tablet Take 500 mg by mouth 2 (two) times daily with meals.    metoprolol tartrate (LOPRESSOR) 100 MG tablet Take 1 tablet (100 mg total) by mouth 2 (two) times daily.    potassium chloride SA (K-DUR,KLOR-CON) 10 MEQ tablet Take 10 mEq by mouth once daily.    atorvastatin (LIPITOR) 40 MG tablet Take 1 tablet (40 mg total) by mouth once daily.    nicotine (NICODERM CQ) 21 mg/24 hr Place 1 patch onto the skin once daily.    [DISCONTINUED] rivaroxaban (XARELTO) 20 mg Tab Take 1 tablet (20 mg total) by mouth daily with dinner or evening meal.    [DISCONTINUED] thiamine 100 MG tablet Take 1 tablet (100 mg total) by mouth once daily.     Antibiotics     None        Antifungals     None        Antivirals     None             There is no  immunization history on file for this patient.    Family History     Problem Relation (Age of Onset)    Arthritis Mother, Maternal Grandmother    Diabetes Maternal Grandmother, Maternal Grandfather    Kidney disease Mother        Social History     Social History    Marital status:      Spouse name: N/A    Number of children: N/A    Years of education: N/A     Social History Main Topics    Smoking status: Current Every Day Smoker     Packs/day: 0.50     Years: 26.00     Types: Cigarettes    Smokeless tobacco: Never Used    Alcohol use 1.8 oz/week     3 Shots of liquor per week      Comment: states quit 1 week ago    Drug use: Yes     Types: Marijuana    Sexual activity: Yes     Partners: Female     Other Topics Concern    None     Social History Narrative    None     Review of Systems   Constitutional: Negative for chills, diaphoresis, fatigue and fever.   Respiratory: Positive for shortness of breath.    Cardiovascular: Positive for leg swelling.   Gastrointestinal: Negative for abdominal pain, constipation, diarrhea, nausea and vomiting.   Musculoskeletal: Negative for arthralgias and back pain.   Skin: Negative for wound.   Neurological: Negative for dizziness and headaches.     Objective:     Vital Signs (Most Recent):  Temp: 97.7 °F (36.5 °C) (12/18/17 0700)  Pulse: 90 (12/18/17 1541)  Resp: (!) 21 (12/18/17 1541)  BP: 111/67 (12/16/17 0315)  SpO2: 95 % (12/18/17 1541) Vital Signs (24h Range):  Temp:  [97.7 °F (36.5 °C)-98.6 °F (37 °C)] 97.7 °F (36.5 °C)  Pulse:  [] 90  Resp:  [20-31] 21  SpO2:  [92 %-97 %] 95 %  Arterial Line BP: ()/(49-67) 105/51     Weight: 126 kg (277 lb 12.5 oz)  Body mass index is 35.66 kg/m².    Estimated Creatinine Clearance: 54.2 mL/min (based on SCr of 2.3 mg/dL (H)).    Physical Exam   Constitutional: He appears well-developed and well-nourished. No distress.   Cardiovascular: Normal rate, regular rhythm and normal heart sounds.    No murmur  heard.  Pulmonary/Chest: Effort normal. No respiratory distress. He has wheezes. He has rales.   Abdominal: Soft.   Balloon pump   Neurological: He is alert.   Skin: Skin is warm and dry. No rash noted. He is not diaphoretic. No erythema.   [facility] trialysis cath   Nursing note and vitals reviewed.      Significant Labs:   Blood Culture:   Recent Labs  Lab 12/17/17  1350 12/17/17  1531   LABBLOO Gram stain aer bottle: Gram positive rods   Gram stain pilo bottle: Gram positive rods   Results called to and read back by:Jama Palomino RN 12/18/2017  04:53 No Growth to date     CBC:   Recent Labs  Lab 12/16/17  2257 12/17/17  0527  12/17/17  2207 12/18/17  0301 12/18/17  0826   WBC 13.66* 11.17  --   --  7.40  --    HGB 15.1 14.1  --   --  12.4*  --    HCT 45.5 42.5  < > 39 36.1* 33*    136*  --   --  93*  --    < > = values in this interval not displayed.  CMP:   Recent Labs  Lab 12/16/17  2257 12/17/17  0527  12/17/17  2248 12/18/17  0301 12/18/17  1405   * 124*  < > 126* 127*  127* 129*   K 4.8 4.6  < > 4.4 4.1  4.1 4.2   CL 89* 88*  < > 93* 95  95 97   CO2 18* 19*  < > 23 22*  22* 25   * 120*  < > 108 76  76 74   BUN 46* 47*  < > 31* 25*  25* 13   CREATININE 5.7* 5.7*  < > 4.3* 3.6*  3.6* 2.3*   CALCIUM 5.7* 5.7*  < > 6.6* 6.8*  6.8* 6.7*   PROT 5.8* 5.7*  --   --  4.6*  --    ALBUMIN 2.5* 2.5*  < > 2.1* 2.0*  2.0* 2.1*   BILITOT 4.1* 4.5*  --   --  5.7*  --    ALKPHOS 216* 208*  --   --  192*  --    AST 11,084* 7,771*  --   --  2,945*  --    ALT 4,242* 3,493*  --   --  2,188*  --    ANIONGAP 16 17*  < > 10 10  10 7*   EGFRNONAA 10.7* 10.7*  < > 15.0* 18.6*  18.6* 31.9*   < > = values in this interval not displayed.  Respiratory Culture:   Recent Labs  Lab 12/17/17  1700   GSRESP <10 epithelial cells per low power field.  Moderate WBC's  Moderate Gram positive cocci  Many Gram negative diplococci  Moderate Gram negative rods   RESPIRATORYC Normal respiratory dwaine     Urine Culture: No  results for input(s): LABURIN in the last 4320 hours.  Urine Studies:   Recent Labs  Lab 12/17/17  1319   COLORU Katherine   APPEARANCEUA Cloudy*   PHUR 5.0   SPECGRAV 1.020   PROTEINUA 2+*   GLUCUA 1+*   KETONESU Negative   BILIRUBINUA Negative   OCCULTUA 3+*   NITRITE Negative   UROBILINOGEN Negative   LEUKOCYTESUR 2+*   RBCUA >100*   WBCUA 20*   BACTERIA Many*   HYALINECASTS 7*     Wound Culture: No results for input(s): LABAERO in the last 4320 hours.  All pertinent labs within the past 24 hours have been reviewed.    Significant Imaging: I have reviewed all pertinent imaging results/findings within the past 24 hours.

## 2017-12-18 NOTE — ASSESSMENT & PLAN NOTE
- Platelet count has dropped to 93K on fixed dose Heparin (down by more than half). Heparin gtt D/C'd, HIT/GISELE obtained

## 2017-12-18 NOTE — PROGRESS NOTES
Received call from primary nurse that patient remains hypocalcemic.  Corrected calcium ~ 8.2.  Will obtain iCA.  Replace with 2 gm of calcium gluconate now.  Notified dialysis nurse to increase calcium bath as ordered.    BRENT Chacko, [facility]  Nephrology  Pager:  [phone]

## 2017-12-18 NOTE — ASSESSMENT & PLAN NOTE
52 year old male with a history of HTN, CHF,  DVT/PE, CVA and alcohol abuse currently transferred here with IABP placed 12/15, and in cardiogenic shock. We are consulted for GPR in blood 12/17.     Plan:  1. Will not start antibiotics at this time, suspect blood cultures 12/17 GPR is a likely a contaminant. UA with 20 WBCs, not complaining of any urinary symptoms. Respiratory culture with normal dwaine. Pt remain stable, afebrile here, leukocytosis resolved.     Seen and discussed with ID staff. Will sign off.

## 2017-12-18 NOTE — SUBJECTIVE & OBJECTIVE
Interval History:   Patient is now in NSR in  80's. CVP is 8 this morning. Started on CRRT. Still has evidence of shock liver. On iABP as well. Off of pressors currently. INR is 2.6 this morning.    Review of Systems   Constitution: Negative for chills and fever.   HENT: Negative for congestion and nosebleeds.    Eyes: Negative for pain.   Cardiovascular: Negative for chest pain, dyspnea on exertion, leg swelling and palpitations.   Respiratory: Negative for shortness of breath.    Musculoskeletal: Negative for arthritis and back pain.   Gastrointestinal: Negative for abdominal pain, nausea and vomiting.   Genitourinary: Negative for dysuria and hematuria.     Objective:     Vital Signs (Most Recent):  Temp: 97.7 °F (36.5 °C) (12/18/17 0700)  Pulse: 84 (12/18/17 0900)  Resp: (!) 29 (12/18/17 0900)  BP: 111/67 (12/16/17 0315)  SpO2: 96 % (12/18/17 0900) Vital Signs (24h Range):  Temp:  [97.5 °F (36.4 °C)-98.6 °F (37 °C)] 97.7 °F (36.5 °C)  Pulse:  [] 84  Resp:  [22-33] 29  SpO2:  [92 %-97 %] 96 %  Arterial Line BP: ()/(47-67) 114/50     Weight: 126 kg (277 lb 12.5 oz)  Body mass index is 35.66 kg/m².     SpO2: 96 %  O2 Device (Oxygen Therapy): nasal cannula w/ humidification    Physical Exam   Constitutional: He is oriented to person, place, and time. He appears well-developed and well-nourished.   HENT:   Head: Normocephalic and atraumatic.   Eyes: EOM are normal. Pupils are equal, round, and reactive to light.   Neck: Normal range of motion. Neck supple. JVD (CVP was 23) present.   Cardiovascular:   Tachycardic and irregular rhythm.    Pulmonary/Chest: Effort normal and breath sounds normal.   Abdominal: Soft. Bowel sounds are normal. He exhibits no distension.   Musculoskeletal: He exhibits no edema.   Neurological: He is alert and oriented to person, place, and time. He has normal reflexes.       Significant Labs: EP:   Recent Labs  Lab 12/16/17  2257 12/17/17  0527  12/17/17  1702  12/17/17  2242  12/18/17  0301 12/18/17  0826   * 124*  < > 125*  --  126* 127*  127*  --    K 4.8 4.6  < > 4.5  --  4.4 4.1  4.1  --    CL 89* 88*  < > 91*  --  93* 95  95  --    CO2 18* 19*  < > 19*  --  23 22*  22*  --    * 120*  < > 122*  --  108 76  76  --    BUN 46* 47*  < > 47*  --  31* 25*  25*  --    CREATININE 5.7* 5.7*  < > 5.9*  --  4.3* 3.6*  3.6*  --    CALCIUM 5.7* 5.7*  < > 6.3*  --  6.6* 6.8*  6.8*  --    PROT 5.8* 5.7*  --   --   --   --  4.6*  --    ALBUMIN 2.5* 2.5*  < > 2.2*  --  2.1* 2.0*  2.0*  --    BILITOT 4.1* 4.5*  --   --   --   --  5.7*  --    ALKPHOS 216* 208*  --   --   --   --  192*  --    AST 11,084* 7,771*  --   --   --   --  2,945*  --    ALT 4,242* 3,493*  --   --   --   --  2,188*  --    ANIONGAP 16 17*  < > 15  --  10 10  10  --    ESTGFRAFRICA 12.3* 12.3*  < > 11.8*  --  17.3* 21.5*  21.5*  --    EGFRNONAA 10.7* 10.7*  < > 10.2*  --  15.0* 18.6*  18.6*  --    WBC 13.66* 11.17  --   --   --   --  7.40  --    HGB 15.1 14.1  --   --   --   --  12.4*  --    HCT 45.5 42.5  < >  --   < >  --  36.1* 33*    136*  --   --   --   --  93*  --    INR 5.6* 3.6*  --   --   --   --  2.6*  --    < > = values in this interval not displayed.    Significant Imaging: Echocardiogram:   2D echo with color flow doppler:   Results for orders placed or performed during the hospital encounter of 12/14/17   2D echo with color flow doppler   Result Value Ref Range    EF 10 (A) 55 - 65    Mitral Valve Regurgitation MILD     Diastolic Dysfunction Yes (A)     Est. PA Systolic Pressure 37.09     Pericardial Effusion SMALL (A)     Tricuspid Valve Regurgitation MODERATE (A)

## 2017-12-18 NOTE — HPI
53 yo AAM with cardiogenic shock transferred for further care- has longstanding chronic combined HF, NICM,tobacco and etOH use, prior CTED (DVT/PE), stroke- transferred with IABP, DA and NE- shock associated with acute liver and renal failure, AF with RVR

## 2017-12-18 NOTE — ASSESSMENT & PLAN NOTE
- sVO2 80% (concern for sepsis - see below)  - Has been weaned off Epi and Vaso  - Will wean off Ashleigh  - Currently too sick for advanced options

## 2017-12-18 NOTE — SUBJECTIVE & OBJECTIVE
Interval History: Feels OK except for bilateral foot pain (has doppler pulses)    Continuous Infusions:   epinephrine infusion 0.02 mcg/kg/min (12/18/17 0800)    nitric oxide gas      vasopressin (PITRESSIN) infusion Stopped (12/18/17 0000)     Scheduled Meds:   albuterol-ipratropium 2.5mg-0.5mg/3mL  3 mL Nebulization Q4H    clopidogrel  75 mg Oral Daily    diazePAM  5 mg Oral Q8H    [START ON 12/20/2017] digoxin  0.125 mg Oral Q72H    folic acid  1 mg Oral Daily    multivitamin  1 tablet Oral Daily    senna-docusate 8.6-50 mg  1 tablet Oral BID    sodium chloride 0.9%  3 mL Intravenous Q8H    thiamine  100 mg Oral Daily     PRN Meds:sodium chloride 0.9%, sodium chloride, acetaminophen, atropine, dextrose 50%, dextrose 50%, glucagon (human recombinant), glucose, glucose, hydrocodone-acetaminophen 5-325mg, insulin aspart, lorazepam, magnesium sulfate IVPB, morphine, ondansetron, ondansetron, oxyCODONE, sodium chloride 0.9%    Review of patient's allergies indicates:  No Known Allergies  Objective:     Vital Signs (Most Recent):  Temp: 97.7 °F (36.5 °C) (12/18/17 0700)  Pulse: 83 (12/18/17 1000)  Resp: (!) 27 (12/18/17 1000)  BP: 111/67 (12/16/17 0315)  SpO2: 96 % (12/18/17 1036) Vital Signs (24h Range):  Temp:  [97.5 °F (36.4 °C)-98.6 °F (37 °C)] 97.7 °F (36.5 °C)  Pulse:  [] 83  Resp:  [22-30] 27  SpO2:  [92 %-97 %] 96 %  Arterial Line BP: ()/(47-67) 116/51     Patient Vitals for the past 72 hrs (Last 3 readings):   Weight   12/16/17 0315 126 kg (277 lb 12.5 oz)     Body mass index is 35.66 kg/m².      Intake/Output Summary (Last 24 hours) at 12/18/17 1055  Last data filed at 12/18/17 1000   Gross per 24 hour   Intake           6597.4 ml   Output             9008 ml   Net          -2410.6 ml       Hemodynamic Parameters:       Telemetry: Currently in SR    Physical Exam   Constitutional: He is oriented to person, place, and time. He appears well-developed and well-nourished.   HENT:   Head:  Normocephalic and atraumatic.   Eyes: Conjunctivae and EOM are normal. Pupils are equal, round, and reactive to light.   Neck: Normal range of motion. Neck supple.   RIJ trialysis cath   Cardiovascular:   RRR except ectopi; + S3. Pedal pulses audible with doppler   Pulmonary/Chest: Effort normal.   Scattered coarse rhonchi   Abdominal: Soft. Bowel sounds are normal.   Musculoskeletal:   4+ edema mid back down; no cyanosis   Neurological: He is alert and oriented to person, place, and time.   Skin: Skin is warm and dry. Capillary refill takes 2 to 3 seconds.   Psychiatric: He has a normal mood and affect. His behavior is normal. Judgment and thought content normal.       Significant Labs:  CBC:    Recent Labs  Lab 12/16/17 2257 12/17/17 0527  12/17/17  2207 12/18/17  0301 12/18/17  0826   WBC 13.66* 11.17  --   --  7.40  --    RBC 5.08 4.80  --   --  4.13*  --    HGB 15.1 14.1  --   --  12.4*  --    HCT 45.5 42.5  < > 39 36.1* 33*    136*  --   --  93*  --    MCV 90 89  --   --  87  --    MCH 29.7 29.4  --   --  30.0  --    MCHC 33.2 33.2  --   --  34.3  --    < > = values in this interval not displayed.  BNP:    Recent Labs  Lab 12/14/17  0740 12/16/17 2257   * 671*     CMP:    Recent Labs  Lab 12/16/17 2257 12/17/17  0527  12/17/17  1702 12/17/17  2248 12/18/17  0301   * 120*  < > 122* 108 76  76   CALCIUM 5.7* 5.7*  < > 6.3* 6.6* 6.8*  6.8*   ALBUMIN 2.5* 2.5*  < > 2.2* 2.1* 2.0*  2.0*   PROT 5.8* 5.7*  --   --   --  4.6*   * 124*  < > 125* 126* 127*  127*   K 4.8 4.6  < > 4.5 4.4 4.1  4.1   CO2 18* 19*  < > 19* 23 22*  22*   CL 89* 88*  < > 91* 93* 95  95   BUN 46* 47*  < > 47* 31* 25*  25*   CREATININE 5.7* 5.7*  < > 5.9* 4.3* 3.6*  3.6*   ALKPHOS 216* 208*  --   --   --  192*   ALT 4,242* 3,493*  --   --   --  2,188*   AST 11,084* 7,771*  --   --   --  2,945*   BILITOT 4.1* 4.5*  --   --   --  5.7*   < > = values in this interval not displayed.   Coagulation:     Recent  Labs  Lab 12/16/17  2257 12/17/17  0527 12/18/17  0301   INR 5.6* 3.6* 2.6*   APTT 36.1*  --  35.5*     LDH:  No results for input(s): LDH in the last 72 hours.  Microbiology:  Microbiology Results (last 7 days)     Procedure Component Value Units Date/Time    Culture, Respiratory with Gram Stain [[id number]] Collected:  12/17/17 1700    Order Status:  Completed Specimen:  Respiratory from Sputum Updated:  12/18/17 0729     Respiratory Culture Normal respiratory dwaine     Gram Stain (Respiratory) <10 epithelial cells per low power field.     Gram Stain (Respiratory) Moderate WBC's     Gram Stain (Respiratory) Moderate Gram positive cocci     Gram Stain (Respiratory) Many Gram negative diplococci     Gram Stain (Respiratory) Moderate Gram negative rods    Blood culture [[id number]] Collected:  12/17/17 1350    Order Status:  Completed Specimen:  Blood from Peripheral, Antecubital, Left Updated:  12/18/17 0453     Blood Culture, Routine Gram stain aer bottle: Gram positive rods      Blood Culture, Routine Gram stain pilo bottle: Gram positive rods      Blood Culture, Routine Results called to and read back by:Jama Palomino RN 12/18/2017  04:53    Blood culture [[id number]] Collected:  12/17/17 1531    Order Status:  Completed Specimen:  Blood from Peripheral, Wrist, Right Updated:  12/18/17 0115     Blood Culture, Routine No Growth to date    Urine culture [[id number]] Collected:  12/17/17 1318    Order Status:  Sent Specimen:  Urine from Urine, Catheterized Updated:  12/17/17 1348          I have reviewed all pertinent labs within the past 24 hours.    Estimated Creatinine Clearance: 34.6 mL/min (based on SCr of 3.6 mg/dL (H)).

## 2017-12-19 PROBLEM — K31.0: Status: ACTIVE | Noted: 2017-01-01

## 2017-12-19 NOTE — ASSESSMENT & PLAN NOTE
- IABP in place augmenting well @1:1. Decreased to 1:2 with no change in svo2 or vitals. BP's in 140's systolic today. Was planning to remove IABP but due to patients coughing and blood tinged sputum will hold off for now. Will plan to decrease support back to 1:2 3AM tonight to prepare for removal tomorrow pending lab stability  - was planning on starting afterload reduction today but will hold off in light of patient vomiting. Will start post IABP removal if patient tolerates. IABP placed at OSH so must be pulled by our staff or advanced fellow  - sVO2 75% (concern for sepsis yesterday but per ID bacillus in Bcx most likely contaminant)  - Has been weaned off Epi, vaso, and Ashleigh  - Currently too sick for advanced options

## 2017-12-19 NOTE — ASSESSMENT & PLAN NOTE
- Afebrile and no leukocytosis, but cultures obtained in the setting of high sVO2. Blood cultures with GPR - ID consulted. Per ID believe this is a contaminant and do not recommend abx.  - Patient remains afebrile with no leuks today. Cough with much productive sputum with some blood this afternoon. Continue guaifenesin.

## 2017-12-19 NOTE — ASSESSMENT & PLAN NOTE
- Nephrology following, appreciate assistance   - CVP 10 this AM  - Tolerating CRRT with , -3L overnight, some UOP overnight but none since 12 noon today

## 2017-12-19 NOTE — ASSESSMENT & PLAN NOTE
- baseline sCr 0.9-1.1  - likely ischemic ATN from cardiogenic shock. Also received IV contrast on 12/14 which will likely delay renal recovery.     -continues on SLED, net negative 3.5L/24h.  CVP this morning ranging between 8-10.  Denies SOB.  -plans to remove IABP today.  -will continue SLED for metabolic clearance/volume management.  Minimal hourly intake, so will decrease UF goal to 300-350 cc/hr as tolerated.

## 2017-12-19 NOTE — PROGRESS NOTES
Notified GARRICK Goodwin pt hemodynamics unchanged but patient states he is feeling nauseated. Pt also diaphoretic. . Vital signs per flowsheet. Team will be to bedside to assess patient and update family on plan of care. Will continue to monitor.

## 2017-12-19 NOTE — PROGRESS NOTES
[facility]  Nephrology  Progress Note    Patient Name: Altaf Guo  MRN: [id number]  Admission Date: 12/14/2017  Hospital Length of Stay: 5 days  Attending Provider: Tu Tong MD   Primary Care Physician: [facility]  Principal Problem:Cardiogenic shock    Subjective:     HPI: Mr. Guo is a 51 yo AAM with HFrEF (previously 15%), HTN, T2DM, afib, alcoholic hepatitis, DVT/PE no longer on Xarelto (couldn't afford), and embolic stroke who presented to [facility] on 12/14 with BLE edema, TURNER, and cough. Symptoms started 2 weeks prior to presentation and have worsened over time. He had stopped taking his beta-blocker around the time of symptom onset. CTA was negative for PE. Workup was consistent with cardiogenic shock; IABP was placed and patient was transferred to [facility] main campus. Patient was reportedly anuric at OSH and was evaluated by nephrology. Renal US was unremarkable. IVF were recommended (he received 1.5L NS in addition to continuous IVF) and dialysis was not offered due to poor candidacy. Upon transfer patient received lasix 80mg IV x1 without response. We also tried lasix 160mg IV; also without response. UOP only 125cc since arrival. He is net +2.7L/24h. He is currently receiving dopamine, epi, norepi, heparin, and nitric oxide infusions. He is on 4L NC. He has no prior h/o kidney disease. No h/o kidney stones or UTIs. Does report BC powder use about 2 weeks ago. He currently denies any SOB or chest pain. He does admit to feeling overwhelmed by everything that is going on. He is agreeable to proceeding with RRT to help with his cardiac status. Consent signed by his wife and placed in chart.     Interval History:   Continues on SLED this morning, tolerating well, net negative 3.5L/24h.  Electrolytes stable.  Able to be weaned from epi discontinued this morning.  Plans to remove IABP today.    Review of patient's allergies indicates:  No Known Allergies  Current  Facility-Administered Medications   Medication Frequency    0.9%  NaCl infusion (CRRT USE ONLY) PRN    0.9%  NaCl infusion (for blood administration) Q24H PRN    acetaminophen tablet 650 mg Q4H PRN    albuterol-ipratropium 2.5mg-0.5mg/3mL nebulizer solution 3 mL Q6H    atropine injection 0.5 mg PRN    clopidogrel tablet 75 mg Daily    dextrose 50% injection 12.5 g PRN    dextrose 50% injection 25 g PRN    diazePAM tablet 5 mg Q8H    [START ON 12/20/2017] digoxin tablet 0.125 mg Q72H    EPINEPHrine (ADRENALIN) 4 mg in sodium chloride 0.9% 250 mL infusion Continuous    folic acid tablet 1 mg Daily    glucagon (human recombinant) injection 1 mg PRN    glucose chewable tablet 16 g PRN    glucose chewable tablet 24 g PRN    guaiFENesin 12 hr tablet 600 mg BID    heparin 25,000 units in dextrose 5% 250 mL (100 units/mL) infusion (heparin infusion) Continuous    hydrocodone-acetaminophen 5-325mg per tablet 1 tablet Q4H PRN    insulin aspart pen 1-10 Units QID (AC + HS) PRN    lorazepam injection 1 mg Q4H PRN    magnesium sulfate 2g in water 50mL IVPB (premix) PRN    morphine injection 2 mg Q10 Min PRN    multivitamin tablet 1 tablet Daily    ondansetron injection 4 mg Q8H PRN    ondansetron injection 4 mg Q12H PRN    oxyCODONE immediate release tablet 5 mg Q4H PRN    potassium, sodium phosphates 280-160-250 mg packet 2 packet PRN    potassium, sodium phosphates 280-160-250 mg packet 2 packet PRN    potassium, sodium phosphates 280-160-250 mg packet 2 packet PRN    senna-docusate 8.6-50 mg per tablet 1 tablet BID    sodium chloride 0.9% bolus 250 mL PRN    sodium chloride 0.9% flush 3 mL Q8H    thiamine tablet 100 mg Daily    vasopressin (PITRESSIN) 0.2 Units/mL in dextrose 5 % 100 mL infusion Continuous       Objective:     Vital Signs (Most Recent):  Temp: 97.6 °F (36.4 °C) (12/19/17 0700)  Pulse: (!) 124 (12/19/17 0757)  Resp: (!) 28 (12/19/17 0757)  BP: 111/67 (12/16/17 0315)  SpO2: 97  % (12/19/17 0932)  O2 Device (Oxygen Therapy): room air (12/19/17 3581) Vital Signs (24h Range):  Temp:  [97.6 °F (36.4 °C)-98.9 °F (37.2 °C)] 97.6 °F (36.4 °C)  Pulse:  [] 124  Resp:  [] 28  SpO2:  [91 %-98 %] 97 %  Arterial Line BP: ()/(49-69) 129/63     Weight: 126 kg (277 lb 12.5 oz) (12/16/17 0315)  Body mass index is 35.66 kg/m².  Body surface area is 2.56 meters squared.    I/O last 3 completed shifts:  In: 9970.2 [P.O.:1190; I.V.:8580.2; IV Piggyback:200]  Out: 46445 [Urine:205; Other:56767]    Physical Exam   Constitutional: He is oriented to person, place, and time. He appears well-developed and well-nourished.   HENT:   Head: Normocephalic and atraumatic.   Eyes: Conjunctivae and EOM are normal.   Neck: Neck supple. No tracheal deviation present. No thyromegaly present.   Cardiovascular: Regular rhythm.  Tachycardia present.    Pulmonary/Chest: Effort normal. He has no wheezes. He has rhonchi.   Abdominal: Soft. There is no tenderness. There is no guarding.   Musculoskeletal: He exhibits edema (2+). He exhibits no deformity.   Lymphadenopathy:     He has no cervical adenopathy.   Neurological: He is alert and oriented to person, place, and time.   Skin: Skin is warm and dry. He is not diaphoretic.   Psychiatric: His behavior is normal. Judgment normal.       Significant Labs:  CBC:   Recent Labs  Lab 12/19/17 0414   WBC 8.64   RBC 4.40*   HGB 13.0*   HCT 37.5*   PLT 91*   MCV 85   MCH 29.5   MCHC 34.7     CMP:   Recent Labs  Lab 12/19/17 0414   GLU 74  74   CALCIUM 8.6*  8.6*   ALBUMIN 2.0*  2.0*   PROT 4.7*   *  134*   K 4.2  4.2   CO2 26  26     101   BUN 7  7   CREATININE 1.5*  1.5*   ALKPHOS 233*   ALT 1,544*   AST 1,458*   BILITOT 8.3*          Assessment/Plan:     ARF (acute renal failure)    - baseline sCr 0.9-1.1  - likely ischemic ATN from cardiogenic shock. Also received IV contrast on 12/14 which will likely delay renal recovery.     -continues on SLED,  net negative 3.5L/24h.  CVP this morning ranging between 8-10.  Denies SOB.  -plans to remove IABP today.  -will continue SLED for metabolic clearance/volume management.  Minimal hourly intake, so will decrease UF goal to 300-350 cc/hr as tolerated.            Yung Dwyer NP  Nephrology  [facility]  Pager:  [phone]

## 2017-12-19 NOTE — ASSESSMENT & PLAN NOTE
- H/O left PE 2014 - was on Xarelto but stopped taking. CTA chest 12/14/17 at [facility] showed possible small emboli within the basal segmental branches of the RLL (limited by motion artifact). No large central PE  - INR 2.0. Fixed dose Heparin D/C'd 2/2 drop in platelet count yesterday, restarted now that IABP is at 1:2 and HIT was -oksana

## 2017-12-19 NOTE — PROGRESS NOTES
Admit Note     Met with spouse to assess patients needs due to pt being critically ill. Patient is a 52 y.o.  male, admitted for heart failure.     Patient admitted from [facility] on 12/14/2017 .  At this time, patient presents as awake but quiet/sleepy, not talking at all.  At this time, patients caregiver presents as alert and oriented x 4, pleasant and communicative.      Household/Family Systems (as reported by patients caregiver)     Patient resides with patient's spouse, at:     [facility] [facility] [id number].    Pt cell: [phone]  Yulissa, pt wife, C: [phone]  Slick pt dtr, C: [phone]      Support system includes wife, mother, 2 biological children, 2 stepchildren and extended family.  Patient does not have dependents that are need of being cared for.     Patients primary caregiver is fritz Duenas wife, phone number listed above.  Confirmed patients contact information is [phone] (home);   Telephone Information:   Mobile [phone]       During admission, patient's caregiver plans to stay at home.  Confirmed patient and patients caregivers do not have access to reliable transportation. Pt wife does not drive, she relied on her  for transportation, she stated their children are assisting at this time.    Cognitive Status/Learning     Patients caregiver reports patients reading ability as 12th grade and states patient does not have difficulty with N/A.  Patients caregiver reports patient learns best by hands on learning.   Needed: No.   Highest education level: High School (9-12) or GED    Vocation/Disability (as reported by patients caregiver)    Working for Income: yes  If yes, working activity level: Working Part Time Due to Patient Choice  Patient was working part time as a  prior to this admission.    Adherence     Patients caregiver reports patient has a medium level of adherence to patients health care regimen.   Adherence counseling and education provided.  Patient's caregiver verbalizes understanding.    Substance Use    Patients caregiver reports patients substance usage as the following:    Tobacco: Pt smokes 1/2 PPD.  Alcohol: Pt wife states that per her knowledge pt stopped drinking 1.5yrs ago, prior to this pt did drink hard liquor daily, she is unsure of exact amount as he hid it from her.  Illicit Drugs/Non-prescribed Medications: none, patient denies any use.  Patients caregiver states clear understanding of the potential impact of substance use.  Substance abstinence/cessation counseling, education and resources provided and reviewed.     Services Utilizing/ADLS (as reported by patients caregiver)    Infusion Service: Prior to admission, patient utilizing? no  Home Health: Prior to admission, patient utilizing? no  DME: Prior to admission, no  Pulmonary/Cardiac Rehab: Prior to admission, no  Dialysis:  Prior to admission, no  Transplant Specialty Pharmacy:  Prior to admission, no.    Prior to admission, patients caregiver reports patient was independent with ADLS and was driving.  Patients caregiver reports patient is not able to care for self at this time due to compromised medical condition (as documented in medical record) and physical weakness..  Patients caregiver reports patient indicates a willingness to care for self once medically cleared to do so.    Insurance/Medications    Insured by   Payor/Plan Subscr  Sex Relation Sub. Ins. ID Effective Group Num      Primary Insurance (for UNOS reporting): None  Secondary Insurance (for UNOS reporting): None    Patients caregiver reports patient is not able to obtain and afford medications at this time and at time of discharge.    Living Will/Healthcare Power of     Patients caregiver reports patient does not have a LW and/or HCPA.   provided education regarding LW and HCPA and the completion of forms.    Coping/Mental Health (as  reported by patients caregiver)    Patient is coping adequately with the aid of  family members.  Patients caregiver is coping adequately with the aid of  family members.      Discharge Planning (as reported by patients caregiver)    At time of discharge, patient plans to return to undecided at this time under the care of family.  Unsure who will transport patient.  Per rounds today, expected discharge date has not been medically determined at this time. Patients caretaker verbalizes understanding and is involved in treatment planning and discharge process.    Additional Concerns    Patient's caretaker denies additional needs and/or concerns at this time. Patient is being followed for needs, education, resources, information, emotional support, supportive counseling, and for supportive and skilled discharge plan of care.  remains available.

## 2017-12-19 NOTE — PROGRESS NOTES
Notified HTS that patients heart rate 140-160's afib. MD coming to bedside to assess. See vital signs per flow sheet. Will continue to monitor.

## 2017-12-19 NOTE — PROGRESS NOTES
[facility]  Heart Transplant  Progress Note    Patient Name: Altaf Guo  MRN: [id number]  Admission Date: 12/14/2017  Hospital Length of Stay: 5 days  Attending Physician: Tu Tong MD  Primary Care Provider: [facility]  Principal Problem:Cardiogenic shock    Subjective:     Interval History: Feels OK other than productive cough. Denies chest pain, SOB, NVD. Family at bedside.     Continuous Infusions:   epinephrine infusion Stopped (12/18/17 1521)    heparin (porcine) in 5 % dex 600 Units/hr (12/19/17 1106)    vasopressin (PITRESSIN) infusion Stopped (12/18/17 0000)     Scheduled Meds:   albuterol-ipratropium 2.5mg-0.5mg/3mL  3 mL Nebulization Q6H    clopidogrel  75 mg Oral Daily    diazePAM  5 mg Oral Q8H    [START ON 12/20/2017] digoxin  0.125 mg Oral Q72H    folic acid  1 mg Oral Daily    guaiFENesin  600 mg Oral BID    hydrALAZINE  25 mg Oral Q8H    isosorbide dinitrate  20 mg Oral TID    multivitamin  1 tablet Oral Daily    senna-docusate 8.6-50 mg  1 tablet Oral BID    sodium chloride 0.9%  3 mL Intravenous Q8H    thiamine  100 mg Oral Daily     PRN Meds:sodium chloride 0.9%, sodium chloride, acetaminophen, atropine, dextrose 50%, dextrose 50%, glucagon (human recombinant), glucose, glucose, hydrocodone-acetaminophen 5-325mg, insulin aspart, lorazepam, magnesium sulfate IVPB, morphine, ondansetron, ondansetron, oxyCODONE, potassium, sodium phosphates, potassium, sodium phosphates, potassium, sodium phosphates, sodium chloride 0.9%    Review of patient's allergies indicates:  No Known Allergies  Objective:     Vital Signs (Most Recent):  Temp: 97.9 °F (36.6 °C) (12/19/17 1100)  Pulse: (!) 137 (12/19/17 1415)  Resp: (!) 37 (12/19/17 1415)  BP: 111/67 (12/16/17 0315)  SpO2: (!) 92 % (12/19/17 1412) Vital Signs (24h Range):  Temp:  [97.6 °F (36.4 °C)-98.9 °F (37.2 °C)] 97.9 °F (36.6 °C)  Pulse:  [] 137  Resp:  [] 37  SpO2:  [91 %-98 %] 92  %  Arterial Line BP: (105-152)/(51-79) 145/79     No data found.    Body mass index is 35.66 kg/m².      Intake/Output Summary (Last 24 hours) at 12/19/17 1453  Last data filed at 12/19/17 1100   Gross per 24 hour   Intake          4802.83 ml   Output             7872 ml   Net         -3069.17 ml     Hemodynamic Parameters:    Telemetry: Currently in Afib    Physical Exam   Constitutional: He is oriented to person, place, and time. He appears well-developed and well-nourished.   HENT:   Head: Normocephalic and atraumatic.   Eyes: Conjunctivae and EOM are normal. Pupils are equal, round, and reactive to light.   Neck: Normal range of motion. Neck supple.   RIJ trialysis cath   Cardiovascular:   Irregularly irregular except ectopi; + S3. Pedal pulses audible with doppler   Pulmonary/Chest: Effort normal.   Scattered coarse rhonchi   Abdominal: Soft. Bowel sounds are normal.   Musculoskeletal:   4+ edema mid back down; no cyanosis   Neurological: He is alert and oriented to person, place, and time.   Skin: Skin is warm and dry. Capillary refill takes 2 to 3 seconds.   Psychiatric: He has a normal mood and affect. His behavior is normal. Judgment and thought content normal.       Significant Labs:  CBC:    Recent Labs  Lab 12/18/17  0301 12/18/17  0826 12/19/17  0414 12/19/17  1351   WBC 7.40  --  8.64 10.42   RBC 4.13*  --  4.40* 4.56*   HGB 12.4*  --  13.0* 13.6*   HCT 36.1* 33* 37.5* 38.8*   PLT 93*  --  91* 78*   MCV 87  --  85 85   MCH 30.0  --  29.5 29.8   MCHC 34.3  --  34.7 35.1     BNP:    Recent Labs  Lab 12/14/17  0740 12/16/17  2257   * 671*     CMP:    Recent Labs  Lab 12/18/17  0301  12/18/17  2301 12/19/17  0414 12/19/17  1351   GLU 76  76  < > 67* 74  74 90  90   CALCIUM 6.8*  6.8*  < > 8.2* 8.6*  8.6* 8.7  8.7   ALBUMIN 2.0*  2.0*  < > 2.0* 2.0*  2.0* 2.0*  2.0*   PROT 4.6*  --   --  4.7* 4.9*   *  127*  < > 133* 134*  134* 134*  134*   K 4.1  4.1  < > 4.4 4.2  4.2 4.9  4.9    CO2 22*  22*  < > 27 26  26 24  24   CL 95  95  < > 100 101  101 103  103   BUN 25*  25*  < > 10 7  7 9  9   CREATININE 3.6*  3.6*  < > 2.0* 1.5*  1.5* 1.7*  1.7*   ALKPHOS 192*  --   --  233* 235*   ALT 2,188*  --   --  1,544* 1,380*   AST 2,945*  --   --  1,458* 1,093*   BILITOT 5.7*  --   --  8.3* 8.6*   < > = values in this interval not displayed.   Coagulation:     Recent Labs  Lab 12/16/17  2257 12/17/17  0527 12/18/17  0301 12/19/17  0414   INR 5.6* 3.6* 2.6* 2.0*   APTT 36.1*  --  35.5*  --      LDH:  No results for input(s): LDH in the last 72 hours.  Microbiology:  Microbiology Results (last 7 days)     Procedure Component Value Units Date/Time    Culture, Respiratory with Gram Stain [[id number]] Collected:  12/19/17 1352    Order Status:  Sent Specimen:  Respiratory from Sputum, Expectorated Updated:  12/19/17 1439    Blood culture [[id number]] Collected:  12/17/17 1350    Order Status:  Completed Specimen:  Blood from Peripheral, Antecubital, Left Updated:  12/19/17 0956     Blood Culture, Routine Gram stain aer bottle: Gram positive rods      Blood Culture, Routine Gram stain pilo bottle: Gram positive rods      Blood Culture, Routine Results called to and read back by:Jama Palomino RN 12/18/2017  04:53     Blood Culture, Routine --     BACILLUS SPECIES  Organism is a probable contaminant      Culture, Respiratory with Gram Stain [[id number]] Collected:  12/17/17 1700    Order Status:  Completed Specimen:  Respiratory from Sputum Updated:  12/19/17 0857     Respiratory Culture Normal respiratory dwaine     Gram Stain (Respiratory) <10 epithelial cells per low power field.     Gram Stain (Respiratory) Moderate WBC's     Gram Stain (Respiratory) Moderate Gram positive cocci     Gram Stain (Respiratory) Many Gram negative diplococci     Gram Stain (Respiratory) Moderate Gram negative rods    Blood culture [[id number]] Collected:  12/17/17 1531    Order Status:  Completed Specimen:  Blood from  Peripheral, Wrist, Right Updated:  12/18/17 1812     Blood Culture, Routine No Growth to date     Blood Culture, Routine No Growth to date    Urine culture [[id number]] Collected:  12/17/17 1318    Order Status:  Completed Specimen:  Urine from Urine, Catheterized Updated:  12/18/17 1731     Urine Culture, Routine No growth          I have reviewed all pertinent labs within the past 24 hours.    Estimated Creatinine Clearance: 73.3 mL/min (based on SCr of 1.7 mg/dL (H)).          Assessment and Plan:     53 yo AAM with cardiogenic shock transferred for further care- has longstanding chronic combined HF, NICM,tobacco and etOH use, prior CTED (DVT/PE), stroke- transferred with IABP, DA and NE- shock associated with acute liver and renal failure, AF with RVR    * Cardiogenic shock    - IABP in place augmenting well @1:1. Decreased to 1:2 with no change in svo2 or vitals. BP's in 140's systolic today. Was planning to remove IABP but due to patients coughing and blood tinged sputum will hold off for now. Will plan to decrease support back to 1:2 3AM tonight to prepare for removal tomorrow pending lab stability  - was planning on starting afterload reduction today but will hold off in light of patient vomiting. Will start post IABP removal if patient tolerates. IABP placed at OSH so must be pulled by our staff or advanced fellow  - sVO2 75% (concern for sepsis yesterday but per ID bacillus in Bcx most likely contaminant)  - Has been weaned off Epi, vaso, and Ashleigh  - Currently too sick for advanced options        Thrombocytopenia    - Platelet count has dropped to 93K on fixed dose Heparin (down by more than half). Heparin gtt D/C'd yesterday, HIT -negative. Heparin was restarted this AM but again stopped as patient was experiencing thick brownish sputum with some streaks of blood        Chest congestion    - Scattered coarse rhonchi bilaterally, sputum fairly clear. Focal consolidation RLL by CXR. Sputum culture with normal  respiratory dwaine  - Aggressive pulmonary toilet  - repeat CXR today        Bacteremia    - Afebrile and no leukocytosis, but cultures obtained in the setting of high sVO2. Blood cultures with GPR - ID consulted. Per ID believe this is a contaminant and do not recommend abx.  - Patient remains afebrile with no leuks today. Cough with much productive sputum with some blood this afternoon. Continue guaifenesin.         Acute liver failure without hepatic coma    - Appreciate Hepatology's help. Most likely ischemic  - Total bili has climbed to 8.3. Transaminases are trending down  - Hepatic US with dopplers complete, homogenous liver, hepatomegaly with trace ascites  - Hepatology plans to determine baseline liver injury from ETOH abuse as outpatient        ARF (acute renal failure)    - Nephrology following, appreciate assistance   - CVP 10 this AM  - Tolerating CRRT with , -3L overnight, some UOP overnight but none since 12 noon today          Acute pulmonary embolism    - H/O left PE 2014 - was on Xarelto but stopped taking. CTA chest 12/14/17 at [facility] showed possible small emboli within the basal segmental branches of the RLL (limited by motion artifact). No large central PE  - INR 2.0. Fixed dose Heparin D/C'd 2/2 drop in platelet count yesterday, restarted now that IABP is at 1:2 and HIT was -oksana        Atrial fibrillation    - Appreciate EP's help. In and out of A fib, rate <130  - Continue Digoxin per rec  - INR 2. Heparin gtt restarted as HIT -negative (then discontinued in afternoon due to hematemesis)            Emma Hook PANikiC  Heart Transplant  [facility]

## 2017-12-19 NOTE — SUBJECTIVE & OBJECTIVE
Interval History: Feels OK other than productive cough. Denies chest pain, SOB, NVD. Family at bedside.     Continuous Infusions:   epinephrine infusion Stopped (12/18/17 1521)    heparin (porcine) in 5 % dex 600 Units/hr (12/19/17 1106)    vasopressin (PITRESSIN) infusion Stopped (12/18/17 0000)     Scheduled Meds:   albuterol-ipratropium 2.5mg-0.5mg/3mL  3 mL Nebulization Q6H    clopidogrel  75 mg Oral Daily    diazePAM  5 mg Oral Q8H    [START ON 12/20/2017] digoxin  0.125 mg Oral Q72H    folic acid  1 mg Oral Daily    guaiFENesin  600 mg Oral BID    hydrALAZINE  25 mg Oral Q8H    isosorbide dinitrate  20 mg Oral TID    multivitamin  1 tablet Oral Daily    senna-docusate 8.6-50 mg  1 tablet Oral BID    sodium chloride 0.9%  3 mL Intravenous Q8H    thiamine  100 mg Oral Daily     PRN Meds:sodium chloride 0.9%, sodium chloride, acetaminophen, atropine, dextrose 50%, dextrose 50%, glucagon (human recombinant), glucose, glucose, hydrocodone-acetaminophen 5-325mg, insulin aspart, lorazepam, magnesium sulfate IVPB, morphine, ondansetron, ondansetron, oxyCODONE, potassium, sodium phosphates, potassium, sodium phosphates, potassium, sodium phosphates, sodium chloride 0.9%    Review of patient's allergies indicates:  No Known Allergies  Objective:     Vital Signs (Most Recent):  Temp: 97.9 °F (36.6 °C) (12/19/17 1100)  Pulse: (!) 137 (12/19/17 1415)  Resp: (!) 37 (12/19/17 1415)  BP: 111/67 (12/16/17 0315)  SpO2: (!) 92 % (12/19/17 1412) Vital Signs (24h Range):  Temp:  [97.6 °F (36.4 °C)-98.9 °F (37.2 °C)] 97.9 °F (36.6 °C)  Pulse:  [] 137  Resp:  [] 37  SpO2:  [91 %-98 %] 92 %  Arterial Line BP: (105-152)/(51-79) 145/79     No data found.    Body mass index is 35.66 kg/m².      Intake/Output Summary (Last 24 hours) at 12/19/17 1453  Last data filed at 12/19/17 1100   Gross per 24 hour   Intake          4802.83 ml   Output             7872 ml   Net         -3069.17 ml     Hemodynamic  Parameters:    Telemetry: Currently in Afib    Physical Exam   Constitutional: He is oriented to person, place, and time. He appears well-developed and well-nourished.   HENT:   Head: Normocephalic and atraumatic.   Eyes: Conjunctivae and EOM are normal. Pupils are equal, round, and reactive to light.   Neck: Normal range of motion. Neck supple.   RIJ trialysis cath   Cardiovascular:   Irregularly irregular except ectopi; + S3. Pedal pulses audible with doppler   Pulmonary/Chest: Effort normal.   Scattered coarse rhonchi   Abdominal: Soft. Bowel sounds are normal.   Musculoskeletal:   4+ edema mid back down; no cyanosis   Neurological: He is alert and oriented to person, place, and time.   Skin: Skin is warm and dry. Capillary refill takes 2 to 3 seconds.   Psychiatric: He has a normal mood and affect. His behavior is normal. Judgment and thought content normal.       Significant Labs:  CBC:    Recent Labs  Lab 12/18/17  0301 12/18/17  0826 12/19/17  0414 12/19/17  1351   WBC 7.40  --  8.64 10.42   RBC 4.13*  --  4.40* 4.56*   HGB 12.4*  --  13.0* 13.6*   HCT 36.1* 33* 37.5* 38.8*   PLT 93*  --  91* 78*   MCV 87  --  85 85   MCH 30.0  --  29.5 29.8   MCHC 34.3  --  34.7 35.1     BNP:    Recent Labs  Lab 12/14/17  0740 12/16/17  2257   * 671*     CMP:    Recent Labs  Lab 12/18/17  0301  12/18/17  2301 12/19/17  0414 12/19/17  1351   GLU 76  76  < > 67* 74  74 90  90   CALCIUM 6.8*  6.8*  < > 8.2* 8.6*  8.6* 8.7  8.7   ALBUMIN 2.0*  2.0*  < > 2.0* 2.0*  2.0* 2.0*  2.0*   PROT 4.6*  --   --  4.7* 4.9*   *  127*  < > 133* 134*  134* 134*  134*   K 4.1  4.1  < > 4.4 4.2  4.2 4.9  4.9   CO2 22*  22*  < > 27 26  26 24  24   CL 95  95  < > 100 101  101 103  103   BUN 25*  25*  < > 10 7  7 9  9   CREATININE 3.6*  3.6*  < > 2.0* 1.5*  1.5* 1.7*  1.7*   ALKPHOS 192*  --   --  233* 235*   ALT 2,188*  --   --  1,544* 1,380*   AST 2,945*  --   --  1,458* 1,093*   BILITOT 5.7*  --   --  8.3*  8.6*   < > = values in this interval not displayed.   Coagulation:     Recent Labs  Lab 12/16/17  2257 12/17/17  0527 12/18/17  0301 12/19/17  0414   INR 5.6* 3.6* 2.6* 2.0*   APTT 36.1*  --  35.5*  --      LDH:  No results for input(s): LDH in the last 72 hours.  Microbiology:  Microbiology Results (last 7 days)     Procedure Component Value Units Date/Time    Culture, Respiratory with Gram Stain [[id number]] Collected:  12/19/17 1352    Order Status:  Sent Specimen:  Respiratory from Sputum, Expectorated Updated:  12/19/17 1439    Blood culture [[id number]] Collected:  12/17/17 1350    Order Status:  Completed Specimen:  Blood from Peripheral, Antecubital, Left Updated:  12/19/17 0956     Blood Culture, Routine Gram stain aer bottle: Gram positive rods      Blood Culture, Routine Gram stain pilo bottle: Gram positive rods      Blood Culture, Routine Results called to and read back by:Jama Palomino RN 12/18/2017  04:53     Blood Culture, Routine --     BACILLUS SPECIES  Organism is a probable contaminant      Culture, Respiratory with Gram Stain [[id number]] Collected:  12/17/17 1700    Order Status:  Completed Specimen:  Respiratory from Sputum Updated:  12/19/17 0857     Respiratory Culture Normal respiratory dwaine     Gram Stain (Respiratory) <10 epithelial cells per low power field.     Gram Stain (Respiratory) Moderate WBC's     Gram Stain (Respiratory) Moderate Gram positive cocci     Gram Stain (Respiratory) Many Gram negative diplococci     Gram Stain (Respiratory) Moderate Gram negative rods    Blood culture [[id number]] Collected:  12/17/17 1531    Order Status:  Completed Specimen:  Blood from Peripheral, Wrist, Right Updated:  12/18/17 1812     Blood Culture, Routine No Growth to date     Blood Culture, Routine No Growth to date    Urine culture [[id number]] Collected:  12/17/17 1318    Order Status:  Completed Specimen:  Urine from Urine, Catheterized Updated:  12/18/17 1731     Urine Culture, Routine No growth           I have reviewed all pertinent labs within the past 24 hours.    Estimated Creatinine Clearance: 73.3 mL/min (based on SCr of 1.7 mg/dL (H)).

## 2017-12-19 NOTE — SUBJECTIVE & OBJECTIVE
Interval History:   Continues on SLED this morning, tolerating well, net negative 3.5L/24h.  Electrolytes stable.  Able to be weaned from epi discontinued this morning.  Plans to remove IABP today.    Review of patient's allergies indicates:  No Known Allergies  Current Facility-Administered Medications   Medication Frequency    0.9%  NaCl infusion (CRRT USE ONLY) PRN    0.9%  NaCl infusion (for blood administration) Q24H PRN    acetaminophen tablet 650 mg Q4H PRN    albuterol-ipratropium 2.5mg-0.5mg/3mL nebulizer solution 3 mL Q6H    atropine injection 0.5 mg PRN    clopidogrel tablet 75 mg Daily    dextrose 50% injection 12.5 g PRN    dextrose 50% injection 25 g PRN    diazePAM tablet 5 mg Q8H    [START ON 12/20/2017] digoxin tablet 0.125 mg Q72H    EPINEPHrine (ADRENALIN) 4 mg in sodium chloride 0.9% 250 mL infusion Continuous    folic acid tablet 1 mg Daily    glucagon (human recombinant) injection 1 mg PRN    glucose chewable tablet 16 g PRN    glucose chewable tablet 24 g PRN    guaiFENesin 12 hr tablet 600 mg BID    heparin 25,000 units in dextrose 5% 250 mL (100 units/mL) infusion (heparin infusion) Continuous    hydrocodone-acetaminophen 5-325mg per tablet 1 tablet Q4H PRN    insulin aspart pen 1-10 Units QID (AC + HS) PRN    lorazepam injection 1 mg Q4H PRN    magnesium sulfate 2g in water 50mL IVPB (premix) PRN    morphine injection 2 mg Q10 Min PRN    multivitamin tablet 1 tablet Daily    ondansetron injection 4 mg Q8H PRN    ondansetron injection 4 mg Q12H PRN    oxyCODONE immediate release tablet 5 mg Q4H PRN    potassium, sodium phosphates 280-160-250 mg packet 2 packet PRN    potassium, sodium phosphates 280-160-250 mg packet 2 packet PRN    potassium, sodium phosphates 280-160-250 mg packet 2 packet PRN    senna-docusate 8.6-50 mg per tablet 1 tablet BID    sodium chloride 0.9% bolus 250 mL PRN    sodium chloride 0.9% flush 3 mL Q8H    thiamine tablet 100 mg Daily     vasopressin (PITRESSIN) 0.2 Units/mL in dextrose 5 % 100 mL infusion Continuous       Objective:     Vital Signs (Most Recent):  Temp: 97.6 °F (36.4 °C) (12/19/17 0700)  Pulse: (!) 124 (12/19/17 0757)  Resp: (!) 28 (12/19/17 0757)  BP: 111/67 (12/16/17 0315)  SpO2: 97 % (12/19/17 0932)  O2 Device (Oxygen Therapy): room air (12/19/17 0757) Vital Signs (24h Range):  Temp:  [97.6 °F (36.4 °C)-98.9 °F (37.2 °C)] 97.6 °F (36.4 °C)  Pulse:  [] 124  Resp:  [] 28  SpO2:  [91 %-98 %] 97 %  Arterial Line BP: ()/(49-69) 129/63     Weight: 126 kg (277 lb 12.5 oz) (12/16/17 0315)  Body mass index is 35.66 kg/m².  Body surface area is 2.56 meters squared.    I/O last 3 completed shifts:  In: 9970.2 [P.O.:1190; I.V.:8580.2; IV Piggyback:200]  Out: 27252 [Urine:205; Other:61397]    Physical Exam   Constitutional: He is oriented to person, place, and time. He appears well-developed and well-nourished.   HENT:   Head: Normocephalic and atraumatic.   Eyes: Conjunctivae and EOM are normal.   Neck: Neck supple. No tracheal deviation present. No thyromegaly present.   Cardiovascular: Regular rhythm.  Tachycardia present.    Pulmonary/Chest: Effort normal. He has no wheezes. He has rhonchi.   Abdominal: Soft. There is no tenderness. There is no guarding.   Musculoskeletal: He exhibits edema (2+). He exhibits no deformity.   Lymphadenopathy:     He has no cervical adenopathy.   Neurological: He is alert and oriented to person, place, and time.   Skin: Skin is warm and dry. He is not diaphoretic.   Psychiatric: His behavior is normal. Judgment normal.       Significant Labs:  CBC:   Recent Labs  Lab 12/19/17  0414   WBC 8.64   RBC 4.40*   HGB 13.0*   HCT 37.5*   PLT 91*   MCV 85   MCH 29.5   MCHC 34.7     CMP:   Recent Labs  Lab 12/19/17  0414   GLU 74  74   CALCIUM 8.6*  8.6*   ALBUMIN 2.0*  2.0*   PROT 4.7*   *  134*   K 4.2  4.2   CO2 26  26     101   BUN 7  7   CREATININE 1.5*  1.5*   ALKPHOS  233*   ALT 1,544*   AST 1,458*   BILITOT 8.3*

## 2017-12-19 NOTE — PLAN OF CARE
Problem: Patient Care Overview  Goal: Plan of Care Review  Outcome: Ongoing (interventions implemented as appropriate)  No acute events throughout day, VS and assessment per flow sheet, Epi gtt and nitric discontinued, IABP advanced and remains 1:1 Pressure Trigger augmenting 90-110s, patient progressing towards goals as tolerated, plan of care reviewed with Altaf Guo and family, all concerns addressed, will continue to monitor.

## 2017-12-19 NOTE — ASSESSMENT & PLAN NOTE
- Appreciate EP's help. In and out of A fib, rate <130  - Continue Digoxin per rec  - INR 2. Heparin gtt restarted as HIT -negative (then discontinued in afternoon due to hematemesis)

## 2017-12-19 NOTE — ASSESSMENT & PLAN NOTE
- Scattered coarse rhonchi bilaterally, sputum fairly clear. Focal consolidation RLL by CXR. Sputum culture with normal respiratory dwaine  - Aggressive pulmonary toilet  - repeat CXR today

## 2017-12-19 NOTE — ASSESSMENT & PLAN NOTE
- Platelet count has dropped to 93K on fixed dose Heparin (down by more than half). Heparin gtt D/C'd yesterday, HIT -negative. Heparin was restarted this AM but again stopped as patient was experiencing thick brownish sputum with some streaks of blood

## 2017-12-19 NOTE — PROGRESS NOTES
Dr. Tong at bedside. Updated patients daughter and wife on plan of care. New orders placed. WIll continue to monitor and carry out orders.

## 2017-12-20 PROBLEM — K56.7 ILEUS: Status: ACTIVE | Noted: 2017-01-01

## 2017-12-20 PROBLEM — N19 RENAL FAILURE SYNDROME: Status: ACTIVE | Noted: 2017-01-01

## 2017-12-20 NOTE — SUBJECTIVE & OBJECTIVE
Interval History: Continues to have chest congestion with a productive cough, and is now requiring 50% VM. HGT has put out 2100 cc gastric contents since placed ~ 14 hours ago; no bowel sounds. Back in A fib with RVR so Esmolol gtt started earlier this morning, but his BP did not tolerate. Remains oliguric. CVP 8-9. US currently 200    Continuous Infusions:   epinephrine infusion Stopped (12/18/17 1521)    esmolol 50 mcg/kg/min (12/20/17 1100)    heparin (porcine) in 5 % dex 600 Units/hr (12/20/17 1100)    vasopressin (PITRESSIN) infusion Stopped (12/18/17 0000)     Scheduled Meds:   albuterol-ipratropium 2.5mg-0.5mg/3mL  3 mL Nebulization Q6H    diazePAM  5 mg Intravenous Q8H    sodium chloride 0.9%  3 mL Intravenous Q8H     PRN Meds:sodium chloride 0.9%, sodium chloride, acetaminophen, atropine, dextrose 50%, dextrose 50%, glucagon (human recombinant), glucose, glucose, hydrocodone-acetaminophen 5-325mg, insulin aspart, lorazepam, magnesium sulfate IVPB, morphine, ondansetron, ondansetron, oxyCODONE, potassium, sodium phosphates, potassium, sodium phosphates, potassium, sodium phosphates, sodium chloride 0.9%    Review of patient's allergies indicates:  No Known Allergies  Objective:     Vital Signs (Most Recent):  Temp: 99.4 °F (37.4 °C) (12/20/17 1100)  Pulse: (!) 149 (12/20/17 1100)  Resp: (!) 29 (12/20/17 1100)  BP: 111/67 (12/16/17 0315)  SpO2: 100 % (12/20/17 1100) Vital Signs (24h Range):  Temp:  [96.7 °F (35.9 °C)-99.4 °F (37.4 °C)] 99.4 °F (37.4 °C)  Pulse:  [128-170] 149  Resp:  [18-37] 29  SpO2:  [31 %-100 %] 100 %  Arterial Line BP: (100-154)/(46-79) 116/66     No data found.    Body mass index is 35.66 kg/m².      Intake/Output Summary (Last 24 hours) at 12/20/17 1113  Last data filed at 12/20/17 1100   Gross per 24 hour   Intake          4014.34 ml   Output            68246 ml   Net         -7159.66 ml       Hemodynamic Parameters:  CVP:  [10 mmHg] 10 mmHg    Telemetry: A fib with  RVR    Physical Exam   Constitutional: He appears well-developed and well-nourished.   HENT:   Head: Normocephalic and atraumatic.   Eyes: Conjunctivae and EOM are normal. Pupils are equal, round, and reactive to light.   Neck: Normal range of motion. Neck supple.   RIJ trialysis cath   Cardiovascular:   RRR except ectopi; + S3. Pedal pulses audible with doppler   Pulmonary/Chest: Effort normal.   Scattered coarse rhonchi   Abdominal: Soft. Bowel sounds are normal.   Musculoskeletal:   4+ edema mid back down; no cyanosis   Neurological:   Lethargic. Arouses to voice and answers some questions appropriately   Skin: Skin is warm and dry. Capillary refill takes 2 to 3 seconds.       Significant Labs:  CBC:    Recent Labs  Lab 12/19/17  1351 12/19/17  1729 12/20/17  0442   WBC 10.42 9.51 11.34   RBC 4.56* 4.62 4.49*   HGB 13.6* 13.6* 13.1*   HCT 38.8* 40.4 38.3*   PLT 78* 63* 68*   MCV 85 87 85   MCH 29.8 29.4 29.2   MCHC 35.1 33.7 34.2     BNP:    Recent Labs  Lab 12/14/17  0740 12/16/17  2257   * 671*     CMP:    Recent Labs  Lab 12/19/17  0414 12/19/17  1351 12/19/17  2243 12/20/17  0442   GLU 74  74 90  90 93 83  83   CALCIUM 8.6*  8.6* 8.7  8.7 8.7 9.0  9.0   ALBUMIN 2.0*  2.0* 2.0*  2.0* 1.9* 2.0*  2.0*   PROT 4.7* 4.9*  --  4.9*   *  134* 134*  134* 135* 136  136   K 4.2  4.2 4.9  4.9 4.7 4.5  4.5   CO2 26  26 24  24 29 29  29     101 103  103 102 102  102   BUN 7  7 9  9 9 8  8   CREATININE 1.5*  1.5* 1.7*  1.7* 1.5* 1.4  1.4   ALKPHOS 233* 235*  --  230*   ALT 1,544* 1,380*  --  1,099*   AST 1,458* 1,093*  --  689*   BILITOT 8.3* 8.6*  --  8.6*      Coagulation:     Recent Labs  Lab 12/16/17  2257  12/18/17  0301 12/19/17  0414 12/20/17  0442   INR 5.6*  < > 2.6* 2.0* 1.9*   APTT 36.1*  --  35.5*  --   --    < > = values in this interval not displayed.  LDH:  No results for input(s): LDH in the last 72 hours.  Microbiology:  Microbiology Results (last 7 days)      Procedure Component Value Units Date/Time    Culture, Respiratory with Gram Stain [[id number]] Collected:  12/19/17 1352    Order Status:  Completed Specimen:  Respiratory from Sputum, Expectorated Updated:  12/20/17 0021     Gram Stain (Respiratory) <10 epithelial cells per low power field.     Gram Stain (Respiratory) Rare WBC's     Gram Stain (Respiratory) Rare Gram positive cocci     Gram Stain (Respiratory) Rare Gram negative rods     Gram Stain (Respiratory) Rare Gram negative diplococci    Blood culture [[id number]] Collected:  12/17/17 1531    Order Status:  Completed Specimen:  Blood from Peripheral, Wrist, Right Updated:  12/19/17 1812     Blood Culture, Routine No Growth to date     Blood Culture, Routine No Growth to date     Blood Culture, Routine No Growth to date    Blood culture [[id number]] Collected:  12/17/17 1350    Order Status:  Completed Specimen:  Blood from Peripheral, Antecubital, Left Updated:  12/19/17 0956     Blood Culture, Routine Gram stain aer bottle: Gram positive rods      Blood Culture, Routine Gram stain pilo bottle: Gram positive rods      Blood Culture, Routine Results called to and read back by:Jama Palomino RN 12/18/2017  04:53     Blood Culture, Routine --     BACILLUS SPECIES  Organism is a probable contaminant      Culture, Respiratory with Gram Stain [[id number]] Collected:  12/17/17 1700    Order Status:  Completed Specimen:  Respiratory from Sputum Updated:  12/19/17 0857     Respiratory Culture Normal respiratory dwaine     Gram Stain (Respiratory) <10 epithelial cells per low power field.     Gram Stain (Respiratory) Moderate WBC's     Gram Stain (Respiratory) Moderate Gram positive cocci     Gram Stain (Respiratory) Many Gram negative diplococci     Gram Stain (Respiratory) Moderate Gram negative rods    Urine culture [[id number]] Collected:  12/17/17 1318    Order Status:  Completed Specimen:  Urine from Urine, Catheterized Updated:  12/18/17 1731     Urine Culture, Routine  No growth          I have reviewed all pertinent labs within the past 24 hours.    Estimated Creatinine Clearance: 89 mL/min (based on SCr of 1.4 mg/dL).

## 2017-12-20 NOTE — ASSESSMENT & PLAN NOTE
- Appreciate EP's help. Back in A fib with RVR. Intolerant of Esmolol with hypotension  - INR 2. Heparin gtt restarted as HIT -negative (then discontinued yesterday due to hematemesis)

## 2017-12-20 NOTE — ASSESSMENT & PLAN NOTE
- Afebrile and no leukocytosis, but cultures obtained in the setting of high sVO2. Blood cultures with GPR - ID consulted. Per ID believe this is a contaminant and do not recommend abx.  - Patient remains afebrile with no leuks today.

## 2017-12-20 NOTE — ASSESSMENT & PLAN NOTE
- Patient declining, now in A fib with RVR and intolerant of Esmolol with hypotension. Remains oliguric. Total bili climbing. Has ileus. Requiring supplemental O2. Discussion held with wife and daughter regarding grim prognosis, and decision may to make patient DNR/DNI  - Continue IABP at 1:1 for now. Coughing may make him bleed if removed.

## 2017-12-20 NOTE — PLAN OF CARE
Problem: Patient Care Overview  Goal: Plan of Care Review  Outcome: Ongoing (interventions implemented as appropriate)  No acute events throughout day. See vital signs and assessments in flowsheets. See below for updates on today's progress.     Pulmonary: patient on 12L 50% venti mask, O2 sat >95% throughout shift. Frequent productive cough, however patient does cooperate with being suctioned [bites down on suction tubing]. Scopolamine patched applied to help decrease secretions.    Cardiovascular: A-Flutter RVR. HR ranging from 140-190s on telemetry. Esmolol gtt added this morning, however stopped due to hypotension. Palpable radial pulses, pedal & post-tibial verified with doppler. IABP 1:2 pressure trigger, inability to wean at this time. CVP 10-11.    Neurological: Withdrawn. Awakens to voice and follows simple commands.    Gastrointestinal: NG to LIS. 2.7L of thin brown removed this shift.    Genitourinary: CRRT continued, UF at goal of 200. Laura removed this morning.    Endocrine: blood glucose monitored q6h.    Integumentary/Other: no skin breakdown noted. Patient now DNR/DNI. HTS spoke with family this afternoon, will continue course with no escalation. Ongoing support to family at bedside.    Infusions: heparin, KVO    Patient progressing towards goals as tolerated, plan of care communicated and reviewed with spouse and family at bedside. All concerns addressed. Will continue to monitor.

## 2017-12-20 NOTE — ASSESSMENT & PLAN NOTE
52 y.o. male with multiple medical comorbidities currently with cardiogenic shock complicated by resolving ALI and GULSHAN on CRRT, now with acute gastric dilation.  -18F NG tube placed with return of large amount of air and 300cc thick gastric contents; marked improved abdominal distension  -Follow up KUB  -Correct electrolyte abnormalities; keep Mg>2, Phos>3, K>4, normocalcemia  -Strict NPO  -maintenance IV fluids for third spacing; if large GI losses, please replace  -please call for any changes in abdominal exam / complaints of abdominal pain  -will follow

## 2017-12-20 NOTE — SUBJECTIVE & OBJECTIVE
Interval History: Had ~2100mL from NGT since it has been placed. Stomach decompressed on imaging.     Medications:  Continuous Infusions:   epinephrine infusion Stopped (12/18/17 1521)    esmolol 50 mcg/kg/min (12/20/17 1000)    heparin (porcine) in 5 % dex 600 Units/hr (12/20/17 1000)    vasopressin (PITRESSIN) infusion Stopped (12/18/17 0000)     Scheduled Meds:   albuterol-ipratropium 2.5mg-0.5mg/3mL  3 mL Nebulization Q6H    diazePAM  5 mg Intravenous Q8H    sodium chloride 0.9%  3 mL Intravenous Q8H     PRN Meds:sodium chloride 0.9%, sodium chloride, acetaminophen, atropine, dextrose 50%, dextrose 50%, glucagon (human recombinant), glucose, glucose, hydrocodone-acetaminophen 5-325mg, insulin aspart, lorazepam, magnesium sulfate IVPB, morphine, ondansetron, ondansetron, oxyCODONE, potassium, sodium phosphates, potassium, sodium phosphates, potassium, sodium phosphates, sodium chloride 0.9%     Review of patient's allergies indicates:  No Known Allergies  Objective:     Vital Signs (Most Recent):  Temp: 98.4 °F (36.9 °C) (12/20/17 0734)  Pulse: (!) 135 (12/20/17 1000)  Resp: (!) 25 (12/20/17 1000)  BP: 111/67 (12/16/17 0315)  SpO2: 100 % (12/20/17 1000) Vital Signs (24h Range):  Temp:  [96.7 °F (35.9 °C)-98.4 °F (36.9 °C)] 98.4 °F (36.9 °C)  Pulse:  [128-170] 135  Resp:  [18-37] 25  SpO2:  [31 %-100 %] 100 %  Arterial Line BP: (100-154)/(46-79) 101/54     Weight: 126 kg (277 lb 12.5 oz)  Body mass index is 35.66 kg/m².    Intake/Output - Last 3 Shifts       12/18 0700 - 12/19 0659 12/19 0700 - 12/20 0659 12/20 0700 - 12/21 0659    P.O. 710      I.V. (mL/kg) 5308.8 (42.1) 3495.4 (27.7) 884 (7)    IV Piggyback 100      Total Intake(mL/kg) 6118.8 (48.6) 3495.4 (27.7) 884 (7)    Urine (mL/kg/hr) 125 (0) 95 (0) 5 (0)    Drains  2100 (0.7) 900 (1.8)    Other 9358 (3.1) 7838 (2.6) 1402 (2.8)    Total Output 9483 01576 230    Net -3364.2 -6537.6 -1423                 Physical Exam   Constitutional: He appears  well-developed and well-nourished.   HENT:   Head: Normocephalic and atraumatic.   Eyes: Scleral icterus is present.   Neck: Normal range of motion. Neck supple.   Cardiovascular: An irregular rhythm present. Tachycardia present.    Pulmonary/Chest: Effort normal. No respiratory distress.   Abdominal: Soft. He exhibits no distension and no mass. There is no guarding. No hernia.   Bowel sounds present; no significant tenderness this morning   Neurological:   Awakens to voice and answers some questions   Skin: Skin is warm.       Significant Labs:  CBC:   Recent Labs  Lab 12/20/17 0442   WBC 11.34   RBC 4.49*   HGB 13.1*   HCT 38.3*   PLT 68*   MCV 85   MCH 29.2   MCHC 34.2     CMP:   Recent Labs  Lab 12/20/17 0442   GLU 83  83   CALCIUM 9.0  9.0   ALBUMIN 2.0*  2.0*   PROT 4.9*     136   K 4.5  4.5   CO2 29  29     102   BUN 8  8   CREATININE 1.4  1.4   ALKPHOS 230*   ALT 1,099*   *   BILITOT 8.6*       Significant Diagnostics:  I have reviewed all pertinent imaging results/findings within the past 24 hours.

## 2017-12-20 NOTE — ASSESSMENT & PLAN NOTE
- Appreciate Hepatology's help. Most likely ischemic  - Total bili has climbed to 8.6. Transaminases are slowly trending down  - Hepatic US with dopplers complete, homogenous liver, hepatomegaly with trace ascites  - Hepatology planned to determine baseline liver injury from ETOH abuse as outpatient

## 2017-12-20 NOTE — HPI
52 y.o. male with history of longstanding chronic combined HF, NICM, tobacco and etOH use, prior DVT and stroke currently admitted to [facility] since 12/15/2017 with cardiogenic shock with IABP in place associated with acute liver and renal failure, AF with RVR. Over the past few days his LFTs have been improving and he has been weaned off pressor support with plans for balloon pump removal in near future. Is on a heparin gtt which was held today for some concern regarding hemoptysis.   Surgery consulted today for abdominal distension, nausea and one episode of emesis leading to a KUB revealing acute gastric distension.  The patient currently is also on a valium taper making conversation / history difficult. Denies abdominal pain and is not currently nauseated. Last BM recorded 12/16/2017. Prior to emesis had been tolerating a diet.

## 2017-12-20 NOTE — ASSESSMENT & PLAN NOTE
52 y.o. male with multiple medical comorbidities currently with cardiogenic shock complicated by resolving ALI and GULSHAN on CRRT, now with acute gastric dilation that has improved after appropriate decompression    - Continue to keep NGT in place and to LWIS  - Correct electrolyte abnormalities; keep Mg>2, Phos>3, K>4, normocalcemia  - Strict NPO  - Please replace GI losses; will third space with ileus  - Will continue to follow; please call with questions

## 2017-12-20 NOTE — ASSESSMENT & PLAN NOTE
- Nephrology following, appreciate assistance   - CVP 8-9 this AM  - CRRT with current UF of 200. Remains oliguirc

## 2017-12-20 NOTE — PROGRESS NOTES
[facility]  Nephrology  Progress Note    Patient Name: Altaf Guo  MRN: [id number]  Admission Date: 12/14/2017  Hospital Length of Stay: 6 days  Attending Provider: Tu Tong MD   Primary Care Physician: [facility]  Principal Problem:Cardiogenic shock    Subjective:     HPI: Mr. Guo is a 53 yo AAM with HFrEF (previously 15%), HTN, T2DM, afib, alcoholic hepatitis, DVT/PE no longer on Xarelto (couldn't afford), and embolic stroke who presented to [facility] on 12/14 with BLE edema, TURNER, and cough. Symptoms started 2 weeks prior to presentation and have worsened over time. He had stopped taking his beta-blocker around the time of symptom onset. CTA was negative for PE. Workup was consistent with cardiogenic shock; IABP was placed and patient was transferred to [facility]. Patient was reportedly anuric at OSH and was evaluated by nephrology. Renal US was unremarkable. IVF were recommended (he received 1.5L NS in addition to continuous IVF) and dialysis was not offered due to poor candidacy. Upon transfer patient received lasix 80mg IV x1 without response. We also tried lasix 160mg IV; also without response. UOP only 125cc since arrival. He is net +2.7L/24h. He is currently receiving dopamine, epi, norepi, heparin, and nitric oxide infusions. He is on 4L NC. He has no prior h/o kidney disease. No h/o kidney stones or UTIs. Does report BC powder use about 2 weeks ago. He currently denies any SOB or chest pain. He does admit to feeling overwhelmed by everything that is going on. He is agreeable to proceeding with RRT to help with his cardiac status. Consent signed by his wife and placed in chart.     Interval History:   Acutely decompensated overnight, noted to have increased abdominal distension.  KUB revealed gastric distension.  NGT placed with large amount of output (2100).  Developed A-fib with RVR overnight, HR around 160-180, now DNR/DNI with no escalation of care.   Family members at bedside.  I/O's not accurate overnight, net negative ~ 4L/24h.  Electrolytes stable.  Currently on VM.  Continues on SLED for metabolic clearance.  UF off.    Attempted Esmolol gtt today for HR control, but patient developed worsening hypotension, so gtt discontinued.    Review of patient's allergies indicates:  No Known Allergies  Current Facility-Administered Medications   Medication Frequency    0.9%  NaCl infusion (for blood administration) Q24H PRN    acetaminophen tablet 650 mg Q4H PRN    albuterol-ipratropium 2.5mg-0.5mg/3mL nebulizer solution 3 mL Q6H    atropine injection 0.5 mg PRN    dextrose 50% injection 12.5 g PRN    dextrose 50% injection 25 g PRN    diazePAM injection 5 mg Q8H    EPINEPHrine (ADRENALIN) 4 mg in sodium chloride 0.9% 250 mL infusion Continuous    esmolol 2000 mg in sodium chloride 0.9% 100 mL (20 mg/mL) Continuous    glucagon (human recombinant) injection 1 mg PRN    glucose chewable tablet 16 g PRN    glucose chewable tablet 24 g PRN    heparin 25,000 units in dextrose 5% 250 mL (100 units/mL) infusion (heparin infusion) Continuous    hydrocodone-acetaminophen 5-325mg per tablet 1 tablet Q4H PRN    insulin aspart pen 1-10 Units QID (AC + HS) PRN    lorazepam injection 1 mg Q4H PRN    morphine injection 2 mg Q10 Min PRN    ondansetron injection 4 mg Q8H PRN    ondansetron injection 4 mg Q12H PRN    oxyCODONE immediate release tablet 5 mg Q4H PRN    potassium, sodium phosphates 280-160-250 mg packet 2 packet PRN    potassium, sodium phosphates 280-160-250 mg packet 2 packet PRN    potassium, sodium phosphates 280-160-250 mg packet 2 packet PRN    sodium chloride 0.9% bolus 250 mL PRN    sodium chloride 0.9% flush 3 mL Q8H    vasopressin (PITRESSIN) 0.2 Units/mL in dextrose 5 % 100 mL infusion Continuous       Objective:     Vital Signs (Most Recent):  Temp: 99.4 °F (37.4 °C) (12/20/17 1100)  Pulse: (!) 161 (12/20/17 1300)  Resp: (!) 24  (12/20/17 1300)  BP: 111/67 (12/16/17 0315)  SpO2: 100 % (12/20/17 1300)  O2 Device (Oxygen Therapy): venti mask (12/20/17 1300) Vital Signs (24h Range):  Temp:  [96.7 °F (35.9 °C)-99.4 °F (37.4 °C)] 99.4 °F (37.4 °C)  Pulse:  [134-170] 161  Resp:  [18-37] 24  SpO2:  [31 %-100 %] 100 %  Arterial Line BP: ()/(46-79) 91/48     Weight: 126 kg (277 lb 12.5 oz) (12/16/17 0315)  Body mass index is 35.66 kg/m².  Body surface area is 2.56 meters squared.    I/O last 3 completed shifts:  In: 6435.4 [P.O.:120; I.V.:6315.4]  Out: 70771 [Urine:180; Drains:2100; Other:28911]    Physical Exam   Constitutional: He is oriented to person, place, and time. He appears well-developed and well-nourished. He appears lethargic. He appears distressed. Face mask in place.   HENT:   Head: Normocephalic and atraumatic.   Eyes: Conjunctivae and EOM are normal.   Neck: Neck supple. No tracheal deviation present. No thyromegaly present.   Cardiovascular: An irregularly irregular rhythm present. Tachycardia present.    Pulmonary/Chest: Tachypnea noted. He has no wheezes. He has rhonchi.   Abdominal: Soft. There is no tenderness. There is no guarding.   Musculoskeletal: He exhibits edema (2+). He exhibits no deformity.   Lymphadenopathy:     He has no cervical adenopathy.   Neurological: He is oriented to person, place, and time. He appears lethargic.   Skin: Skin is warm and dry. He is not diaphoretic.   Psychiatric: His behavior is normal. Judgment normal.       Significant Labs:  CBC:   Recent Labs  Lab 12/20/17 0442   WBC 11.34   RBC 4.49*   HGB 13.1*   HCT 38.3*   PLT 68*   MCV 85   MCH 29.2   MCHC 34.2     CMP:   Recent Labs  Lab 12/20/17  0442   GLU 83  83   CALCIUM 9.0  9.0   ALBUMIN 2.0*  2.0*   PROT 4.9*     136   K 4.5  4.5   CO2 29  29     102   BUN 8  8   CREATININE 1.4  1.4   ALKPHOS 230*   ALT 1,099*   *   BILITOT 8.6*            Assessment/Plan:     ARF (acute renal failure)    - baseline sCr  0.9-1.1  - likely ischemic ATN from cardiogenic shock. Also received IV contrast on 12/14 which will likely delay renal recovery.     -continues on SLED, acutely decompensated overnight.  I/O's not accurate, ~ 3L net negative /24h.  -large amount of volume from NG tube.  CVP 10 this morning.  -will continue SLED for metabolic clearance.  Will decrease UF to 200 cc/hr.  Will discontinue treatment if patient becomes hemodynamically unstable.          Yung Dwyer NP  Nephrology  [facility]  Pager:  [phone]

## 2017-12-20 NOTE — ASSESSMENT & PLAN NOTE
- H/O left PE 2014 - was on Xarelto but stopped taking. CTA chest 12/14/17 at [facility] showed possible small emboli within the basal segmental branches of the RLL (limited by motion artifact). No large central PE  - INR 1.9. Heparin gtt off 2/2 hematemesis yesterday

## 2017-12-20 NOTE — PROGRESS NOTES
IABP 1:2. Anti-Xa drawn with result <0.10. Spoke with Dr. Mendoza, orders to increase heparin gtt to 800units/hr and redraw lab in 6 hours.

## 2017-12-20 NOTE — PROGRESS NOTES
[facility]  Heart Transplant  Progress Note    Patient Name: Atlaf Guo  MRN: [id number]  Admission Date: 12/14/2017  Hospital Length of Stay: 6 days  Attending Physician: Tu Tong MD  Primary Care Provider: [facility]  Principal Problem:Cardiogenic shock    Subjective:   No new subjective & objective note has been filed under this hospital service since the last note was generated.    Assessment and Plan:     53 yo AAM with cardiogenic shock transferred for further care- has longstanding chronic combined HF, NICM,tobacco and etOH use, prior CTED (DVT/PE), stroke- transferred with IABP, DA and NE- shock associated with acute liver and renal failure, AF with RVR    * Cardiogenic shock    - Patient declining, now in A fib with RVR and intolerant of Esmolol with hypotension. Remains oliguric. Total bili climbing. Has ileus. Requiring supplemental O2. Discussion held with family regarding grim prognosis, and decision may to make patient DNR/DNI  - Continue IABP at 1:2 for now (tachycardic with HR in the 160's-170's) and not augmenting well on 1:1). Coughing may make him bleed if removed.        Acute liver failure without hepatic coma    - Appreciate Hepatology's help. Most likely ischemic  - Total bili has climbed to 8.6. Transaminases are slowly trending down  - Hepatic US with dopplers complete, homogenous liver, hepatomegaly with trace ascites  - Hepatology planned to determine baseline liver injury from ETOH abuse as outpatient        Bacteremia    - Afebrile and no leukocytosis, but cultures obtained in the setting of high sVO2. Blood cultures with GPR - ID consulted. Per ID believe this is a contaminant and do not recommend abx.  - Patient remains afebrile with no leuks today.         ARF (acute renal failure)    - Nephrology following, appreciate assistance   - CVP 8-9 this AM  - CRRT with current UF of 200. Remains oliguirc          Ileus    - Appreciate Gen Surgery's  help. NGT with large output, no bowel sounds. Keeping NPO        Atrial fibrillation    - Appreciate EP's help. Back in A fib with RVR. Intolerant of Esmolol with hypotension  - INR 1.9. Heparin gtt restarted as HIT -negative and IABP 1:2        Acute pulmonary embolism    - H/O left PE 2014 - was on Xarelto but stopped taking. CTA chest 12/14/17 at [facility] showed possible small emboli within the basal segmental branches of the RLL (limited by motion artifact). No large central PE  - INR 1.9. Continues fixed dose Heparin gtt        Thrombocytopenia    - Platelet count has dropped to 68K. HIT -ve        Chest congestion    - Scattered coarse rhonchi bilaterally, sputum fairly clear. Focal consolidation RLL by CXR. Sputum culture with normal respiratory dwaine  - [place] [id number]  Heart Transplant  [facility]

## 2017-12-20 NOTE — ASSESSMENT & PLAN NOTE
- Patient declining, now in A fib with RVR and intolerant of Esmolol with hypotension. Remains oliguric. Total bili climbing. Has ileus. Requiring supplemental O2. Discussion held with family regarding grim prognosis, and decision may to make patient DNR/DNI  - Continue IABP at 1:2 for now (tachycardic with HR in the 160's-170's) and not augmenting well on 1:1). Coughing may make him bleed if removed.

## 2017-12-20 NOTE — PROGRESS NOTES
Tele monitor alarms Afib. 's - 170's. MD Nails contacted and updated. Pertinent labs and VS trends reviewed. Orders for 12 lead received.    2300 - HR now 150's. MD Nails contacted and updated. No new orders.    0427 - HR now 160's - 190's. MD Nails contacted and updated. Orders received. MD to present to bedside ASAP.    0435 - MD Nails presents to bedside and assesses Pt in presence of RN. MD requests ABG and VBG. MD conferred with attending (MD stated he talked with MD Bentley), no new orders.    0500 - ABG and VBG resulted. MD Nails contacted and results relayed. IABP settings reviewed with MD. MD requests Pt be placed on supplemental O2. Pt placed on venti mask by RT. See flow sheet for O2 administration/venti mask settings.

## 2017-12-20 NOTE — ASSESSMENT & PLAN NOTE
- baseline sCr 0.9-1.1  - likely ischemic ATN from cardiogenic shock. Also received IV contrast on 12/14 which will likely delay renal recovery.     -continues on SLED, acutely decompensated overnight.  I/O's not accurate, ~ 3L net negative /24h.  -large amount of volume from NG tube.  CVP 10 this morning.  -will continue SLED for metabolic clearance.  Will decrease UF to 200 cc/hr.  Will discontinue treatment if patient becomes hemodynamically unstable.

## 2017-12-20 NOTE — ASSESSMENT & PLAN NOTE
- H/O left PE 2014 - was on Xarelto but stopped taking. CTA chest 12/14/17 at [facility] showed possible small emboli within the basal segmental branches of the RLL (limited by motion artifact). No large central PE  - INR 1.9. Continues fixed dose Heparin gtt

## 2017-12-20 NOTE — PROGRESS NOTES
Patient's MAP dropping to low 60s on arterial line, high MAP 60s IABP. Esmolol gtt stopped at this time, HR increasing to 150-170s. Spoke with Dr. Arechiga, updated on current VS change. No orders received at this time, will continue to closely monitor.

## 2017-12-20 NOTE — CONSULTS
[facility]  General Surgery  Consult Note    Patient Name: Altaf Guo  MRN: [id number]  Code Status: Full Code  Admission Date: 12/14/2017  Hospital Length of Stay: 5 days  Attending Physician: Tu Tong MD  Primary Care Provider: [facility]    Patient information was obtained from patient, past medical records and ER records.     Inpatient consult to General Surgery  Consult performed by: GAIL ZAVALA  Consult ordered by: HOLGER SANTA        Subjective:     Principal Problem: Cardiogenic shock    History of Present Illness: 52 y.o. male with history of longstanding chronic combined HF, NICM, tobacco and etOH use, prior DVT and stroke currently admitted to [facility] since 12/15/2017 with cardiogenic shock with IABP in place associated with acute liver and renal failure, AF with RVR. Over the past few days his LFTs have been improving and he has been weaned off pressor support with plans for balloon pump removal in near future. Is on a heparin gtt which was held today for some concern regarding hemoptysis.   Surgery consulted today for abdominal distension, nausea and one episode of emesis leading to a KUB revealing acute gastric distension.  The patient currently is also on a valium taper making conversation / history difficult. Denies abdominal pain and is not currently nauseated. Last BM recorded 12/16/2017. Prior to emesis had been tolerating a diet.    No current facility-administered medications on file prior to encounter.      Current Outpatient Prescriptions on File Prior to Encounter   Medication Sig    acetaminophen (TYLENOL) 325 MG tablet Take 2 tablets (650 mg total) by mouth every 6 (six) hours as needed for Temperature greater than (100.4).    amlodipine (NORVASC) 10 MG tablet Take 10 mg by mouth once daily.    clopidogrel (PLAVIX) 75 mg tablet Take 75 mg by mouth once daily.    folic acid (FOLVITE) 1 MG tablet Take 1 tablet (1 mg total) by mouth once  daily.    FUROSEMIDE (LASIX ORAL) Take 40 mg by mouth once daily.     metoprolol tartrate (LOPRESSOR) 100 MG tablet Take 1 tablet (100 mg total) by mouth 2 (two) times daily.    atorvastatin (LIPITOR) 40 MG tablet Take 1 tablet (40 mg total) by mouth once daily.    nicotine (NICODERM CQ) 21 mg/24 hr Place 1 patch onto the skin once daily.       Review of patient's allergies indicates:  No Known Allergies    Past Medical History:   Diagnosis Date    Alcoholic hepatitis 3/27/2013    Anticoagulant long-term use     Atrial fibrillation 6/7/2016    CHF (congestive heart failure)     DVT (deep venous thrombosis)     Embolic stroke involving right middle cerebral artery 6/6/2016    Embolic stroke involving right middle cerebral artery 6/6/2016    Enlarged LA (left atrium) 6/7/2016    Hypertension     Persistent atrial fibrillation 8/9/2014    Pulmonary embolism      History reviewed. No pertinent surgical history.  Family History     Problem Relation (Age of Onset)    Arthritis Mother, Maternal Grandmother    Diabetes Maternal Grandmother, Maternal Grandfather    Kidney disease Mother        Social History Main Topics    Smoking status: Current Every Day Smoker     Packs/day: 0.50     Years: 26.00     Types: Cigarettes    Smokeless tobacco: Never Used    Alcohol use 1.8 oz/week     3 Shots of liquor per week      Comment: states quit 1 week ago    Drug use: Yes     Types: Marijuana    Sexual activity: Yes     Partners: Female     Review of Systems   Unable to perform ROS: Mental status change     Objective:     Vital Signs (Most Recent):  Temp: 96.7 °F (35.9 °C) (12/19/17 1500)  Pulse: (!) 149 (12/19/17 1829)  Resp: (!) 29 (12/19/17 1829)  BP: 111/67 (12/16/17 0315)  SpO2: (!) 90 % (12/19/17 1829) Vital Signs (24h Range):  Temp:  [96.7 °F (35.9 °C)-98.9 °F (37.2 °C)] 96.7 °F (35.9 °C)  Pulse:  [] 149  Resp:  [] 29  SpO2:  [31 %-98 %] 90 %  Arterial Line BP: (100-154)/(49-79) 100/49      Weight: 126 kg (277 lb 12.5 oz)  Body mass index is 35.66 kg/m².    Physical Exam   Constitutional: He appears well-developed. No distress.   HENT:   Head: Normocephalic and atraumatic.   Eyes: Scleral icterus (mild) is present.   Opens to voice / command but generally prefers to keep closed   Cardiovascular:   Irregularly irregular, rate 130-150s  Balloon pump in place   Pulmonary/Chest: Effort normal. No respiratory distress. He has wheezes. He has rales.   Abdominal: Soft. He exhibits distension (upper abdominal with +tympany). Tenderness: very mild epigastric to deep palpation. There is no rebound and no guarding.   Skin: He is not diaphoretic.       Significant Labs:  CBC:   Recent Labs  Lab 12/19/17  1729   WBC 9.51   RBC 4.62   HGB 13.6*   HCT 40.4   PLT 63*   MCV 87   MCH 29.4   MCHC 33.7     BMP:   Recent Labs  Lab 12/19/17  1351   GLU 90  90   *  134*   K 4.9  4.9     103   CO2 24  24   BUN 9  9   CREATININE 1.7*  1.7*   CALCIUM 8.7  8.7   MG 2.1  2.1     LFTs:   Recent Labs  Lab 12/19/17  1351   ALT 1,380*   AST 1,093*   ALKPHOS 235*   BILITOT 8.6*   PROT 4.9*   ALBUMIN 2.0*  2.0*     Coagulation:   Recent Labs  Lab 12/18/17  0301 12/19/17  0414   LABPROT 26.5* 20.6*   INR 2.6* 2.0*   APTT 35.5*  --      VBGs:   Recent Labs  Lab 12/19/17  1823   PH 7.363   PCO2 46.8*   PO2 39*   HCO3 26.6   POCSATURATED 71*   BE 1       Significant Diagnostics:  I have reviewed all pertinent imaging results/findings within the past 24 hours.    Assessment/Plan:     Acute gastric dilatation    52 y.o. male with multiple medical comorbidities currently with cardiogenic shock complicated by resolving ALI and GULSHAN on CRRT, now with acute gastric dilation.  -18F NG tube placed with return of large amount of air and 300cc thick gastric contents; marked improved abdominal distension  -Follow up KUB  -Correct electrolyte abnormalities; keep Mg>2, Phos>3, K>4, normocalcemia  -Strict NPO  -maintenance IV  fluids for third spacing; if large GI losses, please replace  -please call for any changes in abdominal exam / complaints of abdominal pain  -will follow          VTE Risk Mitigation         Ordered     heparin 25,000 units in dextrose 5% 250 mL (100 units/mL) infusion (heparin infusion)  Continuous     Route:  Intravenous        12/19/17 1012     Medium Risk of VTE  Once      12/14/17 1050     Place HENRIQUE hose  Until discontinued      12/14/17 1050     Place sequential compression device  Until discontinued      12/14/17 1050          Thank you for your consult. I will follow-up with patient. Please contact us if you have any additional questions.    Austin Rodriguez MD  General Surgery  [facility]

## 2017-12-20 NOTE — PROGRESS NOTES
UPDATE    Per NP, pt is now DNR and pt's wife may require support. SW met with pt's wife at bedside and provided counseling support. Pt's wife presents as aaox4 and coping appropriately for situation. Pt's wife states she and pt have large supportive family and daughter will be coming to hospital soon. Wife denies need for pastoral services at this time. No other needs voiced or indicated. SW providing psychosocial and counseling support, education, resources, and d/c planning as needed. SW continuing to follow and remains available.

## 2017-12-20 NOTE — ASSESSMENT & PLAN NOTE
- Appreciate EP's help. Back in A fib with RVR. Intolerant of Esmolol with hypotension  - INR 1.9. Heparin gtt restarted as HIT -negative and IABP 1:2

## 2017-12-20 NOTE — PROGRESS NOTES
[facility]  Heart Transplant  Progress Note    Patient Name: Altaf Guo  MRN: [id number]  Admission Date: 12/14/2017  Hospital Length of Stay: 6 days  Attending Physician: Tu Tong MD  Primary Care Provider: [facility]  Principal Problem:Cardiogenic shock    Subjective:     Interval History: Continues to have chest congestion with a productive cough, and is now requiring 50% VM. HGT has put out 2100 cc gastric contents since placed ~ 14 hours ago; no bowel sounds. Back in A fib with RVR so Esmolol gtt started earlier this morning, but his BP did not tolerate. Remains oliguric. CVP 8-9. US currently 200    Continuous Infusions:   epinephrine infusion Stopped (12/18/17 1521)    esmolol 50 mcg/kg/min (12/20/17 1100)    heparin (porcine) in 5 % dex 600 Units/hr (12/20/17 1100)    vasopressin (PITRESSIN) infusion Stopped (12/18/17 0000)     Scheduled Meds:   albuterol-ipratropium 2.5mg-0.5mg/3mL  3 mL Nebulization Q6H    diazePAM  5 mg Intravenous Q8H    sodium chloride 0.9%  3 mL Intravenous Q8H     PRN Meds:sodium chloride 0.9%, sodium chloride, acetaminophen, atropine, dextrose 50%, dextrose 50%, glucagon (human recombinant), glucose, glucose, hydrocodone-acetaminophen 5-325mg, insulin aspart, lorazepam, magnesium sulfate IVPB, morphine, ondansetron, ondansetron, oxyCODONE, potassium, sodium phosphates, potassium, sodium phosphates, potassium, sodium phosphates, sodium chloride 0.9%    Review of patient's allergies indicates:  No Known Allergies  Objective:     Vital Signs (Most Recent):  Temp: 99.4 °F (37.4 °C) (12/20/17 1100)  Pulse: (!) 149 (12/20/17 1100)  Resp: (!) 29 (12/20/17 1100)  BP: 111/67 (12/16/17 0315)  SpO2: 100 % (12/20/17 1100) Vital Signs (24h Range):  Temp:  [96.7 °F (35.9 °C)-99.4 °F (37.4 °C)] 99.4 °F (37.4 °C)  Pulse:  [128-170] 149  Resp:  [18-37] 29  SpO2:  [31 %-100 %] 100 %  Arterial Line BP: (100-154)/(46-79) 116/66     No data  found.    Body mass index is 35.66 kg/m².      Intake/Output Summary (Last 24 hours) at 12/20/17 1113  Last data filed at 12/20/17 1100   Gross per 24 hour   Intake          4014.34 ml   Output            06338 ml   Net         -7159.66 ml       Hemodynamic Parameters:  CVP:  [10 mmHg] 10 mmHg    Telemetry: A fib with RVR    Physical Exam   Constitutional: He appears well-developed and well-nourished.   HENT:   Head: Normocephalic and atraumatic.   Eyes: Conjunctivae and EOM are normal. Pupils are equal, round, and reactive to light.   Neck: Normal range of motion. Neck supple.   RIJ trialysis cath   Cardiovascular:   RRR except ectopi; + S3. Pedal pulses audible with doppler   Pulmonary/Chest: Effort normal.   Scattered coarse rhonchi   Abdominal: Soft. Bowel sounds are normal.   Musculoskeletal:   4+ edema mid back down; no cyanosis   Neurological:   Lethargic. Arouses to voice and answers some questions appropriately   Skin: Skin is warm and dry. Capillary refill takes 2 to 3 seconds.       Significant Labs:  CBC:    Recent Labs  Lab 12/19/17  1351 12/19/17  1729 12/20/17  0442   WBC 10.42 9.51 11.34   RBC 4.56* 4.62 4.49*   HGB 13.6* 13.6* 13.1*   HCT 38.8* 40.4 38.3*   PLT 78* 63* 68*   MCV 85 87 85   MCH 29.8 29.4 29.2   MCHC 35.1 33.7 34.2     BNP:    Recent Labs  Lab 12/14/17  0740 12/16/17  2257   * 671*     CMP:    Recent Labs  Lab 12/19/17  0414 12/19/17  1351 12/19/17  2243 12/20/17  0442   GLU 74  74 90  90 93 83  83   CALCIUM 8.6*  8.6* 8.7  8.7 8.7 9.0  9.0   ALBUMIN 2.0*  2.0* 2.0*  2.0* 1.9* 2.0*  2.0*   PROT 4.7* 4.9*  --  4.9*   *  134* 134*  134* 135* 136  136   K 4.2  4.2 4.9  4.9 4.7 4.5  4.5   CO2 26  26 24  24 29 29  29     101 103  103 102 102  102   BUN 7  7 9  9 9 8  8   CREATININE 1.5*  1.5* 1.7*  1.7* 1.5* 1.4  1.4   ALKPHOS 233* 235*  --  230*   ALT 1,544* 1,380*  --  1,099*   AST 1,458* 1,093*  --  689*   BILITOT 8.3* 8.6*  --  8.6*       Coagulation:     Recent Labs  Lab 12/16/17  2257  12/18/17  0301 12/19/17  0414 12/20/17  0442   INR 5.6*  < > 2.6* 2.0* 1.9*   APTT 36.1*  --  35.5*  --   --    < > = values in this interval not displayed.  LDH:  No results for input(s): LDH in the last 72 hours.  Microbiology:  Microbiology Results (last 7 days)     Procedure Component Value Units Date/Time    Culture, Respiratory with Gram Stain [[id number]] Collected:  12/19/17 1352    Order Status:  Completed Specimen:  Respiratory from Sputum, Expectorated Updated:  12/20/17 0021     Gram Stain (Respiratory) <10 epithelial cells per low power field.     Gram Stain (Respiratory) Rare WBC's     Gram Stain (Respiratory) Rare Gram positive cocci     Gram Stain (Respiratory) Rare Gram negative rods     Gram Stain (Respiratory) Rare Gram negative diplococci    Blood culture [[id number]] Collected:  12/17/17 1531    Order Status:  Completed Specimen:  Blood from Peripheral, Wrist, Right Updated:  12/19/17 1812     Blood Culture, Routine No Growth to date     Blood Culture, Routine No Growth to date     Blood Culture, Routine No Growth to date    Blood culture [[id number]] Collected:  12/17/17 1350    Order Status:  Completed Specimen:  Blood from Peripheral, Antecubital, Left Updated:  12/19/17 0956     Blood Culture, Routine Gram stain aer bottle: Gram positive rods      Blood Culture, Routine Gram stain pilo bottle: Gram positive rods      Blood Culture, Routine Results called to and read back by:Jama Palomino RN 12/18/2017  04:53     Blood Culture, Routine --     BACILLUS SPECIES  Organism is a probable contaminant      Culture, Respiratory with Gram Stain [[id number]] Collected:  12/17/17 1700    Order Status:  Completed Specimen:  Respiratory from Sputum Updated:  12/19/17 0857     Respiratory Culture Normal respiratory dwaine     Gram Stain (Respiratory) <10 epithelial cells per low power field.     Gram Stain (Respiratory) Moderate WBC's     Gram Stain (Respiratory)  Moderate Gram positive cocci     Gram Stain (Respiratory) Many Gram negative diplococci     Gram Stain (Respiratory) Moderate Gram negative rods    Urine culture [[id number]] Collected:  12/17/17 1318    Order Status:  Completed Specimen:  Urine from Urine, Catheterized Updated:  12/18/17 5520     Urine Culture, Routine No growth          I have reviewed all pertinent labs within the past 24 hours.    Estimated Creatinine Clearance: 89 mL/min (based on SCr of 1.4 mg/dL).          Assessment and Plan:     51 yo AAM with cardiogenic shock transferred for further care- has longstanding chronic combined HF, NICM,tobacco and etOH use, prior CTED (DVT/PE), stroke- transferred with IABP, DA and NE- shock associated with acute liver and renal failure, AF with RVR    * Cardiogenic shock    - Patient declining, now in A fib with RVR and intolerant of Esmolol with hypotension. Remains oliguric. Total bili climbing. Has ileus. Requiring supplemental O2. Discussion held with wife and daughter regarding grim prognosis, and decision may to make patient DNR/DNI  - Continue IABP at 1:1 for now. Coughing may make him bleed if removed.        Acute liver failure without hepatic coma    - Appreciate Hepatology's help. Most likely ischemic  - Total bili has climbed to 8.6. Transaminases are slowly trending down  - Hepatic US with dopplers complete, homogenous liver, hepatomegaly with trace ascites  - Hepatology planned to determine baseline liver injury from ETOH abuse as outpatient        Bacteremia    - Afebrile and no leukocytosis, but cultures obtained in the setting of high sVO2. Blood cultures with GPR - ID consulted. Per ID believe this is a contaminant and do not recommend abx.  - Patient remains afebrile with no leuks today.         ARF (acute renal failure)    - Nephrology following, appreciate assistance   - CVP 8-9 this AM  - CRRT with current UF of 200. Remains oliguirc          Ileus    - Appreciate Gen Surgery's help. NGT  with large output, no bowel sounds. Keeping NPO        Atrial fibrillation    - Appreciate EP's help. Back in A fib with RVR. Intolerant of Esmolol with hypotension  - INR 2. Heparin gtt restarted as HIT -negative (then discontinued yesterday due to hematemesis)        Acute pulmonary embolism    - H/O left PE 2014 - was on Xarelto but stopped taking. CTA chest 12/14/17 at [facility] showed possible small emboli within the basal segmental branches of the RLL (limited by motion artifact). No large central PE  - INR 1.9. Heparin gtt off 2/2 hematemesis yesterday        Thrombocytopenia    - Platelet count has dropped to 68K. HIT -ve        Chest congestion    - Scattered coarse rhonchi bilaterally, sputum fairly clear. Focal consolidation RLL by CXR. Sputum culture with normal respiratory dwaine  - [place] [id number]  Heart Transplant  [facility]

## 2017-12-20 NOTE — SUBJECTIVE & OBJECTIVE
Interval History:   Acutely decompensated overnight, noted to have increased abdominal distension.  KUB revealed gastric distension.  NGT placed with large amount of output (2100).  Developed A-fib with RVR overnight, HR around 160-180, now DNR/DNI with no escalation of care.  Family members at bedside.  I/O's not accurate overnight, net negative ~ 4L/24h.  Electrolytes stable.  Currently on VM.  Continues on SLED for metabolic clearance.  UF off.    Attempted Esmolol gtt today for HR control, but patient developed worsening hypotension, so gtt discontinued.    Review of patient's allergies indicates:  No Known Allergies  Current Facility-Administered Medications   Medication Frequency    0.9%  NaCl infusion (for blood administration) Q24H PRN    acetaminophen tablet 650 mg Q4H PRN    albuterol-ipratropium 2.5mg-0.5mg/3mL nebulizer solution 3 mL Q6H    atropine injection 0.5 mg PRN    dextrose 50% injection 12.5 g PRN    dextrose 50% injection 25 g PRN    diazePAM injection 5 mg Q8H    EPINEPHrine (ADRENALIN) 4 mg in sodium chloride 0.9% 250 mL infusion Continuous    esmolol 2000 mg in sodium chloride 0.9% 100 mL (20 mg/mL) Continuous    glucagon (human recombinant) injection 1 mg PRN    glucose chewable tablet 16 g PRN    glucose chewable tablet 24 g PRN    heparin 25,000 units in dextrose 5% 250 mL (100 units/mL) infusion (heparin infusion) Continuous    hydrocodone-acetaminophen 5-325mg per tablet 1 tablet Q4H PRN    insulin aspart pen 1-10 Units QID (AC + HS) PRN    lorazepam injection 1 mg Q4H PRN    morphine injection 2 mg Q10 Min PRN    ondansetron injection 4 mg Q8H PRN    ondansetron injection 4 mg Q12H PRN    oxyCODONE immediate release tablet 5 mg Q4H PRN    potassium, sodium phosphates 280-160-250 mg packet 2 packet PRN    potassium, sodium phosphates 280-160-250 mg packet 2 packet PRN    potassium, sodium phosphates 280-160-250 mg packet 2 packet PRN    sodium chloride 0.9% bolus  250 mL PRN    sodium chloride 0.9% flush 3 mL Q8H    vasopressin (PITRESSIN) 0.2 Units/mL in dextrose 5 % 100 mL infusion Continuous       Objective:     Vital Signs (Most Recent):  Temp: 99.4 °F (37.4 °C) (12/20/17 1100)  Pulse: (!) 161 (12/20/17 1300)  Resp: (!) 24 (12/20/17 1300)  BP: 111/67 (12/16/17 0315)  SpO2: 100 % (12/20/17 1300)  O2 Device (Oxygen Therapy): venti mask (12/20/17 1300) Vital Signs (24h Range):  Temp:  [96.7 °F (35.9 °C)-99.4 °F (37.4 °C)] 99.4 °F (37.4 °C)  Pulse:  [134-170] 161  Resp:  [18-37] 24  SpO2:  [31 %-100 %] 100 %  Arterial Line BP: ()/(46-79) 91/48     Weight: 126 kg (277 lb 12.5 oz) (12/16/17 0315)  Body mass index is 35.66 kg/m².  Body surface area is 2.56 meters squared.    I/O last 3 completed shifts:  In: 6435.4 [P.O.:120; I.V.:6315.4]  Out: 87148 [Urine:180; Drains:2100; Other:53154]    Physical Exam   Constitutional: He is oriented to person, place, and time. He appears well-developed and well-nourished. He appears lethargic. He appears distressed. Face mask in place.   HENT:   Head: Normocephalic and atraumatic.   Eyes: Conjunctivae and EOM are normal.   Neck: Neck supple. No tracheal deviation present. No thyromegaly present.   Cardiovascular: An irregularly irregular rhythm present. Tachycardia present.    Pulmonary/Chest: Tachypnea noted. He has no wheezes. He has rhonchi.   Abdominal: Soft. There is no tenderness. There is no guarding.   Musculoskeletal: He exhibits edema (2+). He exhibits no deformity.   Lymphadenopathy:     He has no cervical adenopathy.   Neurological: He is oriented to person, place, and time. He appears lethargic.   Skin: Skin is warm and dry. He is not diaphoretic.   Psychiatric: His behavior is normal. Judgment normal.       Significant Labs:  CBC:   Recent Labs  Lab 12/20/17  0442   WBC 11.34   RBC 4.49*   HGB 13.1*   HCT 38.3*   PLT 68*   MCV 85   MCH 29.2   MCHC 34.2     CMP:   Recent Labs  Lab 12/20/17  0442   GLU 83  83   CALCIUM  9.0  9.0   ALBUMIN 2.0*  2.0*   PROT 4.9*     136   K 4.5  4.5   CO2 29  29     102   BUN 8  8   CREATININE 1.4  1.4   ALKPHOS 230*   ALT 1,099*   *   BILITOT 8.6*

## 2017-12-20 NOTE — ASSESSMENT & PLAN NOTE
- Scattered coarse rhonchi bilaterally, sputum fairly clear. Focal consolidation RLL by CXR. Sputum culture with normal respiratory dwaine  - Aggressive pulmonary toilet

## 2017-12-21 VITALS
BODY MASS INDEX: 35.65 KG/M2 | OXYGEN SATURATION: 93 % | TEMPERATURE: 99 F | DIASTOLIC BLOOD PRESSURE: 67 MMHG | WEIGHT: 277.75 LBS | HEIGHT: 74 IN | SYSTOLIC BLOOD PRESSURE: 111 MMHG

## 2017-12-21 LAB
DEPRECATED SRA 0.1U/ML PORCINE HEPARIN S: 0 %
DEPRECATED SRA 100U/ML PORCINE HEPARIN S: 0 %
SRA PORCINE INTERP SER-IMP: NEGATIVE

## 2017-12-21 PROCEDURE — 27000202 HC IABP, ADD'L DAY

## 2017-12-21 NOTE — HOSPITAL COURSE
He was transferred here with multisystem organ failure, cardiogenic shock, oliguria on CRRT and shocked liver. Unfortunately he was not a candidate for advanced options. He remained on dialysis and and had a IABP but liver function did not improve. He also had atrial fibrillation bouts to the 170s-190s but this was unable to be rate controlled due to shock, renal failure, and liver failure. He was hemodynamically stable with these heart rates. On 12/20 discussion was had with family about poor prognosis and the decision was made to make him DNR. At 1050 patient went into asystole, was apneic, pulseless. On exam he had no heart beat, no lung sounds noted, no corneal reflexes. Fiona of death was called at 1058 pm.

## 2017-12-21 NOTE — DISCHARGE SUMMARY
[facility]  Heart Transplant  Discharge Summary      Patient Name: Altaf Guo  MRN: [id number]  Admission Date: 12/14/2017  Hospital Length of Stay: 6 days  Discharge Date and Time: 12/20/2017 11:10 PM  Attending Physician: Tu Tong MD   Discharging Provider: Samson Nails MD  Primary Care Provider: [facility] [[email or web address]]     HPI: 51 yo AAM with cardiogenic shock transferred for further care- has longstanding chronic combined HF, NICM,tobacco and etOH use, prior CTED (DVT/PE), stroke- transferred with IABP, DA and NE- shock associated with acute liver and renal failure, AF with RVR    Procedure(s) (LRB):  LORETTA (N/A)     Hospital Course: He was transferred here with multisystem organ failure, cardiogenic shock, oliguria on CRRT and shocked liver. Unfortunately he was not a candidate for advanced options. He remained on dialysis and and had a IABP but liver function did not improve. He also had atrial fibrillation bouts to the 170s-190s but this was unable to be rate controlled due to shock, renal failure, and liver failure. He was hemodynamically stable with these heart rates. On 12/20 discussion was had with family about poor prognosis and the decision was made to make him DNR. At 1050 patient went into asystole, was apneic, pulseless. On exam he had no heart beat, no lung sounds noted, no corneal reflexes. Fiona of death was called at 1058 pm.      Consults         Status Ordering Provider     Inpatient consult to Dietary  Once     Provider:  (Not yet assigned)    Completed DMITRY BETANCOURT     Inpatient consult to Electrophysiology  Once     Provider:  (Not yet assigned)    Completed JOSESITO AMBROCIO     Inpatient consult to General Surgery  Once     Provider:  (Not yet assigned)    Completed HOLGER SANTA     Inpatient consult to Hepatology  Once     Provider:  (Not yet assigned)    Completed ISAC CARRILLO     Inpatient consult to Infectious Diseases  Once      Provider:  (Not yet assigned)    Completed CAMRON CROCKETT     Inpatient consult to Nephrology  Once     Provider:  Adi Ley MD    Completed JOSE HARVEY     Inpatient consult to Nephrology  Once     Provider:  (Not yet assigned)    Completed ISAC CARRILLO     Inpatient consult to Urology  Once     Provider:  ELDER Peter MD    Completed OTF BLOCK          Significant Diagnostic Studies: Labs:   BMP:   Recent Labs  Lab 12/19/17  2243 12/20/17  0442 12/20/17  1225 12/20/17  1503   GLU 93 83  83 89  --    * 136  136 139  --    K 4.7 4.5  4.5 4.8  --     102  102 104  --    CO2 29 29  29 28  --    BUN 9 8  8 8  --    CREATININE 1.5* 1.4  1.4 1.3  --    CALCIUM 8.7 9.0  9.0 9.1  --    MG 1.9 2.2 2.1  2.1 1.9       Pending Diagnostic Studies:     Procedure Component Value Units Date/Time    Anti-Xa Heparin Monitoring [[id number]] Collected:  12/19/17 1728    Order Status:  Sent Lab Status:  In process Updated:  12/19/17 1729    Specimen:  Blood from Blood     Anti-Xa Heparin Monitoring [[id number]] Collected:  12/17/17 1047    Order Status:  Sent Lab Status:  In process Updated:  12/17/17 1047    Specimen:  Blood from Blood     Renal function panel [[id number]] Collected:  12/20/17 1310    Order Status:  Sent Lab Status:  In process Updated:  12/20/17 1310    Specimen:  Blood from Blood     Renal function panel [[id number]] Collected:  12/17/17 1359    Order Status:  Sent Lab Status:  In process Updated:  12/17/17 1400    Specimen:  Blood from Blood     Serotonin Release Assay [[id number]] Collected:  12/18/17 0641    Order Status:  Sent Lab Status:  In process Updated:  12/18/17 0649    Specimen:  Blood from Blood     Transesophageal echo [[id number]]     Order Status:  Sent Lab Status:  No result         Final Active Diagnoses:    Diagnosis Date Noted POA    PRINCIPAL PROBLEM:  Cardiogenic shock [R57.0] 12/15/2017 Yes    Ileus [K56.7] 12/20/2017 Unknown     Acute gastric dilatation [K31.0] 2017 Yes    Bacteremia [R78.81] 2017 Yes    Chest congestion [R09.89] 2017 Yes    Thrombocytopenia [D69.6] 2017 Yes    Acute renal failure with acute cortical necrosis [N17.1] 2017 Yes    Paroxysmal atrial fibrillation [I48.0] 2017 Yes    Hyponatremia [E87.1] 2017 Yes    Acute liver failure without hepatic coma [K72.00] 2017 Yes    Ischemic hepatitis (Shock Liver) [K75.9] 2017 Unknown    Renal failure syndrome [N19] 12/15/2017 Yes    Acute pulmonary embolism [I26.99] 2017 Yes    Elevated LFTs [R79.89] 2017 Yes    Atrial fibrillation [I48.91] 2016 Yes    Elevated troponin [R74.8] 2016 Yes     Chronic    Essential hypertension [I10] 2016 Yes     Chronic    Type 2 diabetes mellitus, controlled [E11.9] 2016 Yes     Chronic    Chronic combined systolic and diastolic heart failure [I50.42] 2016 Yes     Chronic    History of medication noncompliance [Z91.14] 2016 Not Applicable     Chronic    Alcoholism [F10.20] 2013 Yes      Problems Resolved During this Admission:    Diagnosis Date Noted Date Resolved POA      Discharged Condition:     Disposition:     Follow Up:    Patient Instructions:   No discharge procedures on file.  Medications:  None (patient  at medical facility)    Samson Nails MD  Heart Transplant  [facility]

## 2017-12-21 NOTE — NURSING
Pt bradying down, BP decreasing per A line and IABP. Pt agonally breathing. RN to bedside to explain to family what is occurring. Asked to call other family members.  called and notified to assist in comforting family. Charge Nurse and HTS fellow notified of pt's declining condition.     Pt found to be in asystole at 2258, see Dr. Nails's note for details. Dr. Nails at bedside to pronounce. Pt surrounded by family with , RN, and Charge RN at bedside for additional support and comfort. All questions answered. Encouraged to spend time together in pt's room.

## 2017-12-22 LAB
BACTERIA BLD CULT: NORMAL
BACTERIA SPEC AEROBE CULT: NORMAL
GRAM STN SPEC: NORMAL

## 2017-12-22 NOTE — PHYSICIAN QUERY
PT Name: Altaf Guo  MR #: [id number]    Physician Query Form - Nutrition Clarification     CDS/: Ranjana Bonilla RN, CDI              Contact information: [phone]    This form is a permanent document in the medical record.     Query Date: December 22, 2017    By submitting this query, we are merely seeking further clarification of documentation.. Please utilize your independent clinical judgment when addressing the question(s) below.    The Medical record contains the following:   Indicators  Supporting Clinical Findings Location in Medical Record    % of Estimated Energy Intake over a time frame from p.o., TF, or TPN      Weight Status over a time frame      Subcutaneous Fat and/or Muscle Loss      Fluid Accumulation or Edema      Reduced  Strength     x Wt / BMI / Usual Body Weight Weight: 115.7 kg (255 lb 1.2 oz)   Body mass index is 32.75 kg/m².       Weight: 126 kg (277 lb 12.5 oz)   Body mass index is 35.66 kg/m².    H&P 12/14            Progress Note 12/16     Cardiology    Delayed Wound Healing / Failure to Thrive     x Acute or Chronic Illness cardiogenic shock, ileus, Acute renal failure, atrial fibrillation, Alcoholism   Discharge Summary     12/20    Medication      Treatment     x Other Acute protein malnutrition    Consult 12/15     Nephrology       AND / ASPEN Clinical Characteristics (October 2011)  A minimum of two characteristics is recommended for diagnosing either moderate or severe malnutrition   Mild Malnutrition Moderate Malnutrition Severe Malnutrition   Energy Intake from p.o., TF or TPN. < 75% intake of estimated energy needs for less than 7 days < 75% intake of estimated energy needs for greater than 7 days < 50% intake of estimated energy needs for > 5 days   Weight Loss 1-2% in 1 month  5% in 3 months  7.5% in 6 months  10% in 1 year 1-2 % in 1 week  5% in 1 month  7.5% in 3 months  10% in 6 months  20% in 1 year > 2% in 1 week  > 5% in 1 month  > 7.5% in 3 months  > 10%  in 6 months  > 20% in 1 year   Physical Findings     None *Mild subcutaneous fat and/or muscle loss  *Mild fluid accumulation  *Stage II decubitus  *Surgical wound or non-healing wound *Mod/severe subcutaneous fat and/or muscle loss  *Mod/severe fluid accumulation  *Stage III or IV decubitus  *Non-healing surgical wound     Provider, please specify diagnosis or diagnoses associated with above clinical findings.    [x ] Mild Protein-Calorie Malnutrition  [ ] Malnutrition ruled out  [ ] Other: ________________________________  [ ] Clinically Undetermined    Please document in your progress notes daily for the duration of treatment until resolved and include in your discharge summary.

## 2017-12-22 NOTE — PHYSICIAN QUERY
PT Name: Altaf Guo  MR #: [id number]     Physician Query Form - Documentation Clarification      CDS/: Ranjana Bonilla RN, CDI              Contact information:[phone]    This form is a permanent document in the medical record.     Query Date: December 22, 2017    By submitting this query, we are merely seeking further clarification of documentation. Please utilize your independent clinical judgment when addressing the question(s) below.    The Medical record reflects the following:    Supporting Clinical Findings Location in Medical Record     Acute pulmonary embolism       H&P  Discharge Summary   Cardiogenic shock        Also noted to have new PE and started on therapeutic             Lovenox.   Patient with increasing hypoxia and     hypotension    Cardiogenic shock requiring 2 pressors        Acute pulmonary embolism Currently on enox 1mg/kg qd dosing given rise in creat.   Size of PE seems small to be causing his hypotension, more likely d/t acute CHF.       7 - The estimated PA systolic pressure is 37 mmHg.    H&P12/17    Progress Note 12/15     Hospital Medicine      Progress Note 12/16     Hospital Medicine       Progress Note 12/16     Cardiology          2D Echo 12/14                                                                            Doctor, Please specify diagnosis or diagnoses associated with above clinical findings.    Provider Use Only        [  ]  Acute pulmonary embolism without Cor Pulmonale    [  ]  Acute pulmonary embolism with Cor Pulmonale    [  ]  Other_________________________.                                                                                                               [ x ] Clinically undetermined

## 2017-12-22 NOTE — PHYSICIAN QUERY
PT Name: Altaf Guo  MR #: [id number]    Physician Query Form - Atrial Flutter Specificity Clarification     CDS/: Ranjana Bonilla RN, CDI              Contact information: [phone]  This form is a permanent document in the medical record.     Query Date: December 22, 2017    By submitting this query, we are merely seeking further clarification of documentation. Please utilize your independent clinical judgment when addressing the question(s) below.    The medical record contains the following:   Indicators     Supporting Clinical Findings Location in Medical Record   x Atrial Flutter Hx PAF. Currently A-flutter RVR. On IV amiodarone and IV diltiazem. Repeat echo.  Consult 12/14      Cardiology     x EKG results EKG A-flutter 124 NSSTT changes    H&P 12/17   x Medication On IV amiodarone and IV diltiazem. Consult 12/14      Cardiology    Treatment      Other       Provider, please further specify the Atrial Flutter diagnosis.    [x  ] Atypical  [  ] Type I  [  ] Type II  [  ] Typical  [  ] Other (please specify): ___________________________________  [  ] Clinically Undetermined    Please document in your progress notes daily for the duration of treatment until resolved, and include in your discharge summary.

## 2019-04-09 NOTE — ASSESSMENT & PLAN NOTE
- Appreciate Hepatology's help. Most likely ischemic  - Total bili has climbed to 8.3. Transaminases are trending down  - Hepatic US with dopplers complete, homogenous liver, hepatomegaly with trace ascites  - Hepatology plans to determine baseline liver injury from ETOH abuse as outpatient   Island Pedicle Flap Text: The defect edges were debeveled with a #15c scalpel blade.  Given the location of the defect, shape of the defect and the proximity to free margins an island pedicle advancement flap was deemed most appropriate.  Using a sterile surgical marker, an appropriate advancement flap was drawn incorporating the defect, outlining the appropriate donor tissue and placing the expected incisions within the relaxed skin tension lines where possible.    The area thus outlined was incised deep to adipose tissue with a #15 scalpel blade.  The skin margins were undermined to an appropriate distance in all directions around the primary defect and laterally outward around the island pedicle utilizing iris scissors.  There was minimal undermining beneath the pedicle flap.

## 2021-07-19 NOTE — ASSESSMENT & PLAN NOTE
Patient: Veronica Robles Date: 2021  : 1936 Attending: Joshua Hall MD  84 year old female         Subjective   2021  Generalized pain    2021 patient is undergoing cardiac catheterization    2021 denies any chest pains no shortness of breath vitals are stable    7/15/2021 patient feels tired discussed with the patient's daughters    2021 patient had PCI with balloon angioplasty no stent was placed    2021 continue to feel weak she had nausea and vomited once and some abdominal pain denied any chest pains no shortness of breath family including her daughter and granddaughter (medical field)  Patient mentioned that she feels the same when she came in and now it starts getting worse and start improving blood pressure remains elevated no arrhythmia noted, the patient feels cold but this is her usual thing that she is complaining of back pain    2021 patient feels better tolerating diet no further nausea no vomiting    2021 patient was transferred to the medical floor [address]  No further episodes of nausea no vomiting denies any chest pains or shortness of breath she has some urinary retention      ROS     Constitutional: Negative for fever and chills.   Skin: Negative for rash.   HEENT: Negative for eye drainage, ear pain, sore throat.  Respiratory: Negative cough or shortness of breath.    Cardiovascular: Negative for chest pain or chest pressure.   Gastrointestinal: Patient is complaining of nausea and she vomited once no bloody issue but biliary   genitourinary: Negative for dysuria, frequency or hematuria.  Extremities:  Negative for joint swelling or joint pain.  Endocrine: Negative for heat or cold intolerance.  Psychiatric: Negative for change in mood or mentation.  Complaining of back pain    ALLERGIES:  Patient has no known allergies.      Medications And Infusions     Scheduled:   • hydrochlorothiazide  25 mg Oral Daily   • losartan  12.5 mg Oral  Probably secondary to rapid Afib and significant CHF.  No chest pain.  Cards consulted.     Daily   • aspirin  81 mg Oral Daily   • metoPROLOL tartrate  50 mg Oral 2 times per day   • potassium phosphate/sodium phosphate  1 tablet Oral BID   • clopidogrel  75 mg Oral Daily   • sodium chloride (PF)  2 mL Intracatheter 2 times per day   • famotidine  20 mg Oral Daily        Infusions:  • bivalirudin (ANGIOMAX) in sodium chloride 0.9% bolus     • bivalirudin (ANGIOMAX) 250 mg in sodium chloride 0.9 % 50 mL cath lab infusion protocol Stopped (07/16/21 1530)   • sodium chloride 0.9% infusion Stopped (07/16/21 1608)   • sodium chloride 0.9% infusion     • sodium chloride 0.9% infusion           Prior To Admission Medications  Medications Prior to Admission   Medication Sig Dispense Refill   • atorvastatin (LIPITOR) 10 MG tablet Take 10 mg by mouth at bedtime.     • hydrochlorothiazide (HYDRODIURIL) 25 MG tablet Take 25 mg by mouth daily.     • HYDROcodone-acetaminophen (NORCO) 5-325 MG per tablet Take 1 tablet by mouth every 6 hours as needed.     • ramipril (ALTACE) 5 MG capsule Take 5 mg by mouth daily.            Rhythm: Normal Sinus Rhythm      Physical Exam       Vitals Last Value 24 Hour Range  Temperature 97.7 °F (36.5 °C) Temp  Min: 97.2 °F (36.2 °C)  Max: 98.4 °F (36.9 °C)  Pulse 75 Pulse  Min: 72  Max: 91  Respiratory 16 Resp  Min: 15  Max: 20  Blood Pressure (!) 158/81 BP  Min: 110/54  Max: 172/79  Arterial BP   No data recorded  Pulse Oximetry 97 % SpO2  Min: 94 %  Max: 97 %      Vitals Today Admission  Weight 60.9 kg (134 lb 4.2 oz) Weight: 62.4 kg (137 lb 9.1 oz)    Body mass index is 25.37 kg/m².    Weight over the past 48 Hours:  Patient Vitals for the past 48 hrs:   Weight   07/18/21 0427 61.5 kg (135 lb 9.3 oz)   07/19/21 0715 60.9 kg (134 lb 4.2 oz)        Intake/Output:    I/O's    Intake/Output Summary (Last 24 hours) at 7/19/2021 1847  Last data filed at 7/19/2021 1817  Gross per 24 hour   Intake 2434.61 ml   Output 1400 ml   Net 1034.61 ml       Physical Exam  Constitutional:        General: She is not in acute distress.     Appearance: She is well-developed. She is not diaphoretic.   HENT:      Head: Normocephalic and atraumatic.   Eyes:      Conjunctiva/sclera: Conjunctivae normal.      Pupils: Pupils are equal, round, and reactive to light.   Cardiovascular:      Rate and Rhythm: Normal rate and regular rhythm.      Heart sounds: Normal heart sounds, S1 normal and S2 normal. No murmur heard.     Pulmonary:      Effort: Pulmonary effort is normal.      Breath sounds: Normal breath sounds. No wheezing.   Abdominal:      General: Bowel sounds are normal.      Palpations: Abdomen is soft.      Tenderness: There is abdominal tenderness.      Comments: Mild generalized abdominal discomfort and minimal tenderness no rebound   Genitourinary:     Comments: Deferred Exam  Musculoskeletal:         General: No deformity. Normal range of motion.      Cervical back: Normal range of motion.   Skin:     General: Skin is warm and dry.      Findings: No erythema.   Neurological:      Mental Status: She is alert and oriented to person, place, and time.           Laboratory Data       Recent Labs   Lab 07/19/21  0459 07/18/21  0522 07/17/21  0611 07/16/21  0531 07/15/21  0647 07/12/21  2159   WBC 6.0 5.5 7.0 4.0* 2.6*  --    HCT  --   --  35.4* 31.8* 33.0*  --    HGB 10.3* 10.5* 12.1 10.8* 11.5*  --     198 212 129* 144  --    PTT  --  25 27 25 23  --    SODIUM 135 137 134* 137  --   --    POTASSIUM 3.7 3.5 3.9 3.6  --   --    CHLORIDE 102 103 102 105  --   --    CO2 24 26 21 23  --   --    GLUCOSE 83 98 117* 91  --   --    BUN 10 11 6 6  --   --    CREATININE 1.01* 1.07* 0.82 0.90  --   --    RAPDTR  --   --   --   --   --  8.57*   CHOLESTEROL  --   --  171  --   --   --    HDL  --   --  68  --   --   --    CALCLDL  --   --  82  --   --   --    TRIGLYCERIDE  --   --  105  --   --   --      Troponin:  Lab Results   Component Value Date    RAPDTR 8.57 () 07/12/2021    RUTH 13.10 () 07/12/2021     RAPDTR 7.03 () 07/11/2021       Recent Results (from the past 24 hour(s))   Hemoglobin    Collection Time: 07/19/21  4:59 AM   Result Value Ref Range    HGB 10.3 (L) 12.0 - 15.5 g/dL   Magnesium    Collection Time: 07/19/21  4:59 AM   Result Value Ref Range    Magnesium 1.4 (L) 1.7 - 2.4 mg/dL   Phosphorus    Collection Time: 07/19/21  4:59 AM   Result Value Ref Range    Phosphorus 2.5 2.4 - 4.7 mg/dL   Platelet Count    Collection Time: 07/19/21  4:59 AM   Result Value Ref Range     140 - 450 K/mcL   WBC    Collection Time: 07/19/21  4:59 AM   Result Value Ref Range    WBC 6.0 4.2 - 11.0 K/mcL    NRBC 0 <=0 /100 WBC   Basic Metabolic Panel    Collection Time: 07/19/21  4:59 AM   Result Value Ref Range    Fasting Status      Sodium 135 135 - 145 mmol/L    Potassium 3.7 3.4 - 5.1 mmol/L    Chloride 102 98 - 107 mmol/L    Carbon Dioxide 24 21 - 32 mmol/L    Anion Gap 13 10 - 20 mmol/L    Glucose 83 65 - 99 mg/dL    BUN 10 6 - 20 mg/dL    Creatinine 1.01 (H) 0.51 - 0.95 mg/dL    Glomerular Filtration Rate 52 (L) >90 mL/min/1.73m2    BUN/ Creatinine Ratio 10 7 - 25    Calcium 7.8 (L) 8.4 - 10.2 mg/dL   Gold Top    Collection Time: 07/19/21  5:10 AM   Result Value Ref Range    Extra Tube Hold for Add Ons                  Diagnostic Imaging     LAST ECHO/ECHO STRESS:  No valid procedures specified.  LAST MRI:  No results found for this or any previous visit.  LAST CT:  === 07/11/21 ===    CT ABDOMEN PELVIS WO CONTRAST    - Narrative -  EXAM: CT ABDOMEN PELVIS WO CONTRAST    CLINICAL INDICATION: Abdominal pain and diarrhea for the past 3 days.    COMPARISON: None available.    TECHNIQUE: Multiple contiguous 5 mm spiral axial images were obtained from  above the level of the domes of the diaphragm through the pubic symphysis  without the administration of intravenous contrast although oral contrast  was given with 2-D coronal and sagittal reformatted images provided.  Dose  reduction software was  utilized.    FINDINGS: The visualized lung bases shows areas of parenchymal scarring.  Coronary artery calcifications are noted.    Within the abdomen, the liver, spleen, pancreas, and adrenal glands  bilaterally are unremarkable although their evaluation is limited due to  lack of IV contrast administration.  The gallbladder surgically absent with  a punctate focus of pneumobilia is noted.  There is no evidence of  hydronephrosis or obstructing radiopaque genitourinary calculi.  The  appendix is not seen although no inflammatory changes are seen in the right  lower quadrant.  Uncomplicated diverticulosis is seen within the region of  the sigmoid colon.    The majority of the contrast is seen staining within a distended stomach  with thickening of the walls of the gastric antrum which could be  inflammatory/infectious although direct visualization is recommended with a  component of gastric outlet obstruction noted.    Within the pelvis, no pelvic masses are identified. No pelvic or inguinal  adenopathy is seen.    On the bone windows degenerative changes are seen throughout the spine..    - Impression -  1.    Distention of the stomach with oral contrast with thickening of the  walls of the gastric antrum causing a component of gastric outlet  obstruction which could be inflammatory/infectious although direct  visualization is suggested.  2.   Uncomplicated diverticulosis.    Electronically Signed by: FIDEL ENRIQUEZ M.D.  Signed on: 7/11/2021 1:25 PM  LAST EKG:    Encounter Date: 07/11/21   Electrocardiogram 12-Lead   Result Value    Ventricular Rate EKG/Min (BPM) 99    Atrial Rate (BPM) 99    AR-Interval (MSEC) 162    QRS-Interval (MSEC) 82    QT-Interval (MSEC) 374    QTc 480    P Axis (Degrees) 73    R Axis (Degrees) 10    T Axis (Degrees) 53    REPORT TEXT      Normal sinus rhythm  Nonspecific T wave abnormality  Prolonged QT  Abnormal ECG  When compared with ECG of  13-JUL-2021 14:59,  Inverted T waves have  replaced nonspecific T wave abnormality in  Anterior leads  Confirmed by FLAKO HIGGINBOTHAM DO ([id number]) on 7/18/2021 8:16:43 AM       LAST X-RAY:  === 07/11/21 ===    XR CHEST PA OR AP 1 VIEW    - Narrative -  EXAM: XR CHEST PA OR AP 1 VIEW    CLINICAL INDICATION: Chest pain.  Vomiting.  Diarrhea.  Weakness for four  days.    COMPARISON: 08/01/2018    FINDINGS:  Frontal view of the chest was obtained.    The cardiomediastinal silhouette is normal in size. Pulmonary vascularity  is within normal limits.    No evidence of focal consolidation, pleural effusion, or pneumothorax.    - Impression -  No evidence of acute cardiopulmonary process.    Electronically Signed by: JODY CARDOSO M.D.  Signed on: 7/11/2021 12:38 PM  LAST U/S:  === 07/11/21 ===    US LIVER GALLBLADDER PANCREAS    - Narrative -  EXAM: US LIVER GALLBLADDER PANCREAS    CLINICAL INDICATION: Liver failure.    COMPARISON: CT scan of the abdomen and pelvis without contrast 07/11/2021.    FINDINGS: The pancreas is obscured by overlying bowel gas and suboptimally  evaluated.  Please see the CT scan performed earlier for complete  evaluation.  The visualized portions of the liver show homogeneous  echogenicity without gross focal masses.  The gallbladder surgically  absent.  There is no evidence of intrahepatic biliary ductal dilatation  with the common hepatic duct measuring 3 mm in size.    - Impression -  1.  Surgical absence of the gallbladder.    Electronically Signed by: FIDEL ENRIQUEZ M.D.  Signed on: 7/11/2021 1:46 PM      XR CHEST PA AND LATERAL 2 VIEWS   Final Result      Interval development of mild pulmonary vascular congestion with new small   pleural effusions and adjacent bibasilar airspace disease.  Recommend   clinical correlation for signs/symptoms of congestive heart failure/fluid   overload.         Electronically Signed by: YESSENIA OSPINA M.D.    Signed on: 7/19/2021 2:01 PM          Cath/PV Case   Final Result      Cath/PV Case   Final Result       US LIVER GALLBLADDER PANCREAS   Final Result   1.  Surgical absence of the gallbladder.      Electronically Signed by: FIDEL ENRIQUEZ M.D.    Signed on: 7/11/2021 1:46 PM          CT ABDOMEN PELVIS WO CONTRAST   Final Result   1.    Distention of the stomach with oral contrast with thickening of the   walls of the gastric antrum causing a component of gastric outlet   obstruction which could be inflammatory/infectious although direct   visualization is suggested.    2.   Uncomplicated diverticulosis.      Electronically Signed by: FIDEL ENRIQUEZ M.D.    Signed on: 7/11/2021 1:25 PM          XR CHEST PA OR AP 1 VIEW   Final Result   No evidence of acute cardiopulmonary process.      Electronically Signed by: JODY CARDOSO M.D.    Signed on: 7/11/2021 12:38 PM                CARDIAC STUDIES:   EKG:   Encounter Date: 07/11/21   Electrocardiogram 12-Lead   Result Value    Ventricular Rate EKG/Min (BPM) 99    Atrial Rate (BPM) 99    VA-Interval (MSEC) 162    QRS-Interval (MSEC) 82    QT-Interval (MSEC) 374    QTc 480    P Axis (Degrees) 73    R Axis (Degrees) 10    T Axis (Degrees) 53    REPORT TEXT      Normal sinus rhythm  Nonspecific T wave abnormality  Prolonged QT  Abnormal ECG  When compared with ECG of  13-JUL-2021 14:59,  Inverted T waves have replaced nonspecific T wave abnormality in  Anterior leads  Confirmed by FLAKO HIGGINBOTHAM DO ([id number]) on 7/18/2021 8:16:43 AM     7/13/2021  Procedure performed  1 selective left and right coronary coronary angiogram  2 left ventriculogram  3 right femoral angiogram admix placement  4.  For conscious sedation administered by registered nurse approximate time is 25 minutes     Indication  Non-STEMI     Finding  1.  Left main moderate size caliber vessel with calcification  2.  LAD calcification throughout the proximal to mid segment and there is an area of 50% eccentric stenosis at the mid vessel after a septal .  The LAD is very tortuous and at the mid segment after an  area of 90% tortuosity there is a thrombotic stenosis of 90%.  The vessel itself reaches the apex and wraps around it.  It gives a large diagonal vessel between the first and the second stenosis in the LAD.  3.  The left circumflex mild calcification nondominant  4.  The right coronary artery is large and tortuous and has mild calcification and mild disease throughout and gives a PDA and PLB branches  5.  Ejection fraction is 55% with mild apical wall hypokinesia  6.  Normal filling pressures     Plan  Patient will need to have a PCI of the LAD however due to presence of acute renal failure acute liver failure, and anemia and possibility patient pain in the leuko other procedures  PCI was not done today and will bel defered that till she is cleared from services including clearance from infectious disease as well, flow within the LAD is CRISTOBAL-3.  Continue heparin  And if there is no contraindication for antiplatelet therapy.  Discussed with the patient and family  Likely PCI on Friday morning.  If general condition allows and the patient labs better  ECHO:   Date:   7/12/2021    STUDY CONCLUSIONS  SUMMARY:   Left ventricle: The cavity size is normal. Wall thickness is  normal. The ejection fraction was measured by biplane method of disks. The  ejection fraction is 60%.         7/16/2021  Procedure details     1.  Selective left coronary angiogram  2.  Attempted angioplasty of the mid LAD, due to presence of significant tortuosity and calcification larger balloon could not be advanced, there was no reflow after advancing wire due to significant tortuosity, patient had angioplasty with suboptimal result.  3.  Right femoral angiogram and Angio-Seal placement     Finding  Left main is angiographically normal  LAD calcified at the proximal/mid with 50-60% centric stenosis followed by multiple area of significant tortuosity and 90 degrees, gives 2 diagonals.  There is an area after the second diagonal which is subtotally  occluded with evidence of calcification followed by tortuosity.  CRISTOBAL-3 flow     Procedure  Right femoral access with 6 Yemeni sheath.  EBU 3.75 Angiomax was used as an anticoagulation multiple angiographic views shows area of subtotal occlusion of the mid LAD with significant tortuosity and calcification noted.  I did use [place] FC wire and did not cross due to presence of multiple tortuosity is high used 8 x 10mm balloon to help in advancing the wire and the wire was advanced across the lesion but the flow was occluding completely likely due to wire fires or the presence of dissection which could not be seen.  Intracoronary nitroglycerin nicardipine was given but the flow was not there the patient did not have any chest pain there was no arrhythmia.  Then I did balloon angioplasty with with the help of his balloon but did not cross all the way so I used a guide liner and used the balloon again and I used a larger balloon 2.0 x 12 mm which crossed the lesion but did not take the whole significant turns throughout and I inflated the pulmonary twice but the flow was not enough down to the LAD.  Even with the help of a guide liner which to give me a good guide support.  I remove the guide liner.  The wire was also obstructing the flow and I removed the wire and the flow was established again and I gave the patient intracoronary nitroglycerin nicardipine there was some improvement at the area for stenosis but the flow was not as brisk as CRISTOBAL-3 flow and it was between 2 and 3 but the patient did not have chest pain no shortness of breath and no EKG changes.  Blood pressure remains elevated at 140/80.  At this point I decided to stop and abort the procedure.  The patient was complaining of coldness and she did not want to finish the procedure and she was requesting this to be stopped as well and she cannot have any pain,.  She had the same abdominal pain and groin pain prior to starting the procedure.  Right femoral  ultrasound shows appreciated as above femoral artery bifurcation Angio-Seal was deployed.  I discussed that with the patient's family 3 daughters and explained to them that these are suboptimal results, with only balloon angioplasty without stenting..     Impression attempted angioplasty with the balloon but no stent was deployed  Suboptimal results.  Continue medical therapy with aspirin Plavix and continue beta-blocker  Continue cardiac monitor status     No problem-specific Assessment & Plan notes found for this encounter.      Smoking Cessation  Counseling given: Not Answered       Assessment/Plan:    Assessment and Plan:  1. NSTEMI (non-ST elevated myocardial infarction) (CMS/HCC)    2. Transaminitis    3. Acute kidney injury (CMS/HCC)    4. Hyponatremia       84-year-old presented with non-STEMI nausea vomiting abdominal pain and diarrhea    Underwent angiogram which shows calcified coronaries and subtotal LAD occlusion Angioplasty was done with balloon but stent was not placed, due to presence of significant calcification and tortuosity and could not opened the vessel with larger balloon.  Patient will continue to be treated medically on aspirin and Plavix  Tolerating aspirin and Plavix well hemoglobin is stable  Patient feels better there is no evidence of chest pains or arrhythmia noted  No further nausea no abdominal pain  Transferred to the medical floor    2.  Elevated blood pressure tolerating metoprolol 50 mg twice a day  Increase losartan 25 mg daily  Monitor potassium  And renal function      3.Physical therapy  Probably needs to have admission to inpatient rehab      4.Liver enzymes are coming down  Please keep the patient another day or 2 for monitoring and she needs to have     5.  Urinary retention management by primary team     Echocardiogram shows normal left ventricular function          Delfino Leyva MD
